# Patient Record
Sex: MALE | Race: WHITE | NOT HISPANIC OR LATINO | Employment: OTHER | URBAN - METROPOLITAN AREA
[De-identification: names, ages, dates, MRNs, and addresses within clinical notes are randomized per-mention and may not be internally consistent; named-entity substitution may affect disease eponyms.]

---

## 2017-07-19 ENCOUNTER — ALLSCRIPTS OFFICE VISIT (OUTPATIENT)
Dept: OTHER | Facility: OTHER | Age: 67
End: 2017-07-19

## 2017-07-19 LAB
BILIRUB UR QL STRIP: NORMAL
CLARITY UR: NORMAL
COLOR UR: YELLOW
GLUCOSE (HISTORICAL): 100
HGB UR QL STRIP.AUTO: NEGATIVE
KETONES UR STRIP-MCNC: NEGATIVE MG/DL
LEUKOCYTE ESTERASE UR QL STRIP: NORMAL
NITRITE UR QL STRIP: NEGATIVE
PH UR STRIP.AUTO: 6 [PH]
PROT UR STRIP-MCNC: 30 MG/DL
SP GR UR STRIP.AUTO: 1.02
UROBILINOGEN UR QL STRIP.AUTO: 0.2

## 2018-01-13 VITALS
DIASTOLIC BLOOD PRESSURE: 84 MMHG | BODY MASS INDEX: 24.91 KG/M2 | WEIGHT: 174 LBS | HEIGHT: 70 IN | SYSTOLIC BLOOD PRESSURE: 132 MMHG

## 2018-07-19 DIAGNOSIS — N40.1 ENLARGED PROSTATE WITH LOWER URINARY TRACT SYMPTOMS (LUTS): ICD-10-CM

## 2018-09-25 ENCOUNTER — OFFICE VISIT (OUTPATIENT)
Dept: FAMILY MEDICINE CLINIC | Facility: CLINIC | Age: 68
End: 2018-09-25
Payer: COMMERCIAL

## 2018-09-25 VITALS
DIASTOLIC BLOOD PRESSURE: 70 MMHG | TEMPERATURE: 97.6 F | OXYGEN SATURATION: 98 % | WEIGHT: 181 LBS | RESPIRATION RATE: 16 BRPM | SYSTOLIC BLOOD PRESSURE: 124 MMHG | HEIGHT: 70 IN | HEART RATE: 70 BPM | BODY MASS INDEX: 25.91 KG/M2

## 2018-09-25 DIAGNOSIS — Z00.00 WELL ADULT EXAM: Primary | ICD-10-CM

## 2018-09-25 DIAGNOSIS — R53.83 FATIGUE, UNSPECIFIED TYPE: ICD-10-CM

## 2018-09-25 DIAGNOSIS — R23.8 EASY BRUISING: ICD-10-CM

## 2018-09-25 LAB
ALBUMIN SERPL BCP-MCNC: 4.2 G/DL (ref 3.5–5)
ALP SERPL-CCNC: 56 U/L (ref 46–116)
ALT SERPL W P-5'-P-CCNC: 24 U/L (ref 12–78)
ANION GAP SERPL CALCULATED.3IONS-SCNC: 5 MMOL/L (ref 4–13)
AST SERPL W P-5'-P-CCNC: 18 U/L (ref 5–45)
BASOPHILS # BLD AUTO: 0.02 THOUSANDS/ΜL (ref 0–0.1)
BASOPHILS NFR BLD AUTO: 0 % (ref 0–1)
BILIRUB SERPL-MCNC: 0.69 MG/DL (ref 0.2–1)
BUN SERPL-MCNC: 15 MG/DL (ref 5–25)
CALCIUM SERPL-MCNC: 9.3 MG/DL (ref 8.3–10.1)
CHLORIDE SERPL-SCNC: 108 MMOL/L (ref 100–108)
CHOLEST SERPL-MCNC: 218 MG/DL (ref 50–200)
CO2 SERPL-SCNC: 29 MMOL/L (ref 21–32)
CREAT SERPL-MCNC: 0.81 MG/DL (ref 0.6–1.3)
EOSINOPHIL # BLD AUTO: 0.05 THOUSAND/ΜL (ref 0–0.61)
EOSINOPHIL NFR BLD AUTO: 1 % (ref 0–6)
ERYTHROCYTE [DISTWIDTH] IN BLOOD BY AUTOMATED COUNT: 13.4 % (ref 11.6–15.1)
GFR SERPL CREATININE-BSD FRML MDRD: 91 ML/MIN/1.73SQ M
GLUCOSE P FAST SERPL-MCNC: 87 MG/DL (ref 65–99)
HCT VFR BLD AUTO: 47.7 % (ref 36.5–49.3)
HDLC SERPL-MCNC: 50 MG/DL (ref 40–60)
HGB BLD-MCNC: 15.2 G/DL (ref 12–17)
IMM GRANULOCYTES # BLD AUTO: 0.01 THOUSAND/UL (ref 0–0.2)
IMM GRANULOCYTES NFR BLD AUTO: 0 % (ref 0–2)
LDLC SERPL CALC-MCNC: 150 MG/DL (ref 0–100)
LYMPHOCYTES # BLD AUTO: 1.85 THOUSANDS/ΜL (ref 0.6–4.47)
LYMPHOCYTES NFR BLD AUTO: 29 % (ref 14–44)
MCH RBC QN AUTO: 31.5 PG (ref 26.8–34.3)
MCHC RBC AUTO-ENTMCNC: 31.9 G/DL (ref 31.4–37.4)
MCV RBC AUTO: 99 FL (ref 82–98)
MONOCYTES # BLD AUTO: 0.79 THOUSAND/ΜL (ref 0.17–1.22)
MONOCYTES NFR BLD AUTO: 12 % (ref 4–12)
NEUTROPHILS # BLD AUTO: 3.67 THOUSANDS/ΜL (ref 1.85–7.62)
NEUTS SEG NFR BLD AUTO: 58 % (ref 43–75)
NONHDLC SERPL-MCNC: 168 MG/DL
NRBC BLD AUTO-RTO: 0 /100 WBCS
PLATELET # BLD AUTO: 218 THOUSANDS/UL (ref 149–390)
PMV BLD AUTO: 11 FL (ref 8.9–12.7)
POTASSIUM SERPL-SCNC: 5.4 MMOL/L (ref 3.5–5.3)
PROT SERPL-MCNC: 7.7 G/DL (ref 6.4–8.2)
RBC # BLD AUTO: 4.83 MILLION/UL (ref 3.88–5.62)
SODIUM SERPL-SCNC: 142 MMOL/L (ref 136–145)
T4 FREE SERPL-MCNC: 0.89 NG/DL (ref 0.76–1.46)
TRIGL SERPL-MCNC: 92 MG/DL
TSH SERPL DL<=0.05 MIU/L-ACNC: 0.35 UIU/ML (ref 0.36–3.74)
WBC # BLD AUTO: 6.39 THOUSAND/UL (ref 4.31–10.16)

## 2018-09-25 PROCEDURE — 80053 COMPREHEN METABOLIC PANEL: CPT | Performed by: FAMILY MEDICINE

## 2018-09-25 PROCEDURE — 80061 LIPID PANEL: CPT | Performed by: FAMILY MEDICINE

## 2018-09-25 PROCEDURE — 99397 PER PM REEVAL EST PAT 65+ YR: CPT | Performed by: FAMILY MEDICINE

## 2018-09-25 PROCEDURE — 85025 COMPLETE CBC W/AUTO DIFF WBC: CPT | Performed by: FAMILY MEDICINE

## 2018-09-25 PROCEDURE — 84439 ASSAY OF FREE THYROXINE: CPT | Performed by: FAMILY MEDICINE

## 2018-09-25 PROCEDURE — 84443 ASSAY THYROID STIM HORMONE: CPT | Performed by: FAMILY MEDICINE

## 2018-09-25 PROCEDURE — 36415 COLL VENOUS BLD VENIPUNCTURE: CPT | Performed by: FAMILY MEDICINE

## 2018-09-25 NOTE — PROGRESS NOTES
Assessment/Plan:    Discussed healthy diet, regular exercise, colonoscopy, recommended vaccines for age  Obtain blood work and we will schedule a follow-up appointment depending on results  Diagnoses and all orders for this visit:    Well adult exam  -     Cancel: Lipid panel; Future  -     Lipid panel; Future  -     Lipid panel    Easy bruising  -     Cancel: CBC and differential; Future  -     Cancel: TSH, 3rd generation with Free T4 reflex; Future  -     CBC and differential; Future  -     Comprehensive metabolic panel; Future  -     CBC and differential  -     Comprehensive metabolic panel    Fatigue, unspecified type  -     Cancel: Comprehensive metabolic panel; Future  -     Cancel: CBC and differential; Future  -     Cancel: TSH, 3rd generation with Free T4 reflex; Future  -     CBC and differential; Future  -     Comprehensive metabolic panel; Future  -     TSH, 3rd generation with Free T4 reflex; Future  -     CBC and differential  -     Comprehensive metabolic panel  -     TSH, 3rd generation with Free T4 reflex  -     T4, free; Future  -     T4, free          Subjective:      Patient ID: Silvestre Patiño is a 76 y o  male  This is a 77 yo male who presents for a wellness exam  The patient follows with Urology for BPH but has not otherwise seen a doctor in several years  He denies any recent illnesses  His only medications are Flomax 0 4 mg and Saw Palmetto  He does not take any OTC medications  No known drug allergies  Pt denies any H/A, dizziness, visual disturbances, chest pain, SOB, n/v, joint pain or muscle aches  The following portions of the patient's history were reviewed and updated as appropriate: allergies, current medications, past family history, past medical history, past social history, past surgical history and problem list     Review of Systems   Constitutional: Positive for fatigue  Negative for activity change and appetite change     Respiratory: Negative for shortness of breath  Cardiovascular: Negative for chest pain  Gastrointestinal: Negative for abdominal pain, nausea and vomiting  Musculoskeletal: Negative for arthralgias and myalgias  Neurological: Negative for dizziness and headaches  Hematological: Bruises/bleeds easily  Objective:      /70 (BP Location: Left arm, Patient Position: Sitting, Cuff Size: Standard)   Pulse 70   Temp 97 6 °F (36 4 °C) (Tympanic)   Resp 16   Ht 5' 10" (1 778 m)   Wt 82 1 kg (181 lb)   SpO2 98%   BMI 25 97 kg/m²          Physical Exam   Constitutional: He is oriented to person, place, and time  He appears well-developed and well-nourished  No distress  HENT:   Head: Normocephalic and atraumatic  Right Ear: External ear normal    Left Ear: External ear normal    Nose: Nose normal    Eyes: Pupils are equal, round, and reactive to light  Conjunctivae and EOM are normal  No scleral icterus  Neck: Normal range of motion  Neck supple  Carotid bruit is not present  No tracheal deviation and no edema present  No thyromegaly present  Cardiovascular: Normal rate, regular rhythm and normal heart sounds  No murmur heard  Pulmonary/Chest: Effort normal and breath sounds normal  No respiratory distress  He has no wheezes  He has no rales  Abdominal: Soft  There is no tenderness  Musculoskeletal: Normal range of motion  He exhibits no edema  Lymphadenopathy:     He has no cervical adenopathy  Neurological: He is alert and oriented to person, place, and time  He has normal reflexes  Skin: Skin is warm and dry  Psychiatric: He has a normal mood and affect   His behavior is normal  Judgment and thought content normal

## 2018-09-27 ENCOUNTER — TELEPHONE (OUTPATIENT)
Dept: FAMILY MEDICINE CLINIC | Facility: CLINIC | Age: 68
End: 2018-09-27

## 2018-09-28 NOTE — TELEPHONE ENCOUNTER
I called the patient and informed him of results and he is aware he will monitor diet and exercise and repeat labs in about 6 months    He has not decided if he is staying in our office or going to transfer he will let us know

## 2018-09-28 NOTE — TELEPHONE ENCOUNTER
Please call patient and inform that his blood work results showed:  1  Very borderline abnormality in his thyroid screening  His TSH was very slightly low, but his free T4 level was normal   Because his T4 level is normal, we do not need to treat or do anything specific regarding his slightly low TSH  My only suggestion would be to repeat these lab tests in 6 months  2  His cholesterol profile showed slightly high cholesterol of 218  This should be less than 200  His triglycerides and HDL are within normal limits  His LDL (bad cholesterol) was high at 150  For this I would recommend Mediterranean diet and regular a will back exercise like an exercise bike or elliptical 30 minutes 3 to 5 times a week  3  His blood sugar, kidney function, liver function and electrolytes were within satisfactory limits  Would recommend that he establish with a new primary care provider since I will be leaving at the end of October  He should schedule repeat thyroid and cholesterol blood work in 6 months and see his new PCP

## 2018-09-28 NOTE — PATIENT INSTRUCTIONS

## 2018-10-25 ENCOUNTER — OFFICE VISIT (OUTPATIENT)
Dept: FAMILY MEDICINE CLINIC | Facility: CLINIC | Age: 68
End: 2018-10-25
Payer: COMMERCIAL

## 2018-10-25 DIAGNOSIS — Z23 NEED FOR VACCINATION: Primary | ICD-10-CM

## 2018-10-25 PROCEDURE — 90670 PCV13 VACCINE IM: CPT

## 2018-10-25 PROCEDURE — 90471 IMMUNIZATION ADMIN: CPT

## 2019-09-03 ENCOUNTER — OFFICE VISIT (OUTPATIENT)
Dept: URGENT CARE | Facility: CLINIC | Age: 69
End: 2019-09-03
Payer: MEDICARE

## 2019-09-03 VITALS
TEMPERATURE: 98.1 F | OXYGEN SATURATION: 97 % | BODY MASS INDEX: 25.91 KG/M2 | HEIGHT: 70 IN | DIASTOLIC BLOOD PRESSURE: 78 MMHG | HEART RATE: 82 BPM | RESPIRATION RATE: 18 BRPM | WEIGHT: 181 LBS | SYSTOLIC BLOOD PRESSURE: 128 MMHG

## 2019-09-03 DIAGNOSIS — M79.661 PAIN IN RIGHT SHIN: Primary | ICD-10-CM

## 2019-09-03 PROCEDURE — G0463 HOSPITAL OUTPT CLINIC VISIT: HCPCS | Performed by: PHYSICIAN ASSISTANT

## 2019-09-03 PROCEDURE — 99213 OFFICE O/P EST LOW 20 MIN: CPT | Performed by: PHYSICIAN ASSISTANT

## 2019-09-03 RX ORDER — IBUPROFEN 800 MG/1
800 TABLET ORAL EVERY 6 HOURS PRN
Qty: 28 TABLET | Refills: 0 | Status: SHIPPED | OUTPATIENT
Start: 2019-09-03 | End: 2020-10-31 | Stop reason: ALTCHOICE

## 2019-09-03 RX ORDER — NITROFURANTOIN MACROCRYSTALS 100 MG/1
CAPSULE ORAL
COMMUNITY
End: 2020-10-31 | Stop reason: ALTCHOICE

## 2019-09-03 NOTE — PROGRESS NOTES
[facility]        NAME: Jacinda Martin is a 71 y o  male  : 1950    MRN: [id number]  DATE: September 3, 2019  TIME: 3:56 PM    Assessment and Plan   Pain in right shin [M79 661]  1  Pain in right shin  ibuprofen (MOTRIN) 800 mg tablet         Patient Instructions     Clinically, low suspicion for DVT, however I explained to pt that I cannot definitely rule this out and he should go to ED for imaging for this  Likely, pain is related to an ortho issue so recommend f/u with PCP and ortho in 3-5 days for further eval and management and he agrees  Chief Complaint     Chief Complaint   Patient presents with    Leg Pain     C/O persistent pain in right lower leg with bulging veins x 4 weeks  Pt states that the pain is worse in the morning upon rising  Pt denies any known injury  History of Present Illness       72 y/o M presents for eval of R anterior shin pain onset 4 weeks ago  Pt states the pain is worse after waking or when driving for long periods of time - anytime his leg is stiff for a period of time  He states the pain resolves once he walks on it  He denies any chest pain, shortness of breath, palpitations, calf pain or swelling  He takes aleve for the pain which does help  He has some varicose veins in the area so he tried using compression stockings for this, but it has not helped  He is a nonsmoker, no recent travel or surgeries  Review of Systems   Review of Systems   All other systems reviewed and are negative          Current Medications       Current Outpatient Medications:     ibuprofen (MOTRIN) 800 mg tablet, Take 1 tablet (800 mg total) by mouth every 6 (six) hours as needed for mild pain, Disp: 28 tablet, Rfl: 0    nitrofurantoin (MACRODANTIN) 100 mg capsule, nitrofurantoin monohydrate/macrocrystals 100 mg capsule, Disp: , Rfl:     Current Allergies     Allergies as of 2019    (No Known Allergies)            The following portions of the patient's history were reviewed and updated as appropriate: allergies, current medications, past family history, past medical history, past social history, past surgical history and problem list      Past Medical History:   Diagnosis Date    BPH with obstruction/lower urinary tract symptoms     Cystitis, chronic     Epididymo-orchitis     Feeling of incomplete bladder emptying     Frequency of micturition     Nocturia     Other microscopic hematuria     Poor urinary stream     Simple renal cyst        Past Surgical History:   Procedure Laterality Date    CYSTOSCOPY  2012       Family History   Problem Relation Age of Onset    Diabetes Mother     Hypertension Mother          Medications have been verified  Objective   /78   Pulse 82   Temp 98 1 °F (36 7 °C) (Tympanic)   Resp 18   Ht 5' 10" (1 778 m)   Wt 82 1 kg (181 lb)   SpO2 97%   BMI 25 97 kg/m²        Physical Exam     Physical Exam   Constitutional: He appears well-developed and well-nourished  No distress  HENT:   Head: Normocephalic and atraumatic  Cardiovascular: Normal rate, regular rhythm and intact distal pulses  Exam reveals no gallop and no friction rub  No murmur heard  Pulmonary/Chest: Effort normal and breath sounds normal  No respiratory distress  He has no wheezes  He has no rales  Musculoskeletal:        Right lower leg: Normal  He exhibits no tenderness, no bony tenderness, no swelling and no edema  R leg noris's negative  Mild varicosities   Psychiatric: He has a normal mood and affect

## 2019-09-05 ENCOUNTER — OFFICE VISIT (OUTPATIENT)
Dept: FAMILY MEDICINE CLINIC | Facility: CLINIC | Age: 69
End: 2019-09-05
Payer: MEDICARE

## 2019-09-05 ENCOUNTER — APPOINTMENT (OUTPATIENT)
Dept: RADIOLOGY | Facility: HOSPITAL | Age: 69
End: 2019-09-05
Payer: MEDICARE

## 2019-09-05 VITALS
BODY MASS INDEX: 24.2 KG/M2 | SYSTOLIC BLOOD PRESSURE: 120 MMHG | HEART RATE: 82 BPM | RESPIRATION RATE: 18 BRPM | TEMPERATURE: 98 F | DIASTOLIC BLOOD PRESSURE: 76 MMHG | HEIGHT: 70 IN | OXYGEN SATURATION: 98 % | WEIGHT: 169 LBS

## 2019-09-05 DIAGNOSIS — E78.00 HYPERCHOLESTEROLEMIA: Primary | ICD-10-CM

## 2019-09-05 DIAGNOSIS — Z13.31 NEGATIVE DEPRESSION SCREENING: ICD-10-CM

## 2019-09-05 DIAGNOSIS — M79.604 LEG PAIN, DIFFUSE, RIGHT: ICD-10-CM

## 2019-09-05 DIAGNOSIS — Z12.5 SCREENING FOR PROSTATE CANCER: ICD-10-CM

## 2019-09-05 DIAGNOSIS — Z13.6 SCREENING FOR CARDIOVASCULAR CONDITION: ICD-10-CM

## 2019-09-05 PROCEDURE — 73590 X-RAY EXAM OF LOWER LEG: CPT

## 2019-09-05 PROCEDURE — 99203 OFFICE O/P NEW LOW 30 MIN: CPT | Performed by: FAMILY MEDICINE

## 2019-09-05 NOTE — PROGRESS NOTES
Assessment/Plan:    No problem-specific Assessment & Plan notes found for this encounter  Diagnoses and all orders for this visit:    Hypercholesterolemia  -     Comprehensive metabolic panel; Future  -     Lipid panel; Future    Screening for cardiovascular condition  -     CBC and differential; Future  -     Comprehensive metabolic panel; Future  -     Lipid panel; Future  -     TSH, 3rd generation with Free T4 reflex; Future    Screening for prostate cancer  -     PSA, Total Screen; Future    Negative depression screening    Leg pain, diffuse, right          PHQ-9 Depression Screening    PHQ-9:    Frequency of the following problems over the past two weeks:       Little interest or pleasure in doing things:  0 - not at all  Feeling down, depressed, or hopeless:  0 - not at all  PHQ-2 Score:  0            Subjective:      Patient ID: Jimmie Rene is a 71 y o  male  New pt here to establish, hypercholesterolemia, pt complains of right leg anterior pain, it is black and blue in the morning, it started 1 month ago and it is getting worse, pt tried a compression sock which norton not help      The following portions of the patient's history were reviewed and updated as appropriate: allergies, current medications, past family history, past medical history, past social history, past surgical history and problem list     Review of Systems   Constitutional: Positive for fatigue  Negative for chills and fever  HENT: Negative  Eyes: Negative  Respiratory: Negative for shortness of breath and wheezing  Cardiovascular: Negative for chest pain and palpitations  Gastrointestinal: Negative for abdominal pain, blood in stool, constipation, diarrhea, nausea and vomiting  Endocrine: Negative  Genitourinary: Negative for difficulty urinating and dysuria  Musculoskeletal: Positive for myalgias  Negative for arthralgias  Skin: Negative  Allergic/Immunologic: Negative      Neurological: Negative for seizures and syncope  Hematological: Negative for adenopathy  Psychiatric/Behavioral: Negative  Objective:    /76   Pulse 82   Temp 98 °F (36 7 °C)   Resp 18   Ht 5' 10" (1 778 m)   Wt 76 7 kg (169 lb)   SpO2 98%   BMI 24 25 kg/m²      Physical Exam   Constitutional: He is oriented to person, place, and time  He appears well-developed and well-nourished  No distress  HENT:   Head: Normocephalic and atraumatic  Right Ear: External ear normal    Left Ear: External ear normal    Nose: Nose normal    Mouth/Throat: Oropharynx is clear and moist    Eyes: Pupils are equal, round, and reactive to light  Conjunctivae and EOM are normal  No scleral icterus  Neck: Normal range of motion  Neck supple  Cardiovascular: Normal rate, regular rhythm and normal heart sounds  Exam reveals no gallop and no friction rub  No murmur heard  Pulmonary/Chest: Effort normal and breath sounds normal  No respiratory distress  He has no wheezes  He has no rales  Abdominal: Soft  Bowel sounds are normal  He exhibits no distension and no mass  There is no tenderness  There is no rebound and no guarding  Musculoskeletal: Normal range of motion  He exhibits no edema  Lymphadenopathy:     He has no cervical adenopathy  Neurological: He is alert and oriented to person, place, and time  He has normal reflexes  Skin: Skin is warm and dry  He is not diaphoretic  Psychiatric: He has a normal mood and affect   His behavior is normal  Judgment and thought content normal

## 2019-09-09 ENCOUNTER — APPOINTMENT (OUTPATIENT)
Dept: LAB | Facility: HOSPITAL | Age: 69
End: 2019-09-09
Payer: MEDICARE

## 2019-09-09 DIAGNOSIS — E78.00 HYPERCHOLESTEROLEMIA: ICD-10-CM

## 2019-09-09 DIAGNOSIS — Z12.5 SCREENING FOR PROSTATE CANCER: ICD-10-CM

## 2019-09-09 DIAGNOSIS — Z13.6 SCREENING FOR CARDIOVASCULAR CONDITION: ICD-10-CM

## 2019-09-09 LAB
ALBUMIN SERPL BCP-MCNC: 3.6 G/DL (ref 3.5–5)
ALP SERPL-CCNC: 57 U/L (ref 46–116)
ALT SERPL W P-5'-P-CCNC: 28 U/L (ref 12–78)
ANION GAP SERPL CALCULATED.3IONS-SCNC: 2 MMOL/L (ref 4–13)
AST SERPL W P-5'-P-CCNC: 20 U/L (ref 5–45)
BASOPHILS # BLD AUTO: 0.03 THOUSANDS/ΜL (ref 0–0.1)
BASOPHILS NFR BLD AUTO: 0 % (ref 0–1)
BILIRUB SERPL-MCNC: 0.61 MG/DL (ref 0.2–1)
BUN SERPL-MCNC: 20 MG/DL (ref 5–25)
CALCIUM SERPL-MCNC: 8.9 MG/DL (ref 8.3–10.1)
CHLORIDE SERPL-SCNC: 109 MMOL/L (ref 100–108)
CHOLEST SERPL-MCNC: 159 MG/DL (ref 50–200)
CO2 SERPL-SCNC: 28 MMOL/L (ref 21–32)
CREAT SERPL-MCNC: 0.94 MG/DL (ref 0.6–1.3)
EOSINOPHIL # BLD AUTO: 0.05 THOUSAND/ΜL (ref 0–0.61)
EOSINOPHIL NFR BLD AUTO: 1 % (ref 0–6)
ERYTHROCYTE [DISTWIDTH] IN BLOOD BY AUTOMATED COUNT: 14 % (ref 11.6–15.1)
GFR SERPL CREATININE-BSD FRML MDRD: 82 ML/MIN/1.73SQ M
GLUCOSE P FAST SERPL-MCNC: 92 MG/DL (ref 65–99)
HCT VFR BLD AUTO: 43.9 % (ref 36.5–49.3)
HDLC SERPL-MCNC: 48 MG/DL (ref 40–60)
HGB BLD-MCNC: 13.9 G/DL (ref 12–17)
IMM GRANULOCYTES # BLD AUTO: 0.03 THOUSAND/UL (ref 0–0.2)
IMM GRANULOCYTES NFR BLD AUTO: 0 % (ref 0–2)
LDLC SERPL CALC-MCNC: 102 MG/DL (ref 0–100)
LYMPHOCYTES # BLD AUTO: 1.74 THOUSANDS/ΜL (ref 0.6–4.47)
LYMPHOCYTES NFR BLD AUTO: 18 % (ref 14–44)
MCH RBC QN AUTO: 31.6 PG (ref 26.8–34.3)
MCHC RBC AUTO-ENTMCNC: 31.7 G/DL (ref 31.4–37.4)
MCV RBC AUTO: 100 FL (ref 82–98)
MONOCYTES # BLD AUTO: 1.11 THOUSAND/ΜL (ref 0.17–1.22)
MONOCYTES NFR BLD AUTO: 11 % (ref 4–12)
NEUTROPHILS # BLD AUTO: 6.99 THOUSANDS/ΜL (ref 1.85–7.62)
NEUTS SEG NFR BLD AUTO: 70 % (ref 43–75)
NONHDLC SERPL-MCNC: 111 MG/DL
NRBC BLD AUTO-RTO: 0 /100 WBCS
PLATELET # BLD AUTO: 208 THOUSANDS/UL (ref 149–390)
PMV BLD AUTO: 10.5 FL (ref 8.9–12.7)
POTASSIUM SERPL-SCNC: 4.7 MMOL/L (ref 3.5–5.3)
PROT SERPL-MCNC: 7.4 G/DL (ref 6.4–8.2)
PSA SERPL-MCNC: 2.6 NG/ML (ref 0–4)
RBC # BLD AUTO: 4.4 MILLION/UL (ref 3.88–5.62)
SODIUM SERPL-SCNC: 139 MMOL/L (ref 136–145)
TRIGL SERPL-MCNC: 44 MG/DL
TSH SERPL DL<=0.05 MIU/L-ACNC: 0.41 UIU/ML (ref 0.36–3.74)
WBC # BLD AUTO: 9.95 THOUSAND/UL (ref 4.31–10.16)

## 2019-09-09 PROCEDURE — 84443 ASSAY THYROID STIM HORMONE: CPT

## 2019-09-09 PROCEDURE — 80053 COMPREHEN METABOLIC PANEL: CPT

## 2019-09-09 PROCEDURE — G0103 PSA SCREENING: HCPCS

## 2019-09-09 PROCEDURE — 85025 COMPLETE CBC W/AUTO DIFF WBC: CPT

## 2019-09-09 PROCEDURE — 36415 COLL VENOUS BLD VENIPUNCTURE: CPT

## 2019-09-09 PROCEDURE — 80061 LIPID PANEL: CPT

## 2019-10-03 ENCOUNTER — OFFICE VISIT (OUTPATIENT)
Dept: FAMILY MEDICINE CLINIC | Facility: CLINIC | Age: 69
End: 2019-10-03
Payer: MEDICARE

## 2019-10-03 VITALS
DIASTOLIC BLOOD PRESSURE: 72 MMHG | OXYGEN SATURATION: 99 % | BODY MASS INDEX: 23.91 KG/M2 | RESPIRATION RATE: 18 BRPM | HEART RATE: 74 BPM | WEIGHT: 167 LBS | HEIGHT: 70 IN | SYSTOLIC BLOOD PRESSURE: 124 MMHG | TEMPERATURE: 98.4 F

## 2019-10-03 DIAGNOSIS — Z11.59 NEED FOR HEPATITIS C SCREENING TEST: ICD-10-CM

## 2019-10-03 DIAGNOSIS — Z12.11 SCREENING FOR COLON CANCER: ICD-10-CM

## 2019-10-03 DIAGNOSIS — M25.561 CHRONIC PAIN OF RIGHT KNEE: ICD-10-CM

## 2019-10-03 DIAGNOSIS — Z13.31 NEGATIVE DEPRESSION SCREENING: ICD-10-CM

## 2019-10-03 DIAGNOSIS — E78.00 HYPERCHOLESTEROLEMIA: ICD-10-CM

## 2019-10-03 DIAGNOSIS — Z00.00 MEDICARE ANNUAL WELLNESS VISIT, SUBSEQUENT: Primary | ICD-10-CM

## 2019-10-03 DIAGNOSIS — G89.29 CHRONIC PAIN OF RIGHT KNEE: ICD-10-CM

## 2019-10-03 PROCEDURE — 99214 OFFICE O/P EST MOD 30 MIN: CPT | Performed by: FAMILY MEDICINE

## 2019-10-03 PROCEDURE — G0402 INITIAL PREVENTIVE EXAM: HCPCS | Performed by: FAMILY MEDICINE

## 2019-10-03 NOTE — PROGRESS NOTES
Assessment/Plan:    No problem-specific Assessment & Plan notes found for this encounter  Diagnoses and all orders for this visit:    Medicare annual wellness visit, subsequent    Need for hepatitis C screening test  -     Hepatitis C antibody; Future    Screening for colon cancer  -     Ambulatory referral to Gastroenterology; Future  -     Occult Blood, Fecal Immunochemical; Future    Negative depression screening    Hypercholesterolemia  Comments:  pt counseled on diet and exercise    Chronic pain of right knee  Comments:  await MRI          PHQ-9 Depression Screening    PHQ-9:    Frequency of the following problems over the past two weeks:       Little interest or pleasure in doing things:  0 - not at all  Feeling down, depressed, or hopeless:  0 - not at all  PHQ-2 Score:  0            Subjective:      Patient ID: Gagan Coulter is a 71 y o  male  HPI    The following portions of the patient's history were reviewed and updated as appropriate: allergies, current medications, past family history, past medical history, past social history, past surgical history and problem list     Review of Systems   Constitutional: Negative  Negative for chills, fatigue and fever  HENT: Negative  Eyes: Negative  Respiratory: Negative for shortness of breath and wheezing  Cardiovascular: Negative for chest pain and palpitations  Gastrointestinal: Negative for abdominal pain, blood in stool, constipation, diarrhea, nausea and vomiting  Endocrine: Negative  Genitourinary: Negative for difficulty urinating and dysuria  Musculoskeletal: Negative for arthralgias and myalgias  Skin: Negative  Allergic/Immunologic: Negative  Neurological: Negative for seizures and syncope  Hematological: Negative for adenopathy  Psychiatric/Behavioral: Negative            Objective:    /72   Pulse 74   Temp 98 4 °F (36 9 °C) (Tympanic)   Resp 18   Ht 5' 10" (1 778 m)   Wt 75 8 kg (167 lb)   SpO2 99% BMI 23 96 kg/m²      Physical Exam   Constitutional: He is oriented to person, place, and time  He appears well-developed and well-nourished  No distress  HENT:   Head: Normocephalic and atraumatic  Right Ear: External ear normal    Left Ear: External ear normal    Nose: Nose normal    Mouth/Throat: Oropharynx is clear and moist    Eyes: Pupils are equal, round, and reactive to light  Conjunctivae and EOM are normal  No scleral icterus  Neck: Normal range of motion  Neck supple  Cardiovascular: Normal rate, regular rhythm and normal heart sounds  Exam reveals no gallop and no friction rub  No murmur heard  Pulmonary/Chest: Effort normal and breath sounds normal  No respiratory distress  He has no wheezes  He has no rales  Abdominal: Soft  Bowel sounds are normal  He exhibits no distension and no mass  There is no tenderness  There is no rebound and no guarding  Musculoskeletal: Normal range of motion  He exhibits no edema  Lymphadenopathy:     He has no cervical adenopathy  Neurological: He is alert and oriented to person, place, and time  He has normal reflexes  Skin: Skin is warm and dry  He is not diaphoretic  Psychiatric: He has a normal mood and affect   His behavior is normal  Judgment and thought content normal

## 2019-10-03 NOTE — PATIENT INSTRUCTIONS
Medicare Preventive Visit Patient Instructions  Thank you for completing your Welcome to Medicare Visit or Medicare Annual Wellness Visit today  Your next wellness visit will be due in one year (10/3/2020)  The screening/preventive services that you may require over the next 5-10 years are detailed below  Some tests may not apply to you based off risk factors and/or age  Screening tests ordered at today's visit but not completed yet may show as past due  Also, please note that scanned in results may not display below  Preventive Screenings:  Service Recommendations Previous Testing/Comments   Colorectal Cancer Screening  · Colonoscopy    · Fecal Occult Blood Test (FOBT)/Fecal Immunochemical Test (FIT)  · Fecal DNA/Cologuard Test  · Flexible Sigmoidoscopy Age: 54-65 years old   Colonoscopy: every 10 years (May be performed more frequently if at higher risk)  OR  FOBT/FIT: every 1 year  OR  Cologuard: every 3 years  OR  Sigmoidoscopy: every 5 years  Screening may be recommended earlier than age 48 if at higher risk for colorectal cancer  Also, an individualized decision between you and your healthcare provider will decide whether screening between the ages of 74-80 would be appropriate  Colonoscopy: Not on file  FOBT/FIT: Not on file  Cologuard: Not on file  Sigmoidoscopy: Not on file         Prostate Cancer Screening Individualized decision between patient and health care provider in men between ages of 53-78   Medicare will cover every 12 months beginning on the day after your 50th birthday PSA: 2 6 ng/mL          Hepatitis C Screening Once for adults born between 1945 and 1965  More frequently in patients at high risk for Hepatitis C Hep C Antibody: Not on file       Diabetes Screening 1-2 times per year if you're at risk for diabetes or have pre-diabetes Fasting glucose: 92 mg/dL   A1C: No results in last 5 years       Cholesterol Screening Once every 5 years if you don't have a lipid disorder   May order more often based on risk factors  Lipid panel: 09/09/2019          Other Preventive Screenings Covered by Medicare:  1  Abdominal Aortic Aneurysm (AAA) Screening: covered once if your at risk  You're considered to be at risk if you have a family history of AAA or a male between the age of 73-68 who smoking at least 100 cigarettes in your lifetime  2  Lung Cancer Screening: covers low dose CT scan once per year if you meet all of the following conditions: (1) Age 50-69; (2) No signs or symptoms of lung cancer; (3) Current smoker or have quit smoking within the last 15 years; (4) You have a tobacco smoking history of at least 30 pack years (packs per day x number of years you smoked); (5) You get a written order from a healthcare provider  3  Glaucoma Screening: covered annually if you're considered high risk: (1) You have diabetes OR (2) Family history of glaucoma OR (3)  aged 48 and older OR (3)  American aged 72 and older  3  Osteoporosis Screening: covered every 2 years if you meet one of the following conditions: (1) Have a vertebral abnormality; (2) On glucocorticoid therapy for more than 3 months; (3) Have primary hyperparathyroidism; (4) On osteoporosis medications and need to assess response to drug therapy  5  HIV Screening: covered annually if you're between the age of 12-76  Also covered annually if you are younger than 13 and older than 72 with risk factors for HIV infection  For pregnant patients, it is covered up to 3 times per pregnancy      Immunizations:  Immunization Recommendations   Influenza Vaccine Annual influenza vaccination during flu season is recommended for all persons aged >= 6 months who do not have contraindications   Pneumococcal Vaccine (Prevnar and Pneumovax)  * Prevnar = PCV13  * Pneumovax = PPSV23 Adults 25-60 years old: 1-3 doses may be recommended based on certain risk factors  Adults 72 years old: Prevnar (PCV13) vaccine recommended followed by Pneumovax (PPSV23) vaccine  If already received PPSV23 since turning 65, then PCV13 recommended at least one year after PPSV23 dose  Hepatitis B Vaccine 3 dose series if at intermediate or high risk (ex: diabetes, end stage renal disease, liver disease)   Tetanus (Td) Vaccine - COST NOT COVERED BY MEDICARE PART B Following completion of primary series, a booster dose should be given every 10 years to maintain immunity against tetanus  Td may also be given as tetanus wound prophylaxis  Tdap Vaccine - COST NOT COVERED BY MEDICARE PART B Recommended at least once for all adults  For pregnant patients, recommended with each pregnancy  Shingles Vaccine (Shingrix) - COST NOT COVERED BY MEDICARE PART B  2 shot series recommended in those aged 48 and above     Health Maintenance Due:      Topic Date Due    Hepatitis C Screening  1950    CRC Screening: Colonoscopy  1950     Immunizations Due:      Topic Date Due    INFLUENZA VACCINE  07/01/2019     Advance Directives   What are advance directives? Advance directives are legal documents that state your wishes and plans for medical care  These plans are made ahead of time in case you lose your ability to make decisions for yourself  Advance directives can apply to any medical decision, such as the treatments you want, and if you want to donate organs  What are the types of advance directives? There are many types of advance directives, and each state has rules about how to use them  You may choose a combination of any of the following:  · Living will: This is a written record of the treatment you want  You can also choose which treatments you do not want, which to limit, and which to stop at a certain time  This includes surgery, medicine, IV fluid, and tube feedings  · Durable power of  for healthcare [facility]): This is a written record that states who you want to make healthcare choices for you when you are unable to make them for yourself   This person, called a proxy, is usually a family member or a friend  You may choose more than 1 proxy  · Do not resuscitate (DNR) order:  A DNR order is used in case your heart stops beating or you stop breathing  It is a request not to have certain forms of treatment, such as CPR  A DNR order may be included in other types of advance directives  · Medical directive: This covers the care that you want if you are in a coma, near death, or unable to make decisions for yourself  You can list the treatments you want for each condition  Treatment may include pain medicine, surgery, blood transfusions, dialysis, IV or tube feedings, and a ventilator (breathing machine)  · Values history: This document has questions about your views, beliefs, and how you feel and think about life  This information can help others choose the care that you would choose  Why are advance directives important? An advance directive helps you control your care  Although spoken wishes may be used, it is better to have your wishes written down  Spoken wishes can be misunderstood, or not followed  Treatments may be given even if you do not want them  An advance directive may make it easier for your family to make difficult choices about your care  Cigarette Smoking and Your Health   Risks to your health if you smoke:  Nicotine and other chemicals found in tobacco damage every cell in your body  Even if you are a light smoker, you have an increased risk for cancer, heart disease, and lung disease  If you are pregnant or have diabetes, smoking increases your risk for complications  Benefits to your health if you stop smoking:   · You decrease respiratory symptoms such as coughing, wheezing, and shortness of breath  · You reduce your risk for cancers of the lung, mouth, throat, kidney, bladder, pancreas, stomach, and cervix  If you already have cancer, you increase the benefits of chemotherapy   You also reduce your risk for cancer returning or a second cancer from developing  · You reduce your risk for heart disease, blood clots, heart attack, and stroke  · You reduce your risk for lung infections, and diseases such as pneumonia, asthma, chronic bronchitis, and emphysema  · Your circulation improves  More oxygen can be delivered to your body  If you have diabetes, you lower your risk for complications, such as kidney, artery, and eye diseases  You also lower your risk for nerve damage  Nerve damage can lead to amputations, poor vision, and blindness  · You improve your body's ability to heal and to fight infections  For more information and support to stop smoking:   · Smokefree  gov  Phone: 5- 882 - 110-0105  Web Address: www [employer]  [address] [place] [address] 2018 Information is for End User's use only and may not be sold, redistributed or otherwise used for commercial purposes   All illustrations and images included in CareNotes® are the copyrighted property of [employer]  or [address] [place] [address]

## 2019-10-03 NOTE — PROGRESS NOTES
Assessment and Plan:     Problem List Items Addressed This Visit     None      Visit Diagnoses     Medicare annual wellness visit, subsequent    -  Primary    Need for hepatitis C screening test        Screening for colon cancer               Preventive health issues were discussed with patient, and age appropriate screening tests were ordered as noted in patient's After Visit Summary  Personalized health advice and appropriate referrals for health education or preventive services given if needed, as noted in patient's After Visit Summary       History of Present Illness:     Patient presents for Medicare Annual Wellness visit    Patient Care Team:  Yayo Willard DO as PCP - General (Family Medicine)  MD Theodora Galloway Caro, CRNP     Problem List:     Patient Active Problem List   Diagnosis    Negative depression screening      Past Medical and Surgical History:     Past Medical History:   Diagnosis Date    BPH with obstruction/lower urinary tract symptoms     Cystitis, chronic     Epididymo-orchitis     Feeling of incomplete bladder emptying     Frequency of micturition     Nocturia     Other microscopic hematuria     Poor urinary stream     Simple renal cyst      Past Surgical History:   Procedure Laterality Date    CYSTOSCOPY  2012      Family History:     Family History   Problem Relation Age of Onset    Diabetes Mother     Hypertension Mother       Social History:     Social History     Socioeconomic History    Marital status: /Civil Union     Spouse name: None    Number of children: None    Years of education: None    Highest education level: None   Occupational History    None   Social Needs    Financial resource strain: None    Food insecurity:     Worry: None     Inability: None    Transportation needs:     Medical: None     Non-medical: None   Tobacco Use    Smoking status: Current Some Day Smoker     Packs/day: 0 50     Years: 1 25     Pack years: 0 62     Types: Cigarettes    Smokeless tobacco: Never Used   Substance and Sexual Activity    Alcohol use: No    Drug use: None    Sexual activity: None   Lifestyle    Physical activity:     Days per week: None     Minutes per session: None    Stress: None   Relationships    Social connections:     Talks on phone: None     Gets together: None     Attends Hindu service: None     Active member of club or organization: None     Attends meetings of clubs or organizations: None     Relationship status: None    Intimate partner violence:     Fear of current or ex partner: None     Emotionally abused: None     Physically abused: None     Forced sexual activity: None   Other Topics Concern    None   Social History Narrative    None       Medications and Allergies:     Current Outpatient Medications   Medication Sig Dispense Refill    ibuprofen (MOTRIN) 800 mg tablet Take 1 tablet (800 mg total) by mouth every 6 (six) hours as needed for mild pain 28 tablet 0    nitrofurantoin (MACRODANTIN) 100 mg capsule nitrofurantoin monohydrate/macrocrystals 100 mg capsule       No current facility-administered medications for this visit  No Known Allergies   Immunizations:     Immunization History   Administered Date(s) Administered    Influenza TIV (IM) 10/10/2014    Pneumococcal Conjugate 13-Valent 10/25/2018    Tdap 06/18/2014      Health Maintenance:         Topic Date Due    Hepatitis C Screening  1950    CRC Screening: Colonoscopy  1950         Topic Date Due    INFLUENZA VACCINE  07/01/2019      Medicare Health Risk Assessment:     /72   Pulse 74   Temp 98 4 °F (36 9 °C) (Tympanic)   Resp 18   Ht 5' 10" (1 778 m)   Wt 75 8 kg (167 lb)   SpO2 99%   BMI 23 96 kg/m²      Syed Cowan is here for his Subsequent Wellness visit  Health Risk Assessment:   Patient rates overall health as very good  Patient feels that their physical health rating is same  Eyesight was rated as same   Hearing was rated as slightly worse  Patient feels that their emotional and mental health rating is same  Pain experienced in the last 7 days has been a lot  Patient's pain rating has been 10/10  Patient states that he has experienced no weight loss or gain in last 6 months  Depression Screening:   PHQ-2 Score: 0      Fall Risk Screening: In the past year, patient has experienced: no history of falling in past year      Home Safety:  Patient has trouble with stairs inside or outside of their home  Patient has working smoke alarms and has no working carbon monoxide detector  Home safety hazards include: none  Nutrition:   Current diet is Regular  Medications:   Patient is currently taking over-the-counter supplements  OTC medications include: see medication list  Patient is able to manage medications  Activities of Daily Living (ADLs)/Instrumental Activities of Daily Living (IADLs):   Walk and transfer into and out of bed and chair?: Yes  Dress and groom yourself?: Yes    Bathe or shower yourself?: Yes    Feed yourself?  Yes  Do your laundry/housekeeping?: Yes  Manage your money, pay your bills and track your expenses?: Yes  Make your own meals?: Yes    Do your own shopping?: Yes    Advance Care Planning:   Living will: No    Durable POA for healthcare: No    Advanced directive: No    Advanced directive counseling given: Yes    Five wishes given: Yes    End of Life Decisions reviewed with patient: No    Provider agrees with end of life decisions: No      Cognitive Screening:   Provider or family/friend/caregiver concerned regarding cognition?: No    PREVENTIVE SCREENINGS      Cardiovascular Screening:    General: Screening Not Indicated and History Lipid Disorder      Diabetes Screening:     General: Screening Current      Colorectal Cancer Screening:       Due for: Cologuard      Prostate Cancer Screening:    General: Screening Current      Osteoporosis Screening:    General: Screening Not Indicated      Abdominal Aortic Aneurysm (AAA) Screening:    Risk factors include: age between 73-67 yo and tobacco use        Lung Cancer Screening:     General: Screening Not Indicated      Hepatitis C Screening:    General: Screening Not Indicated      Joshua Ewing DO

## 2020-04-02 ENCOUNTER — TELEMEDICINE (OUTPATIENT)
Dept: FAMILY MEDICINE CLINIC | Facility: CLINIC | Age: 70
End: 2020-04-02
Payer: MEDICARE

## 2020-04-02 DIAGNOSIS — E78.00 HYPERCHOLESTEROLEMIA: Primary | ICD-10-CM

## 2020-04-02 DIAGNOSIS — Z12.5 SCREENING FOR PROSTATE CANCER: ICD-10-CM

## 2020-04-02 DIAGNOSIS — Z13.6 SCREENING FOR CARDIOVASCULAR CONDITION: ICD-10-CM

## 2020-04-02 PROCEDURE — 99213 OFFICE O/P EST LOW 20 MIN: CPT | Performed by: FAMILY MEDICINE

## 2020-10-10 ENCOUNTER — APPOINTMENT (OUTPATIENT)
Dept: LAB | Facility: CLINIC | Age: 70
End: 2020-10-10
Payer: MEDICARE

## 2020-10-10 DIAGNOSIS — Z12.5 SCREENING FOR PROSTATE CANCER: ICD-10-CM

## 2020-10-10 DIAGNOSIS — E78.00 HYPERCHOLESTEROLEMIA: ICD-10-CM

## 2020-10-10 DIAGNOSIS — Z13.6 SCREENING FOR CARDIOVASCULAR CONDITION: ICD-10-CM

## 2020-10-10 LAB
ALBUMIN SERPL BCP-MCNC: 3.8 G/DL (ref 3.5–5)
ALP SERPL-CCNC: 61 U/L (ref 46–116)
ALT SERPL W P-5'-P-CCNC: 28 U/L (ref 12–78)
ANION GAP SERPL CALCULATED.3IONS-SCNC: 3 MMOL/L (ref 4–13)
AST SERPL W P-5'-P-CCNC: 20 U/L (ref 5–45)
BASOPHILS # BLD AUTO: 0.02 THOUSANDS/ΜL (ref 0–0.1)
BASOPHILS NFR BLD AUTO: 0 % (ref 0–1)
BILIRUB SERPL-MCNC: 0.45 MG/DL (ref 0.2–1)
BUN SERPL-MCNC: 21 MG/DL (ref 5–25)
CALCIUM SERPL-MCNC: 9.3 MG/DL (ref 8.3–10.1)
CHLORIDE SERPL-SCNC: 112 MMOL/L (ref 100–108)
CHOLEST SERPL-MCNC: 172 MG/DL (ref 50–200)
CO2 SERPL-SCNC: 29 MMOL/L (ref 21–32)
CREAT SERPL-MCNC: 0.84 MG/DL (ref 0.6–1.3)
EOSINOPHIL # BLD AUTO: 0.12 THOUSAND/ΜL (ref 0–0.61)
EOSINOPHIL NFR BLD AUTO: 2 % (ref 0–6)
ERYTHROCYTE [DISTWIDTH] IN BLOOD BY AUTOMATED COUNT: 13.2 % (ref 11.6–15.1)
GFR SERPL CREATININE-BSD FRML MDRD: 89 ML/MIN/1.73SQ M
GLUCOSE P FAST SERPL-MCNC: 99 MG/DL (ref 65–99)
HCT VFR BLD AUTO: 43.4 % (ref 36.5–49.3)
HDLC SERPL-MCNC: 49 MG/DL
HGB BLD-MCNC: 13.8 G/DL (ref 12–17)
IMM GRANULOCYTES # BLD AUTO: 0.03 THOUSAND/UL (ref 0–0.2)
IMM GRANULOCYTES NFR BLD AUTO: 1 % (ref 0–2)
LDLC SERPL CALC-MCNC: 113 MG/DL (ref 0–100)
LYMPHOCYTES # BLD AUTO: 1.73 THOUSANDS/ΜL (ref 0.6–4.47)
LYMPHOCYTES NFR BLD AUTO: 29 % (ref 14–44)
MCH RBC QN AUTO: 31.3 PG (ref 26.8–34.3)
MCHC RBC AUTO-ENTMCNC: 31.8 G/DL (ref 31.4–37.4)
MCV RBC AUTO: 98 FL (ref 82–98)
MONOCYTES # BLD AUTO: 0.66 THOUSAND/ΜL (ref 0.17–1.22)
MONOCYTES NFR BLD AUTO: 11 % (ref 4–12)
NEUTROPHILS # BLD AUTO: 3.34 THOUSANDS/ΜL (ref 1.85–7.62)
NEUTS SEG NFR BLD AUTO: 57 % (ref 43–75)
NONHDLC SERPL-MCNC: 123 MG/DL
NRBC BLD AUTO-RTO: 0 /100 WBCS
PLATELET # BLD AUTO: 217 THOUSANDS/UL (ref 149–390)
PMV BLD AUTO: 10.4 FL (ref 8.9–12.7)
POTASSIUM SERPL-SCNC: 4.5 MMOL/L (ref 3.5–5.3)
PROT SERPL-MCNC: 7.3 G/DL (ref 6.4–8.2)
PSA SERPL-MCNC: 3.3 NG/ML (ref 0–4)
RBC # BLD AUTO: 4.41 MILLION/UL (ref 3.88–5.62)
SODIUM SERPL-SCNC: 144 MMOL/L (ref 136–145)
TRIGL SERPL-MCNC: 50 MG/DL
TSH SERPL DL<=0.05 MIU/L-ACNC: 0.59 UIU/ML (ref 0.36–3.74)
WBC # BLD AUTO: 5.9 THOUSAND/UL (ref 4.31–10.16)

## 2020-10-10 PROCEDURE — 85025 COMPLETE CBC W/AUTO DIFF WBC: CPT

## 2020-10-10 PROCEDURE — G0103 PSA SCREENING: HCPCS

## 2020-10-10 PROCEDURE — 36415 COLL VENOUS BLD VENIPUNCTURE: CPT

## 2020-10-10 PROCEDURE — 80061 LIPID PANEL: CPT

## 2020-10-10 PROCEDURE — 80053 COMPREHEN METABOLIC PANEL: CPT

## 2020-10-10 PROCEDURE — 84443 ASSAY THYROID STIM HORMONE: CPT

## 2020-10-31 ENCOUNTER — OFFICE VISIT (OUTPATIENT)
Dept: FAMILY MEDICINE CLINIC | Facility: CLINIC | Age: 70
End: 2020-10-31
Payer: MEDICARE

## 2020-10-31 VITALS
HEART RATE: 61 BPM | HEIGHT: 69 IN | TEMPERATURE: 96.3 F | SYSTOLIC BLOOD PRESSURE: 108 MMHG | DIASTOLIC BLOOD PRESSURE: 68 MMHG | WEIGHT: 165 LBS | OXYGEN SATURATION: 99 % | BODY MASS INDEX: 24.44 KG/M2

## 2020-10-31 DIAGNOSIS — Z12.11 SCREENING FOR COLON CANCER: ICD-10-CM

## 2020-10-31 DIAGNOSIS — E78.00 HYPERCHOLESTEROLEMIA: ICD-10-CM

## 2020-10-31 DIAGNOSIS — L84 FOOT CALLUS: ICD-10-CM

## 2020-10-31 DIAGNOSIS — Z00.00 MEDICARE ANNUAL WELLNESS VISIT, SUBSEQUENT: Primary | ICD-10-CM

## 2020-10-31 DIAGNOSIS — Z12.5 SCREENING FOR PROSTATE CANCER: ICD-10-CM

## 2020-10-31 DIAGNOSIS — Z13.31 NEGATIVE DEPRESSION SCREENING: ICD-10-CM

## 2020-10-31 PROCEDURE — 99213 OFFICE O/P EST LOW 20 MIN: CPT | Performed by: FAMILY MEDICINE

## 2020-10-31 PROCEDURE — 1123F ACP DISCUSS/DSCN MKR DOCD: CPT | Performed by: FAMILY MEDICINE

## 2020-10-31 PROCEDURE — G0438 PPPS, INITIAL VISIT: HCPCS | Performed by: FAMILY MEDICINE

## 2021-12-10 ENCOUNTER — OFFICE VISIT (OUTPATIENT)
Dept: FAMILY MEDICINE CLINIC | Facility: CLINIC | Age: 71
End: 2021-12-10
Payer: MEDICARE

## 2021-12-10 VITALS
WEIGHT: 162 LBS | HEIGHT: 69 IN | HEART RATE: 72 BPM | TEMPERATURE: 98 F | DIASTOLIC BLOOD PRESSURE: 62 MMHG | BODY MASS INDEX: 23.99 KG/M2 | OXYGEN SATURATION: 99 % | SYSTOLIC BLOOD PRESSURE: 128 MMHG

## 2021-12-10 DIAGNOSIS — E78.00 HYPERCHOLESTEROLEMIA: ICD-10-CM

## 2021-12-10 DIAGNOSIS — Z00.00 MEDICARE ANNUAL WELLNESS VISIT, SUBSEQUENT: Primary | ICD-10-CM

## 2021-12-10 PROCEDURE — G0439 PPPS, SUBSEQ VISIT: HCPCS | Performed by: FAMILY MEDICINE

## 2021-12-10 PROCEDURE — 99213 OFFICE O/P EST LOW 20 MIN: CPT | Performed by: FAMILY MEDICINE

## 2023-01-25 ENCOUNTER — OFFICE VISIT (OUTPATIENT)
Dept: FAMILY MEDICINE CLINIC | Facility: CLINIC | Age: 73
End: 2023-01-25

## 2023-01-25 VITALS
HEART RATE: 51 BPM | WEIGHT: 171.8 LBS | TEMPERATURE: 96 F | OXYGEN SATURATION: 94 % | BODY MASS INDEX: 25.45 KG/M2 | SYSTOLIC BLOOD PRESSURE: 122 MMHG | HEIGHT: 69 IN | DIASTOLIC BLOOD PRESSURE: 68 MMHG

## 2023-01-25 DIAGNOSIS — E78.00 HYPERCHOLESTEROLEMIA: ICD-10-CM

## 2023-01-25 DIAGNOSIS — Z13.6 SCREENING FOR AAA (ABDOMINAL AORTIC ANEURYSM): ICD-10-CM

## 2023-01-25 DIAGNOSIS — Z12.2 ENCOUNTER FOR SCREENING FOR LUNG CANCER: ICD-10-CM

## 2023-01-25 DIAGNOSIS — Z00.00 MEDICARE ANNUAL WELLNESS VISIT, SUBSEQUENT: Primary | ICD-10-CM

## 2023-01-25 DIAGNOSIS — Z13.1 DIABETES MELLITUS SCREENING: ICD-10-CM

## 2023-01-25 DIAGNOSIS — Z13.9 SCREENING DUE: ICD-10-CM

## 2023-01-25 DIAGNOSIS — Z11.59 NEED FOR HEPATITIS C SCREENING TEST: ICD-10-CM

## 2023-01-25 DIAGNOSIS — F17.219 CIGARETTE NICOTINE DEPENDENCE WITH NICOTINE-INDUCED DISORDER: ICD-10-CM

## 2023-01-25 DIAGNOSIS — Z23 ENCOUNTER FOR IMMUNIZATION: ICD-10-CM

## 2023-01-25 DIAGNOSIS — Z86.39 PERSONAL HISTORY OF OTHER ENDOCRINE, NUTRITIONAL AND METABOLIC DISEASE: ICD-10-CM

## 2023-01-25 NOTE — PROGRESS NOTES
Assessment and Plan:   AWV, patient is mostly doing well but requires screening for multiple conditions such as lung cancer and AAA, as well as hyperlipidemia and diabetes mellitus screening  Patient will receive flu vaccine today  Will follow up in 1 month to discuss results of screening  Problem List Items Addressed This Visit        Other    Hypercholesterolemia    Relevant Orders    Lipid panel   Other Visit Diagnoses     Medicare annual wellness visit, subsequent    -  Primary    Encounter for screening for lung cancer        Need for hepatitis C screening test        Encounter for immunization        Relevant Orders    influenza vaccine, high-dose, PF 0 7 mL (FLUZONE HIGH-DOSE) (Completed)    Screening due        Relevant Orders    CBC and differential    Comprehensive metabolic panel    Cigarette nicotine dependence with nicotine-induced disorder        Relevant Orders    CT lung screening program    Diabetes mellitus screening        Relevant Orders    Hemoglobin A1C    Screening for AAA (abdominal aortic aneurysm)        Relevant Orders    US abdominal aorta screening aaa    Personal history of other endocrine, nutritional and metabolic disease        Relevant Orders    Hemoglobin A1C        BMI Counseling: Body mass index is 25 37 kg/m²  The BMI is above normal  Nutrition recommendations include encouraging healthy choices of fruits and vegetables  Exercise recommendations include moderate physical activity 150 minutes/week and exercising 3-5 times per week  Rationale for BMI follow-up plan is due to patient being overweight or obese  Depression Screening and Follow-up Plan: Patient was screened for depression during today's encounter  They screened negative with a PHQ-2 score of 0  Tobacco Cessation Counseling:  The patient is sincerely urged to quit consumption of tobacco  He is not ready to quit tobacco        Preventive health issues were discussed with patient, and age appropriate screening tests were ordered as noted in patient's After Visit Summary  Personalized health advice and appropriate referrals for health education or preventive services given if needed, as noted in patient's After Visit Summary  History of Present Illness:     Patient presents for a Medicare Wellness Visit    CC: AWV, 2 months cough  No systemic symptoms  Cough x2 months with rare clear phlegm production  No worsening of chronic years long night sweats, no significant weight loss  Does not some papules on the skin consistent with seborrheic keratoses that occasionally itch, but have not grown in size over time  Denies chest pain, leg swelling, SOB  Smoked 60-80 pack years  Not on any medications  Declines hepatitis screening  Declines pneumococcal, accepts flu shot today  Sees dentist every 3 months  Works at [employer] part time but is otherwise retired     Patient Care Team:  Charles Tobar DO as PCP - General (Family Medicine)  Janeice Dakins, MD Scotty Berkeley, CRNP     Review of Systems:     Review of Systems   Constitutional: Negative for chills and fever  HENT: Negative for ear pain and sore throat  Eyes: Negative for pain and visual disturbance  Respiratory: Positive for cough  Negative for choking, chest tightness, shortness of breath and wheezing  Cardiovascular: Negative for chest pain and palpitations  Gastrointestinal: Negative for abdominal pain, constipation, diarrhea, nausea and vomiting  Genitourinary: Negative for dysuria and hematuria  Musculoskeletal: Positive for arthralgias (right knee mild chronic)  Negative for back pain  Skin: Negative for color change and rash  Neurological: Negative for seizures and syncope  Psychiatric/Behavioral: Negative for confusion  All other systems reviewed and are negative         Problem List:     Patient Active Problem List   Diagnosis   • Negative depression screening   • Hypercholesterolemia      Past Medical and Surgical History:     Past Medical History:   Diagnosis Date   • BPH with obstruction/lower urinary tract symptoms    • Cystitis, chronic    • Epididymo-orchitis    • Feeling of incomplete bladder emptying    • Frequency of micturition    • Nocturia    • Other microscopic hematuria    • Poor urinary stream    • Simple renal cyst      Past Surgical History:   Procedure Laterality Date   • CYSTOSCOPY  2012      Family History:     Family History   Problem Relation Age of Onset   • Diabetes Mother    • Hypertension Mother    • No Known Problems Father       Social History:     Social History     Socioeconomic History   • Marital status: /Civil Union     Spouse name: None   • Number of children: None   • Years of education: None   • Highest education level: None   Occupational History   • None   Tobacco Use   • Smoking status: Some Days     Packs/day: 0 50     Years: 1 25     Pack years: 0 63     Types: Cigarettes   • Smokeless tobacco: Never   Substance and Sexual Activity   • Alcohol use: Never   • Drug use: Never   • Sexual activity: None   Other Topics Concern   • None   Social History Narrative   • None     Social Determinants of Health     Financial Resource Strain: Not on file   Food Insecurity: Not on file   Transportation Needs: Not on file   Physical Activity: Not on file   Stress: Not on file   Social Connections: Not on file   Intimate Partner Violence: Not on file   Housing Stability: Not on file      Medications and Allergies:     No current outpatient medications on file  No current facility-administered medications for this visit       No Known Allergies   Immunizations:     Immunization History   Administered Date(s) Administered   • COVID-19 MODERNA VACC 0 5 ML IM 03/31/2021, 04/28/2021   • Influenza, high dose seasonal 0 7 mL 01/25/2023   • Influenza, seasonal, injectable 10/10/2014   • Pneumococcal Conjugate 13-Valent 10/25/2018   • Tdap 06/18/2014      Health Maintenance:         Topic Date Due   • Hepatitis C Screening  Never done   • Colorectal Cancer Screening  11/21/2023         Topic Date Due   • Hepatitis A Vaccine (1 of 2 - Risk 2-dose series) Never done   • Hepatitis B Vaccine (1 of 3 - Risk 3-dose series) Never done   • Pneumococcal Vaccine: 65+ Years (2 - PPSV23 if available, else PCV20) 10/25/2019   • COVID-19 Vaccine (3 - Booster for Reuel Artemio series) 06/23/2021      Medicare Screening Tests and Risk Assessments:     Ana Lilia Rooney is here for his Subsequent Wellness visit  Last Medicare Wellness visit information reviewed, patient interviewed and updates made to the record today  Health Risk Assessment:   Patient rates overall health as good  Patient feels that their physical health rating is same  Patient is satisfied with their life  Eyesight was rated as slightly worse  Hearing was rated as same  Patient feels that their emotional and mental health rating is same  Patients states they are never, rarely angry  Patient states they are sometimes unusually tired/fatigued  Pain experienced in the last 7 days has been none  Patient states that he has experienced no weight loss or gain in last 6 months  Depression Screening:   PHQ-2 Score: 0      Fall Risk Screening: In the past year, patient has experienced: no history of falling in past year      Home Safety:  Patient does not have trouble with stairs inside or outside of their home  Patient has working smoke alarms and has working carbon monoxide detector  Home safety hazards include: none  Nutrition:   Current diet is Regular  Medications:   Patient is not currently taking any over-the-counter supplements  Patient is able to manage medications  Activities of Daily Living (ADLs)/Instrumental Activities of Daily Living (IADLs):   Walk and transfer into and out of bed and chair?: Yes  Dress and groom yourself?: Yes    Bathe or shower yourself?: Yes    Feed yourself?  Yes  Do your laundry/housekeeping?: Yes  Manage your money, pay your bills and track your expenses?: Yes  Make your own meals?: Yes    Do your own shopping?: Yes    Previous Hospitalizations:   Any hospitalizations or ED visits within the last 12 months?: No      Advance Care Planning:   Living will: Yes    Durable POA for healthcare: Yes    Advanced directive: Yes    Advanced directive counseling given: No    Five wishes given: No    Patient declined ACP directive: No    End of Life Decisions reviewed with patient: Yes    Provider agrees with end of life decisions: Yes      Cognitive Screening:   Provider or family/friend/caregiver concerned regarding cognition?: No    PREVENTIVE SCREENINGS      Cardiovascular Screening:    General: History Lipid Disorder and Risks and Benefits Discussed      Diabetes Screening:       Due for: Blood Glucose      Colorectal Cancer Screening:     General: Screening Current      Prostate Cancer Screening:      Due for: PSA      Osteoporosis Screening:    General: Screening Not Indicated      Abdominal Aortic Aneurysm (AAA) Screening:    Risk factors include: age between 73-69 yo and tobacco use        General: Risks and Benefits Discussed    Due for: Screening AAA Ultrasound      Lung Cancer Screening:     General: Risks and Benefits Discussed    Due for: Low Dose CT (LDCT)      Hepatitis C Screening:    General: Patient Declines    Screening, Brief Intervention, and Referral to Treatment (SBIRT)    Screening  Typical number of drinks in a day: 0  Typical number of drinks in a week: 0  Interpretation: Low risk drinking behavior  Single Item Drug Screening:  How often have you used an illegal drug (including marijuana) or a prescription medication for non-medical reasons in the past year? never    Single Item Drug Screen Score: 0  Interpretation: Negative screen for possible drug use disorder    No results found       Physical Exam:     /68 (BP Location: Left arm, Patient Position: Sitting)   Pulse (!) 51   Temp (!) 96 °F (35 6 °C) (Tympanic)   Ht 5' 9" (1 753 m)   Wt 77 9 kg (171 lb 12 8 oz)   SpO2 94%   BMI 25 37 kg/m²     Physical Exam  Vitals and nursing note reviewed  Constitutional:       General: He is not in acute distress  Appearance: Normal appearance  He is well-developed  He is not ill-appearing  HENT:      Head: Normocephalic and atraumatic  Nose: No rhinorrhea  Mouth/Throat:      Mouth: Mucous membranes are moist       Pharynx: No oropharyngeal exudate or posterior oropharyngeal erythema  Comments: Teeth yellow with significant dental work, no masses or swelling noted  Eyes:      General: No scleral icterus  Right eye: No discharge  Left eye: No discharge  Conjunctiva/sclera: Conjunctivae normal    Cardiovascular:      Rate and Rhythm: Normal rate and regular rhythm  Pulses: Normal pulses  Heart sounds: Normal heart sounds  No murmur heard  No friction rub  No gallop  Pulmonary:      Effort: Pulmonary effort is normal  No respiratory distress  Breath sounds: Normal breath sounds  No wheezing, rhonchi or rales  Abdominal:      General: Bowel sounds are normal  There is no distension  Palpations: Abdomen is soft  There is no mass  Tenderness: There is no abdominal tenderness  There is no guarding or rebound  Musculoskeletal:         General: No swelling  Cervical back: Neck supple  Skin:     General: Skin is warm and dry  Capillary Refill: Capillary refill takes less than 2 seconds  Coloration: Skin is not jaundiced or pale  Findings: No erythema  Comments: Scattered few seborrheic keratoses of back and one on the face   Neurological:      Mental Status: He is alert     Psychiatric:         Mood and Affect: Mood normal          Behavior: Behavior normal           Young Galvin MD

## 2023-01-30 ENCOUNTER — HOSPITAL ENCOUNTER (OUTPATIENT)
Dept: ULTRASOUND IMAGING | Facility: HOSPITAL | Age: 73
Discharge: HOME | End: 2023-01-30

## 2023-01-30 ENCOUNTER — APPOINTMENT (OUTPATIENT)
Dept: LAB | Facility: CLINIC | Age: 73
End: 2023-01-30

## 2023-01-30 ENCOUNTER — HOSPITAL ENCOUNTER (OUTPATIENT)
Dept: CT IMAGING | Facility: HOSPITAL | Age: 73
Discharge: HOME | End: 2023-01-30

## 2023-01-30 DIAGNOSIS — E78.00 HYPERCHOLESTEROLEMIA: ICD-10-CM

## 2023-01-30 DIAGNOSIS — Z13.1 DIABETES MELLITUS SCREENING: ICD-10-CM

## 2023-01-30 DIAGNOSIS — Z13.9 SCREENING DUE: ICD-10-CM

## 2023-01-30 DIAGNOSIS — F17.219 CIGARETTE NICOTINE DEPENDENCE WITH NICOTINE-INDUCED DISORDER: ICD-10-CM

## 2023-01-30 DIAGNOSIS — Z13.6 SCREENING FOR AAA (ABDOMINAL AORTIC ANEURYSM): ICD-10-CM

## 2023-01-30 DIAGNOSIS — Z86.39 PERSONAL HISTORY OF OTHER ENDOCRINE, NUTRITIONAL AND METABOLIC DISEASE: ICD-10-CM

## 2023-01-30 LAB
ALBUMIN SERPL BCP-MCNC: 3.7 G/DL (ref 3.5–5)
ALP SERPL-CCNC: 48 U/L (ref 46–116)
ALT SERPL W P-5'-P-CCNC: 22 U/L (ref 12–78)
ANION GAP SERPL CALCULATED.3IONS-SCNC: 1 MMOL/L (ref 4–13)
AST SERPL W P-5'-P-CCNC: 18 U/L (ref 5–45)
BASOPHILS # BLD AUTO: 0.03 THOUSANDS/ÂΜL (ref 0–0.1)
BASOPHILS NFR BLD AUTO: 1 % (ref 0–1)
BILIRUB SERPL-MCNC: 0.61 MG/DL (ref 0.2–1)
BUN SERPL-MCNC: 21 MG/DL (ref 5–25)
CALCIUM SERPL-MCNC: 9.1 MG/DL (ref 8.3–10.1)
CHLORIDE SERPL-SCNC: 112 MMOL/L (ref 96–108)
CHOLEST SERPL-MCNC: 162 MG/DL
CO2 SERPL-SCNC: 30 MMOL/L (ref 21–32)
CREAT SERPL-MCNC: 0.85 MG/DL (ref 0.6–1.3)
EOSINOPHIL # BLD AUTO: 0.09 THOUSAND/ÂΜL (ref 0–0.61)
EOSINOPHIL NFR BLD AUTO: 2 % (ref 0–6)
ERYTHROCYTE [DISTWIDTH] IN BLOOD BY AUTOMATED COUNT: 13.2 % (ref 11.6–15.1)
EST. AVERAGE GLUCOSE BLD GHB EST-MCNC: 114 MG/DL
GFR SERPL CREATININE-BSD FRML MDRD: 87 ML/MIN/1.73SQ M
GLUCOSE P FAST SERPL-MCNC: 98 MG/DL (ref 65–99)
HBA1C MFR BLD: 5.6 %
HCT VFR BLD AUTO: 42.5 % (ref 36.5–49.3)
HDLC SERPL-MCNC: 44 MG/DL
HGB BLD-MCNC: 13.9 G/DL (ref 12–17)
IMM GRANULOCYTES # BLD AUTO: 0.02 THOUSAND/UL (ref 0–0.2)
IMM GRANULOCYTES NFR BLD AUTO: 0 % (ref 0–2)
LDLC SERPL CALC-MCNC: 107 MG/DL (ref 0–100)
LYMPHOCYTES # BLD AUTO: 1.57 THOUSANDS/ÂΜL (ref 0.6–4.47)
LYMPHOCYTES NFR BLD AUTO: 29 % (ref 14–44)
MCH RBC QN AUTO: 31.4 PG (ref 26.8–34.3)
MCHC RBC AUTO-ENTMCNC: 32.7 G/DL (ref 31.4–37.4)
MCV RBC AUTO: 96 FL (ref 82–98)
MONOCYTES # BLD AUTO: 0.63 THOUSAND/ÂΜL (ref 0.17–1.22)
MONOCYTES NFR BLD AUTO: 11 % (ref 4–12)
NEUTROPHILS # BLD AUTO: 3.17 THOUSANDS/ÂΜL (ref 1.85–7.62)
NEUTS SEG NFR BLD AUTO: 57 % (ref 43–75)
NONHDLC SERPL-MCNC: 118 MG/DL
NRBC BLD AUTO-RTO: 0 /100 WBCS
PLATELET # BLD AUTO: 200 THOUSANDS/UL (ref 149–390)
PMV BLD AUTO: 10 FL (ref 8.9–12.7)
POTASSIUM SERPL-SCNC: 4.6 MMOL/L (ref 3.5–5.3)
PROT SERPL-MCNC: 7.1 G/DL (ref 6.4–8.4)
RBC # BLD AUTO: 4.43 MILLION/UL (ref 3.88–5.62)
SODIUM SERPL-SCNC: 143 MMOL/L (ref 135–147)
TRIGL SERPL-MCNC: 55 MG/DL
WBC # BLD AUTO: 5.51 THOUSAND/UL (ref 4.31–10.16)

## 2023-02-06 ENCOUNTER — PREP FOR PROCEDURE (OUTPATIENT)
Dept: INTERVENTIONAL RADIOLOGY/VASCULAR | Facility: CLINIC | Age: 73
End: 2023-02-06

## 2023-02-06 DIAGNOSIS — R91.8 MASS OF LOWER LOBE OF LEFT LUNG: Primary | ICD-10-CM

## 2023-02-06 DIAGNOSIS — R91.8 MASS OF LEFT LUNG: Primary | ICD-10-CM

## 2023-02-23 ENCOUNTER — HOSPITAL ENCOUNTER (OUTPATIENT)
Dept: CT IMAGING | Facility: HOSPITAL | Age: 73
Discharge: HOME | End: 2023-02-23
Attending: RADIOLOGY

## 2023-02-23 ENCOUNTER — HOSPITAL ENCOUNTER (OUTPATIENT)
Dept: RADIOLOGY | Facility: HOSPITAL | Age: 73
End: 2023-02-23
Attending: RADIOLOGY

## 2023-02-23 ENCOUNTER — HOSPITAL ENCOUNTER (OUTPATIENT)
Dept: RADIOLOGY | Facility: HOSPITAL | Age: 73
End: 2023-02-23

## 2023-02-23 VITALS
RESPIRATION RATE: 16 BRPM | OXYGEN SATURATION: 97 % | HEART RATE: 56 BPM | SYSTOLIC BLOOD PRESSURE: 113 MMHG | DIASTOLIC BLOOD PRESSURE: 73 MMHG | TEMPERATURE: 97.7 F

## 2023-02-23 DIAGNOSIS — R91.8 MASS OF LEFT LUNG: ICD-10-CM

## 2023-02-23 DIAGNOSIS — Z98.890 HISTORY OF LUNG BIOPSY: ICD-10-CM

## 2023-02-23 LAB
INR PPP: 0.92 (ref 0.84–1.19)
PROTHROMBIN TIME: 13.1 SECONDS (ref 11.6–14.5)

## 2023-02-23 RX ORDER — MIDAZOLAM HYDROCHLORIDE 2 MG/2ML
INJECTION, SOLUTION INTRAMUSCULAR; INTRAVENOUS AS NEEDED
Status: COMPLETED | OUTPATIENT
Start: 2023-02-23 | End: 2023-02-23

## 2023-02-23 RX ORDER — FENTANYL CITRATE 50 UG/ML
INJECTION, SOLUTION INTRAMUSCULAR; INTRAVENOUS AS NEEDED
Status: COMPLETED | OUTPATIENT
Start: 2023-02-23 | End: 2023-02-23

## 2023-02-23 RX ADMIN — MIDAZOLAM 2 MG: 1 INJECTION INTRAMUSCULAR; INTRAVENOUS at 08:55

## 2023-02-23 RX ADMIN — FENTANYL CITRATE 100 MCG: 50 INJECTION, SOLUTION INTRAMUSCULAR; INTRAVENOUS at 08:55

## 2023-02-23 NOTE — DISCHARGE INSTRUCTIONS
Needle Biopsy of the Lung    WHAT YOU NEED TO KNOW:  A needle biopsy of the lung is a procedure to remove cells or tissue from your lung  You may have a fine needle aspiration biopsy (FNAB), or a core needle biopsy (CNB)  A FNAB is used to remove cells through a thin needle  CNB uses a thicker needle to remove lung tissue  The samples are collected and tested for inflammation, infection, or cancer  DISCHARGE INSTRUCTIONS:   Resume your normal diet  Small sips of flat soda will help with nausea  Limit your activity for 24 hours  Wound Care:      - Remove band aid in 24 hours      Contact Interventional Radiology at [phone] [place] PATIENTS: Contact Interventional Radiology at [phone]) David Duong PATIENTS: Contact Interventional Radiology at [phone]) if any of the following occur:    - You have a fever greater than 101*    - You cough up large amounts of bright red blood     - You have chest pain with breathing    - You have shortness of breath    -You have persistent nausea and vomiting    - You have pus, redness or swelling around your biopsy site    - You have questions or concerns about your condition or care

## 2023-02-23 NOTE — H&P
Interventional Radiology  History and Physical 2/23/2023     Gigi Montoya   1950   [id number]    Assessment/Plan:  68 yo male with 61 PY smoking history with LLL masslike opacity on screening lung CT 1/30/2023 for CT-guided bx  Problem List Items Addressed This Visit    None  Visit Diagnoses     Mass of left lung        Relevant Orders    IR biopsy lung             Subjective:     Patient ID: Gigi Montoya is a 67 y o  male  History of Present Illness  68 yo male with 61 PY smoking history with LLL masslike opacity on screening lung CT 1/30/2023 for CT-guided bx  Review of Systems      Past Medical History:   Diagnosis Date   • BPH with obstruction/lower urinary tract symptoms    • Cystitis, chronic    • Epididymo-orchitis    • Feeling of incomplete bladder emptying    • Frequency of micturition    • Nocturia    • Other microscopic hematuria    • Poor urinary stream    • Simple renal cyst         Past Surgical History:   Procedure Laterality Date   • CYSTOSCOPY  2012        Social History     Tobacco Use   Smoking Status Some Days   • Packs/day: 1 00   • Years: 60 00   • Pack years: 60 00   • Types: Cigarettes   Smokeless Tobacco Never   Tobacco Comments    1/25/23 Patient noted smoking that equates to 60-80 pack years, exact pack per day amount varies over time        Social History     Substance and Sexual Activity   Alcohol Use Never        Social History     Substance and Sexual Activity   Drug Use Never        No Known Allergies    No current outpatient medications on file       Current Facility-Administered Medications   Medication Dose Route Frequency Provider Last Rate Last Admin   • fentanyl citrate (PF) 100 MCG/2ML   Intravenous PRN Markos Orta MD   100 mcg at 02/23/23 4041   • midazolam (VERSED) injection    PRN Markos Orta MD   2 mg at 02/23/23 0855          Objective:    Vitals:    02/23/23 0900 02/23/23 0905 02/23/23 0910 02/23/23 0915   BP: 107/66 105/65 118/65 106/62   BP Location:       Pulse: 65 61 58 58   Resp: 17 16 12 18   Temp:       TempSrc:       SpO2: 100% 100% 100% 100%        Physical Exam  Vitals reviewed  Constitutional:       Appearance: Normal appearance  He is normal weight  HENT:      Head: Normocephalic and atraumatic  Nose: Nose normal       Mouth/Throat:      Mouth: Mucous membranes are dry  Pharynx: Oropharynx is clear  Eyes:      Extraocular Movements: Extraocular movements intact  Conjunctiva/sclera: Conjunctivae normal       Pupils: Pupils are equal, round, and reactive to light  Cardiovascular:      Rate and Rhythm: Normal rate and regular rhythm  Pulses: Normal pulses  Pulmonary:      Effort: Pulmonary effort is normal    Abdominal:      General: Abdomen is flat  Musculoskeletal:         General: Normal range of motion  Cervical back: Normal range of motion and neck supple  Skin:     General: Skin is warm and dry  Neurological:      General: No focal deficit present  Mental Status: He is alert  Psychiatric:         Mood and Affect: Mood normal          Behavior: Behavior normal            No results found for: BNP   Lab Results   Component Value Date    WBC 5 51 01/30/2023    HGB 13 9 01/30/2023    HCT 42 5 01/30/2023    MCV 96 01/30/2023     01/30/2023     Lab Results   Component Value Date    INR 0 92 02/23/2023    PROTIME 13 1 02/23/2023     No results found for: PTT      I have personally reviewed pertinent imaging and laboratory results  Code Status: No Order  Advance Directive and Living Will:      Power of :    POLST:      This text is generated with voice recognition software  There may be translation, syntax,  or grammatical errors  If you have any questions, please contact the dictating provider

## 2023-02-23 NOTE — BRIEF OP NOTE (RAD/CATH)
INTERVENTIONAL RADIOLOGY PROCEDURE NOTE    Date: 2/23/2023    Procedure:   Procedure Summary     Date: 02/23/23 Room / Location: [address] CAT Scan    Anesthesia Start:  Anesthesia Stop:     Procedure: IR BIOPSY LUNG Diagnosis:       Mass of left lung      (Left lung mass)    Scheduled Providers:  Responsible Provider:     Anesthesia Type: Not recorded ASA Status: Not recorded          Preoperative diagnosis:   1  Mass of left lung         Postoperative diagnosis: Same  Surgeon: Emily Dugan MD     Assistant: None  No qualified resident was available  Blood loss: None    Specimens: Yes     Findings: Successful 18G core bx x 4 LLL masslike opacity     Complications: None immediate      Anesthesia: conscious sedation and local

## 2023-02-23 NOTE — SEDATION DOCUMENTATION
Left lung bx completed  Band aid to site  No blood in oral cavity noted  Awake and hemodynamically stable for transfer to PACU  Report and care given to primary RN

## 2023-02-27 ENCOUNTER — TELEPHONE (OUTPATIENT)
Dept: FAMILY MEDICINE CLINIC | Facility: CLINIC | Age: 73
End: 2023-02-27

## 2023-02-27 DIAGNOSIS — C34.92 ADENOCARCINOMA, LUNG, LEFT (HCC): Primary | ICD-10-CM

## 2023-02-27 NOTE — TELEPHONE ENCOUNTER
Please refer to biopsy results  Patient wants to know if there is another scan needed to be done, also would like a referral to hematology/oncology

## 2023-03-06 ENCOUNTER — CONSULT (OUTPATIENT)
Dept: HEMATOLOGY ONCOLOGY | Facility: MEDICAL CENTER | Age: 73
End: 2023-03-06

## 2023-03-06 ENCOUNTER — TELEPHONE (OUTPATIENT)
Dept: HEMATOLOGY ONCOLOGY | Facility: MEDICAL CENTER | Age: 73
End: 2023-03-06

## 2023-03-06 VITALS
HEIGHT: 69 IN | SYSTOLIC BLOOD PRESSURE: 138 MMHG | TEMPERATURE: 97.4 F | DIASTOLIC BLOOD PRESSURE: 70 MMHG | BODY MASS INDEX: 25.24 KG/M2 | HEART RATE: 56 BPM | RESPIRATION RATE: 18 BRPM | WEIGHT: 170.4 LBS | OXYGEN SATURATION: 95 %

## 2023-03-06 DIAGNOSIS — C34.92 ADENOCARCINOMA, LUNG, LEFT (HCC): Primary | ICD-10-CM

## 2023-03-06 DIAGNOSIS — C34.02 MALIGNANT NEOPLASM OF LEFT MAIN BRONCHUS (HCC): ICD-10-CM

## 2023-03-06 NOTE — TELEPHONE ENCOUNTER
Received Paperwork   What type of form FMLA/Short Term Disability   Scanned blank form into patient's Epic chart Yes   Method received form Patient Dropped off   Provider responsible for form Dr Francheska Peraza   Informed patient our office turn around time for completing patient forms is 5-7 business days  Yes     Comments Patient will  completed form   Will need forms completed by 3/15/23
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
## 2023-03-08 ENCOUNTER — TELEPHONE (OUTPATIENT)
Dept: HEMATOLOGY ONCOLOGY | Facility: MEDICAL CENTER | Age: 73
End: 2023-03-08

## 2023-03-08 ENCOUNTER — DOCUMENTATION (OUTPATIENT)
Dept: CARDIAC SURGERY | Facility: CLINIC | Age: 73
End: 2023-03-08

## 2023-03-08 ENCOUNTER — OFFICE VISIT (OUTPATIENT)
Dept: CARDIAC SURGERY | Facility: CLINIC | Age: 73
End: 2023-03-08

## 2023-03-08 VITALS
DIASTOLIC BLOOD PRESSURE: 74 MMHG | RESPIRATION RATE: 20 BRPM | BODY MASS INDEX: 25.01 KG/M2 | HEART RATE: 56 BPM | WEIGHT: 168.87 LBS | SYSTOLIC BLOOD PRESSURE: 136 MMHG | OXYGEN SATURATION: 98 % | HEIGHT: 69 IN | TEMPERATURE: 97.2 F

## 2023-03-08 DIAGNOSIS — C34.92 ADENOCARCINOMA, LUNG, LEFT (HCC): Primary | ICD-10-CM

## 2023-03-08 NOTE — PROGRESS NOTES
I met with Tarshane Julian and his wife at his visit with Dr Sena Campo today  I introduced myself and explained my role to them  Questions answered, support provided  They were given my business card for any future questions or concerns  Messaged dr Zainab Choudhury team to see if possible for sooner appointment than 4/6/23

## 2023-03-08 NOTE — TELEPHONE ENCOUNTER
----- Message from Obdulio Aguilera RN sent at 3/8/2023  1:42 PM EST -----  Dr Cary Maxwell,  Tamica Pérez has a follow up appointment with you on 4/6/23  PET/CT is scheduled for 3/20/23  Dr Sudhir Monte is requesting if possible can you see him sooner? He would like him to begin treatment by the end of the month if feasible    Jimmy Camejo RN,BSN    Will move up appt once PET scan results are known

## 2023-03-08 NOTE — PROGRESS NOTES
Thoracic Consult  Assessment/Plan:    Adenocarcinoma, lung, left ([address] )  We had a discussion with Mr Laurel Carballo after personally reviewing his medical history and imaging  He has a left lower lobe biopsy proven adenocarcinoma of the lung, which is clinical stage II secondary to size of the tumor (4 7 cm)  He is clinically stage N0  He will be undergoing a PET scan and a brain MRI within the next 2 weeks  We will have him return to the office after the above testing and PFT's to discuss next steps  If he is a surgical candidate, Dr Jonathan Cano would recommend neoadjuvant chemotherapy/immunotherapy  He is in agreement with the plan  Diagnoses and all orders for this visit:    Adenocarcinoma, lung, left ([facility] [address] )  -     Complete PFT with post bronchodilator; Future             Thoracic History   Diagnosis: Lung cancer    Procedures/Surgeries:    Pathology:    Adjuvant Therapy:          Patient ID: Billy Salinas is a 67 y o  male  ECOG 0     HPI    Mr Laurel Carballo is a 66 yo gentleman with a history of tobacco abuse who presents today with a lung cancer  A lung cancer screening CT performed on 1/30/23 which revealed a left lower lobe lobulated density measuring 2 6 x 4 7 x 3 8 cm, with additional 4 mm nodules in the lung apices  IR biopsy from 2/23/23 revealed adenocarcinoma of the lung  MRI of the brain is scheduled for 3/10/23 and PET scan is scheduled for 3/20/23  He does not have any PFT's to date  On discussion, he quit smoking 3 weeks ago  He was a previous 20 ppy smoker  He has no prior chest surgery, he does not take any blood thinners and no previous medical history       The following portions of the patient's history were reviewed and updated as appropriate: allergies, current medications, past family history, past medical history, past social history, past surgical history and problem list     Past Medical History:   Diagnosis Date   • BPH with obstruction/lower urinary tract symptoms    • Cystitis, chronic • Epididymo-orchitis    • Feeling of incomplete bladder emptying    • Frequency of micturition    • Nocturia    • Other microscopic hematuria    • Poor urinary stream    • Simple renal cyst       Past Surgical History:   Procedure Laterality Date   • CYSTOSCOPY  2012   • IR BIOPSY LUNG  2/23/2023      Family History   Problem Relation Age of Onset   • Diabetes Mother    • Hypertension Mother    • No Known Problems Father       Social History     Socioeconomic History   • Marital status: /Civil Union     Spouse name: Not on file   • Number of children: Not on file   • Years of education: Not on file   • Highest education level: Not on file   Occupational History   • Not on file   Tobacco Use   • Smoking status: Some Days     Packs/day: 1 00     Years: 60 00     Pack years: 60 00     Types: Cigarettes   • Smokeless tobacco: Never   • Tobacco comments:     1/25/23 Patient noted smoking that equates to 60-80 pack years, exact pack per day amount varies over time   Substance and Sexual Activity   • Alcohol use: Never   • Drug use: Never   • Sexual activity: Not on file   Other Topics Concern   • Not on file   Social History Narrative   • Not on file     Social Determinants of Health     Financial Resource Strain: Not on file   Food Insecurity: Not on file   Transportation Needs: Not on file   Physical Activity: Not on file   Stress: Not on file   Social Connections: Not on file   Intimate Partner Violence: Not on file   Housing Stability: Not on file        No Known Allergies  No current outpatient medications on file prior to visit  No current facility-administered medications on file prior to visit  Review of Systems   Constitutional: Negative  HENT: Positive for congestion and postnasal drip  Negative for trouble swallowing and voice change  Eyes: Positive for visual disturbance (glasses)  Respiratory: Negative for cough, choking and shortness of breath      Cardiovascular: Negative for chest pain and leg swelling  Gastrointestinal: Negative for abdominal pain, nausea and vomiting  Musculoskeletal: Negative for back pain and neck pain  Skin: Negative for pallor  Neurological: Negative for dizziness, light-headedness and headaches  Psychiatric/Behavioral: Negative for agitation, behavioral problems and confusion  All other systems reviewed and are negative  Objective:   Physical Exam  Vitals reviewed  Constitutional:       General: He is not in acute distress  Appearance: Normal appearance  He is well-developed  He is not diaphoretic  HENT:      Head: Normocephalic and atraumatic  Eyes:      General: No scleral icterus  Extraocular Movements: Extraocular movements intact  Comments: glasses   Neck:      Trachea: No tracheal deviation  Cardiovascular:      Rate and Rhythm: Normal rate and regular rhythm  Pulses: Normal pulses  Heart sounds: Normal heart sounds  No murmur heard  Pulmonary:      Effort: Pulmonary effort is normal  No respiratory distress  Breath sounds: Normal breath sounds  No wheezing  Abdominal:      General: Bowel sounds are normal  There is no distension  Palpations: Abdomen is soft  Musculoskeletal:         General: Normal range of motion  Cervical back: Normal range of motion and neck supple  Right lower leg: No edema  Left lower leg: No edema  Lymphadenopathy:      Cervical: No cervical adenopathy  Skin:     General: Skin is warm and dry  Findings: No erythema  Neurological:      Mental Status: He is alert and oriented to person, place, and time  Cranial Nerves: No cranial nerve deficit  Psychiatric:         Mood and Affect: Mood normal          Behavior: Behavior normal          Thought Content:  Thought content normal      /74   Pulse 56   Temp (!) 97 2 °F (36 2 °C)   Resp 20   Ht 5' 9" (1 753 m)   Wt 76 6 kg (168 lb 14 oz)   SpO2 98%   BMI 24 94 kg/m²

## 2023-03-08 NOTE — ASSESSMENT & PLAN NOTE
We had a discussion with Mr Falguni Alexandre after personally reviewing his medical history and imaging  He has a left lower lobe biopsy proven adenocarcinoma of the lung, which is clinical stage II secondary to size of the tumor (4 7 cm)  He is clinically stage N0  He will be undergoing a PET scan and a brain MRI within the next 2 weeks  We will have him return to the office after the above testing and PFT's to discuss next steps  If he is a surgical candidate, Dr Colletta Pitt would recommend neoadjuvant chemotherapy/immunotherapy  He is in agreement with the plan

## 2023-03-10 ENCOUNTER — PATIENT OUTREACH (OUTPATIENT)
Dept: HEMATOLOGY ONCOLOGY | Facility: CLINIC | Age: 73
End: 2023-03-10

## 2023-03-10 ENCOUNTER — DOCUMENTATION (OUTPATIENT)
Dept: HEMATOLOGY ONCOLOGY | Facility: CLINIC | Age: 73
End: 2023-03-10

## 2023-03-10 ENCOUNTER — HOSPITAL ENCOUNTER (OUTPATIENT)
Dept: MRI IMAGING | Facility: HOSPITAL | Age: 73
End: 2023-03-10
Attending: INTERNAL MEDICINE

## 2023-03-10 DIAGNOSIS — C34.92 ADENOCARCINOMA, LUNG, LEFT (HCC): Primary | ICD-10-CM

## 2023-03-10 DIAGNOSIS — C34.02 MALIGNANT NEOPLASM OF LEFT MAIN BRONCHUS (HCC): ICD-10-CM

## 2023-03-10 DIAGNOSIS — C34.92 ADENOCARCINOMA, LUNG, LEFT (HCC): ICD-10-CM

## 2023-03-10 RX ADMIN — GADOBUTROL 7 ML: 604.72 INJECTION INTRAVENOUS at 18:52

## 2023-03-10 NOTE — PROGRESS NOTES
Patient called and stated he saw his dentist today and was told he needs three fillings and one extraction  He was referred to another dentist for the extraction

## 2023-03-10 NOTE — PROGRESS NOTES
Patient aware that Med Onc RN is in the process of working on the paperwork and will reach out to patient once completed

## 2023-03-10 NOTE — PROGRESS NOTES
Intake received/ Chart reviewed: 3/10/23  Received Intake after consults  Pathology completed:  - 2/23/23 Lung Bx    Imaging completed:  - 2/23/23 CXR  - 1/30/23 CT Lung SN  - PET/CT Scheduled on 3/20/23  - MRI Brain scheduled  3/10/23  - PFT's Scheduled 3/17/23    All records needed are in patients chart  No records retrieval needed at this time

## 2023-03-10 NOTE — PROGRESS NOTES
Initial outreach  Called and spoke to patient  Introduced myself and explained the role of a Nurse Navigator  Reviewed upcoming appointments including date, time and location  Assessed for barriers of care  Informed patient his follow-up with Dr Nuris Gama will be moved up sooner after his PET/CT on 3/20/23  He is having his brain MRI today  He denies pain  His weight has been stable and reports a good appetite  He stated he quit smoking completely  He voiced concern about his short term disability paper work being completed by 3/15/23  Informed him I would reach out to Dr Rosina Baxter team to see where they are in the process  I provided my direct contact information for questions or concerns  Informed patient either myself or Oncology Care Coordinator, Amna Chavez would be reaching out periodically  He was appreciative

## 2023-03-13 ENCOUNTER — PATIENT OUTREACH (OUTPATIENT)
Dept: HEMATOLOGY ONCOLOGY | Facility: CLINIC | Age: 73
End: 2023-03-13

## 2023-03-13 ENCOUNTER — TELEPHONE (OUTPATIENT)
Dept: HEMATOLOGY ONCOLOGY | Facility: CLINIC | Age: 73
End: 2023-03-13

## 2023-03-13 NOTE — TELEPHONE ENCOUNTER
Called patient to discuss that FMLA paperwork cannot be fully completed until treatment plan is discussed and planned with Dr Teressa Harris  Patient has follow-up with office 4/6  Per Genie Arellano, will complete paperwork at this time as diagnosis, imaging, and treatment will be discussed in detail at this appointment  Called and discussed above with patient  He will update the company regarding dates for paperwork filled out  Checked R[facility]con's schedule at this time no sooner appointment available  Pt thankful for callback

## 2023-03-13 NOTE — PROGRESS NOTES
MSW received a referral for this pt from Navigation  Pt will be seen by Oncology on [phone] and MSW will plan to make outreach after that date to introduce self, the role of the OSW and complete assessments

## 2023-03-16 ENCOUNTER — HOSPITAL ENCOUNTER (OUTPATIENT)
Dept: RADIOLOGY | Age: 73
Discharge: HOME | End: 2023-03-16
Attending: INTERNAL MEDICINE

## 2023-03-16 DIAGNOSIS — C34.92 ADENOCARCINOMA, LUNG, LEFT (HCC): ICD-10-CM

## 2023-03-16 DIAGNOSIS — C34.02 MALIGNANT NEOPLASM OF LEFT MAIN BRONCHUS (HCC): ICD-10-CM

## 2023-03-16 LAB — GLUCOSE SERPL-MCNC: 84 MG/DL (ref 65–140)

## 2023-03-17 ENCOUNTER — PATIENT OUTREACH (OUTPATIENT)
Dept: HEMATOLOGY ONCOLOGY | Facility: CLINIC | Age: 73
End: 2023-03-17

## 2023-03-17 ENCOUNTER — HOSPITAL ENCOUNTER (OUTPATIENT)
Dept: PULMONOLOGY | Facility: HOSPITAL | Age: 73
End: 2023-03-17

## 2023-03-17 DIAGNOSIS — C34.92 ADENOCARCINOMA, LUNG, LEFT (HCC): ICD-10-CM

## 2023-03-17 RX ORDER — ALBUTEROL SULFATE 2.5 MG/3ML
2.5 SOLUTION RESPIRATORY (INHALATION) ONCE AS NEEDED
Status: COMPLETED | OUTPATIENT
Start: 2023-03-17 | End: 2023-03-17

## 2023-03-17 RX ADMIN — ALBUTEROL SULFATE 2.5 MG: 2.5 SOLUTION RESPIRATORY (INHALATION) at 09:16

## 2023-03-17 NOTE — PROGRESS NOTES
Received call from patient stating he saw the results of his PET/CT and inquired if his follow-up with Dr Teressa Harris could be moved up so he can start his neoadjuvant systemic therapy as soon as possible  He is currently schedules for 4/6/23

## 2023-03-17 NOTE — PROGRESS NOTES
Spoke with patient  PET results will be reviewed by Dr Kimberley Tanner and I will contact patient on 3/20/2023 after Dr Kimberley Tanner has established a plan

## 2023-03-20 ENCOUNTER — TELEPHONE (OUTPATIENT)
Dept: HEMATOLOGY ONCOLOGY | Facility: MEDICAL CENTER | Age: 73
End: 2023-03-20

## 2023-03-20 ENCOUNTER — OFFICE VISIT (OUTPATIENT)
Dept: HEMATOLOGY ONCOLOGY | Facility: MEDICAL CENTER | Age: 73
End: 2023-03-20

## 2023-03-20 VITALS
DIASTOLIC BLOOD PRESSURE: 88 MMHG | HEIGHT: 69 IN | SYSTOLIC BLOOD PRESSURE: 120 MMHG | BODY MASS INDEX: 25.06 KG/M2 | WEIGHT: 169.2 LBS | HEART RATE: 66 BPM | OXYGEN SATURATION: 98 % | RESPIRATION RATE: 20 BRPM | TEMPERATURE: 97.4 F

## 2023-03-20 DIAGNOSIS — C34.92 ADENOCARCINOMA, LUNG, LEFT (HCC): Primary | ICD-10-CM

## 2023-03-20 RX ORDER — ONDANSETRON HYDROCHLORIDE 8 MG/1
TABLET, FILM COATED ORAL EVERY 8 HOURS PRN
Qty: 20 TABLET | Refills: 5 | Status: CANCELLED | OUTPATIENT
Start: 2023-03-20

## 2023-03-20 NOTE — TELEPHONE ENCOUNTER
Spoke with patient  Appt made for 1300 today with Dr Dylan Lo[place] in [id number] [address]
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
## 2023-03-20 NOTE — TELEPHONE ENCOUNTER
Patient seen by Dr Aylin Dodd order entered  Consent signed  Will send to  to set up appt  Patient first choice jeremy Geiger or [facility]  Patient does not have a PAC and does not need ST[facility]

## 2023-03-21 ENCOUNTER — PATIENT OUTREACH (OUTPATIENT)
Dept: HEMATOLOGY ONCOLOGY | Facility: CLINIC | Age: 73
End: 2023-03-21

## 2023-03-21 DIAGNOSIS — C34.92 ADENOCARCINOMA, LUNG, LEFT (HCC): Primary | ICD-10-CM

## 2023-03-21 RX ORDER — ONDANSETRON HYDROCHLORIDE 8 MG/1
8 TABLET, FILM COATED ORAL EVERY 8 HOURS PRN
Qty: 20 TABLET | Refills: 5 | Status: SHIPPED | OUTPATIENT
Start: 2023-03-21

## 2023-03-21 NOTE — PROGRESS NOTES
Initial Outreach: Called and spoke to patient  Introduced myself and explained the role of a Care Coordinator  Reviewed upcoming appointments including date, time and location  Assessed for barriers of care  He resides with his wife  Has support from family/ kids/ wife  He is a former smoker, recently quit (1/2023) once diagnosed with lung cancer  He is currently asymptomatic  Denies symptoms of pain, chest pain, shortness of breath, or cough  Denies sputum or hemoptysis  Appetite is good, weight is stable  Denies nausea or vomiting  Denies constipation or diarrhea  Patient was recently seen/evaluated by thoracic surgery and the plan is for neoadjuvant chemotherapy  Recently f/u with med/onc  Patient scheduled to start treatment on 3/28, every 21 days for 3 cycles  We mutually agreed for me to f/u with him in about two weeks  Provided my direct phone number for questions or concerns moving forward  Patient was appreciative

## 2023-03-25 ENCOUNTER — APPOINTMENT (OUTPATIENT)
Dept: LAB | Facility: CLINIC | Age: 73
End: 2023-03-25

## 2023-03-25 DIAGNOSIS — C34.92 ADENOCARCINOMA, LUNG, LEFT (HCC): ICD-10-CM

## 2023-03-25 LAB
ALBUMIN SERPL BCP-MCNC: 3.5 G/DL (ref 3.5–5)
ALP SERPL-CCNC: 47 U/L (ref 46–116)
ALT SERPL W P-5'-P-CCNC: 34 U/L (ref 12–78)
ANION GAP SERPL CALCULATED.3IONS-SCNC: 1 MMOL/L (ref 4–13)
AST SERPL W P-5'-P-CCNC: 22 U/L (ref 5–45)
BASOPHILS # BLD AUTO: 0.04 THOUSANDS/ÂΜL (ref 0–0.1)
BASOPHILS NFR BLD AUTO: 1 % (ref 0–1)
BILIRUB SERPL-MCNC: 0.46 MG/DL (ref 0.2–1)
BUN SERPL-MCNC: 17 MG/DL (ref 5–25)
CALCIUM SERPL-MCNC: 9 MG/DL (ref 8.3–10.1)
CHLORIDE SERPL-SCNC: 110 MMOL/L (ref 96–108)
CO2 SERPL-SCNC: 30 MMOL/L (ref 21–32)
CREAT SERPL-MCNC: 0.83 MG/DL (ref 0.6–1.3)
EOSINOPHIL # BLD AUTO: 0.07 THOUSAND/ÂΜL (ref 0–0.61)
EOSINOPHIL NFR BLD AUTO: 1 % (ref 0–6)
ERYTHROCYTE [DISTWIDTH] IN BLOOD BY AUTOMATED COUNT: 13.1 % (ref 11.6–15.1)
GFR SERPL CREATININE-BSD FRML MDRD: 87 ML/MIN/1.73SQ M
GLUCOSE P FAST SERPL-MCNC: 95 MG/DL (ref 65–99)
HCT VFR BLD AUTO: 42.7 % (ref 36.5–49.3)
HGB BLD-MCNC: 14 G/DL (ref 12–17)
IMM GRANULOCYTES # BLD AUTO: 0.05 THOUSAND/UL (ref 0–0.2)
IMM GRANULOCYTES NFR BLD AUTO: 1 % (ref 0–2)
LYMPHOCYTES # BLD AUTO: 1.79 THOUSANDS/ÂΜL (ref 0.6–4.47)
LYMPHOCYTES NFR BLD AUTO: 30 % (ref 14–44)
MCH RBC QN AUTO: 31.5 PG (ref 26.8–34.3)
MCHC RBC AUTO-ENTMCNC: 32.8 G/DL (ref 31.4–37.4)
MCV RBC AUTO: 96 FL (ref 82–98)
MONOCYTES # BLD AUTO: 0.64 THOUSAND/ÂΜL (ref 0.17–1.22)
MONOCYTES NFR BLD AUTO: 11 % (ref 4–12)
NEUTROPHILS # BLD AUTO: 3.33 THOUSANDS/ÂΜL (ref 1.85–7.62)
NEUTS SEG NFR BLD AUTO: 56 % (ref 43–75)
NRBC BLD AUTO-RTO: 0 /100 WBCS
PLATELET # BLD AUTO: 236 THOUSANDS/UL (ref 149–390)
PMV BLD AUTO: 10.1 FL (ref 8.9–12.7)
POTASSIUM SERPL-SCNC: 4.2 MMOL/L (ref 3.5–5.3)
PROT SERPL-MCNC: 6.8 G/DL (ref 6.4–8.4)
RBC # BLD AUTO: 4.45 MILLION/UL (ref 3.88–5.62)
SODIUM SERPL-SCNC: 141 MMOL/L (ref 135–147)
T3FREE SERPL-MCNC: 2.18 PG/ML (ref 2.3–4.2)
TSH SERPL DL<=0.05 MIU/L-ACNC: 0.95 UIU/ML (ref 0.45–4.5)
WBC # BLD AUTO: 5.92 THOUSAND/UL (ref 4.31–10.16)

## 2023-03-26 RX ORDER — SODIUM CHLORIDE 9 MG/ML
20 INJECTION, SOLUTION INTRAVENOUS ONCE
Status: CANCELLED | OUTPATIENT
Start: 2023-03-28

## 2023-03-26 RX ORDER — PALONOSETRON 0.05 MG/ML
0.25 INJECTION, SOLUTION INTRAVENOUS ONCE
Status: CANCELLED | OUTPATIENT
Start: 2023-03-28

## 2023-03-28 ENCOUNTER — HOSPITAL ENCOUNTER (OUTPATIENT)
Dept: INFUSION CENTER | Facility: HOSPITAL | Age: 73
Discharge: HOME | End: 2023-03-28
Attending: INTERNAL MEDICINE

## 2023-03-28 VITALS
HEIGHT: 70 IN | OXYGEN SATURATION: 97 % | RESPIRATION RATE: 16 BRPM | HEART RATE: 57 BPM | TEMPERATURE: 96.9 F | WEIGHT: 171.08 LBS | DIASTOLIC BLOOD PRESSURE: 77 MMHG | SYSTOLIC BLOOD PRESSURE: 125 MMHG | BODY MASS INDEX: 24.49 KG/M2

## 2023-03-28 DIAGNOSIS — C34.92 ADENOCARCINOMA, LUNG, LEFT (HCC): Primary | ICD-10-CM

## 2023-03-28 RX ORDER — SODIUM FLUORIDE 6.1 MG/ML
PASTE, DENTIFRICE DENTAL
COMMUNITY
Start: 2023-03-27

## 2023-03-28 RX ORDER — PALONOSETRON 0.05 MG/ML
0.25 INJECTION, SOLUTION INTRAVENOUS ONCE
Status: COMPLETED | OUTPATIENT
Start: 2023-03-28 | End: 2023-03-28

## 2023-03-28 RX ORDER — SODIUM CHLORIDE 9 MG/ML
20 INJECTION, SOLUTION INTRAVENOUS ONCE
Status: COMPLETED | OUTPATIENT
Start: 2023-03-28 | End: 2023-03-28

## 2023-03-28 RX ADMIN — SODIUM CHLORIDE 20 ML/HR: 0.9 INJECTION, SOLUTION INTRAVENOUS at 08:44

## 2023-03-28 RX ADMIN — DIPHENHYDRAMINE HYDROCHLORIDE 25 MG: 50 INJECTION, SOLUTION INTRAMUSCULAR; INTRAVENOUS at 09:10

## 2023-03-28 RX ADMIN — CARBOPLATIN 561.5 MG: 10 INJECTION, SOLUTION INTRAVENOUS at 14:41

## 2023-03-28 RX ADMIN — FOSAPREPITANT 150 MG: 150 INJECTION, POWDER, LYOPHILIZED, FOR SOLUTION INTRAVENOUS at 09:59

## 2023-03-28 RX ADMIN — DEXAMETHASONE SODIUM PHOSPHATE 20 MG: 10 INJECTION, SOLUTION INTRAMUSCULAR; INTRAVENOUS at 08:45

## 2023-03-28 RX ADMIN — PACLITAXEL 336 MG: 6 INJECTION, SOLUTION, CONCENTRATE INTRAVENOUS at 11:17

## 2023-03-28 RX ADMIN — FAMOTIDINE 20 MG: 10 INJECTION INTRAVENOUS at 09:33

## 2023-03-28 RX ADMIN — PALONOSETRON HYDROCHLORIDE 0.25 MG: 0.05 INJECTION, SOLUTION INTRAVENOUS at 08:45

## 2023-03-28 RX ADMIN — SODIUM CHLORIDE 360 MG: 9 INJECTION, SOLUTION INTRAVENOUS at 10:34

## 2023-03-29 ENCOUNTER — OFFICE VISIT (OUTPATIENT)
Dept: CARDIAC SURGERY | Facility: CLINIC | Age: 73
End: 2023-03-29

## 2023-03-29 ENCOUNTER — DOCUMENTATION (OUTPATIENT)
Dept: CARDIAC SURGERY | Facility: CLINIC | Age: 73
End: 2023-03-29

## 2023-03-29 VITALS
HEART RATE: 83 BPM | HEIGHT: 70 IN | BODY MASS INDEX: 24.55 KG/M2 | DIASTOLIC BLOOD PRESSURE: 78 MMHG | RESPIRATION RATE: 17 BRPM | OXYGEN SATURATION: 96 % | SYSTOLIC BLOOD PRESSURE: 126 MMHG | WEIGHT: 171.52 LBS | TEMPERATURE: 98.7 F

## 2023-03-29 DIAGNOSIS — C34.32 PRIMARY ADENOCARCINOMA OF LOWER LOBE OF LEFT LUNG (HCC): ICD-10-CM

## 2023-03-29 DIAGNOSIS — C34.92 ADENOCARCINOMA, LUNG, LEFT (HCC): ICD-10-CM

## 2023-03-29 DIAGNOSIS — C34.92 ADENOCARCINOMA OF LEFT LUNG (HCC): Primary | ICD-10-CM

## 2023-03-29 NOTE — ASSESSMENT & PLAN NOTE
Mr Hamilton Moreno presents for follow-up for a biopsy proven left lower lobe adenocarcinoma clinical Stage IIA (T2b N0 M0)  His imaging and studies were reviewed personally by myself, with Dr Ingrid Carlton and with the patient  The area of FDG activity in his left lower molar is related to dental work he recently had done  He is being seen by Dr Julian Dudley and started neoadjuvant chemotherapy yesterday  He will complete a total of 3 cycles  We will see him on 5/24/2023 after a PET-CT on 5/23/2023 to assess tumor response and possible surgical resection  All questions were answered and he is in agreement with this plan

## 2023-03-29 NOTE — PROGRESS NOTES
I met with Tru Orr at his visit today  He's familiar with me from prior visits  General questions about chemotherapy side effects answered  Support provided  He was asked to call the office if his chemotherapy schedule changes

## 2023-03-29 NOTE — PROGRESS NOTES
Thoracic Follow-Up  Assessment/Plan:    Adenocarcinoma, lung, left [facility])  Mr Yahaira Okaes presents for follow-up for a biopsy proven left lower lobe adenocarcinoma clinical Stage IIA (T2b N0 M0)  His imaging and studies were reviewed personally by myself, with Dr Ariel López and with the patient  The area of FDG activity in his left lower molar is related to dental work he recently had done  He is being seen by Dr Lindsay Salas and started neoadjuvant chemotherapy yesterday  He will complete a total of 3 cycles  We will see him on 5/24/2023 after a PET-CT on 5/23/2023 to assess tumor response and possible surgical resection  All questions were answered and he is in agreement with this plan  Diagnoses and all orders for this visit:    Adenocarcinoma of left lung ([address] )  -     NM PET CT skull base to mid thigh; Future    Primary adenocarcinoma of lower lobe of left lung ([facility] [address] )  -     NM PET CT skull base to mid thigh; Future    Adenocarcinoma, lung, left [facility])          Thoracic History          Subjective:    Patient ID: Gene Orellana is a 67 y o  male  HPI   Diagnosis: Lung cancer    Procedures/Surgeries:     Pathology:     Adjuvant Therapy:           Mr Yahaira Oakes is a 67year old male with a left lower lobe biopsy proved adenocarcinoma of the lung measuring 4 7cm  He was last seen in our office on 3/8/2023 at which point he was recommended for PET-CT and a brain MRI for staging information, as well as PFTs and referral to medical oncology for evaluation for neoadjuvant therapy  PET-CT 3/16/23 revealed focal radiotracer uptake at the left mandible adjavent to a mandibular molar with corresponding lucent lesion on CT suggestivg inflammatory periodontal disease  There was mild radiotracer uptake related to the left lower lobe mass measuring 4 6cm with SUV 2 2  No suspicious FDG uptake in the mediastinal nor perhilar regions  MRI brain 3/10/2023 revealed no evidence of metastatic disease       3/13/23 PFTs revealed FEV1/FVC 81%, FVC 4 28L 106% predicted, FEV1 3 4L 114% predicted, TLC 96% predicted, DLCO 101%     He was seen by Dr Brad Wells on 3/20/23 and is planned to receive carboplatic + pacxlitaxel + nivolumab for 3 cycles  On discussion he received his first chemotherapy treatment yesterday which he tolerated well  He is feeling more fatigued then prior  No SOB, cough, nausea/vomiting  He did recently have dental work on his left lower molar  The following portions of the patient's history were reviewed and updated as appropriate: allergies, current medications, past family history, past medical history, past social history, past surgical history and problem list     Review of Systems   Constitutional: Negative for chills, diaphoresis and unexpected weight change  HENT: Negative  Eyes: Negative  Respiratory: Negative for cough, shortness of breath and wheezing  Cardiovascular: Negative for chest pain and leg swelling  Gastrointestinal: Negative for abdominal pain, diarrhea and nausea  Endocrine: Negative  Genitourinary: Negative  Musculoskeletal: Negative  Skin: Negative  Allergic/Immunologic: Negative  Neurological: Negative  Hematological: Negative for adenopathy  Does not bruise/bleed easily  Psychiatric/Behavioral: Negative  All other systems reviewed and are negative  Objective:   Physical Exam  Vitals reviewed  Constitutional:       General: He is not in acute distress  Appearance: Normal appearance  He is not ill-appearing  HENT:      Head: Normocephalic  Nose: Nose normal       Mouth/Throat:      Mouth: Mucous membranes are moist    Eyes:      Pupils: Pupils are equal, round, and reactive to light  Cardiovascular:      Rate and Rhythm: Normal rate and regular rhythm  Heart sounds: Normal heart sounds  Pulmonary:      Effort: Pulmonary effort is normal       Breath sounds: Normal breath sounds  No wheezing, rhonchi or rales     Abdominal: "Palpations: Abdomen is soft  Musculoskeletal:         General: No swelling  Normal range of motion  Lymphadenopathy:      Cervical: No cervical adenopathy  Skin:     General: Skin is warm  Neurological:      General: No focal deficit present  Mental Status: He is alert and oriented to person, place, and time  Psychiatric:         Mood and Affect: Mood normal      /78 (BP Location: Left arm, Patient Position: Sitting, Cuff Size: Standard)   Pulse 83   Temp 98 7 °F (37 1 °C) (Temporal)   Resp 17   Ht 5' 10\" (1 778 m)   Wt 77 8 kg (171 lb 8 3 oz)   SpO2 96%   BMI 24 61 kg/m²     No Chest XR results available for this patient  No CT Chest results available for this patient  No CT Chest,Abdomen,Pelvis results available for this patient  NM PET CT skull base to mid thigh    Result Date: 3/16/2023  Narrative PET/CT SCAN INDICATION: New diagnosis of lung cancer  Initial staging  C34 92: Malignant neoplasm of unspecified part of left bronchus or lung C34 02: Malignant neoplasm of left main bronchus MODIFIER: PI COMPARISON: CT chest 1/30/2023 CELL TYPE:  Adenocarcinoma of the lung TECHNIQUE:   8 5 mCi F-18-FDG administered IV  Multiplanar attenuation corrected and non attenuation corrected PET images are available for interpretation, and contiguous, low dose, axial CT sections were obtained from the skull vertex through the femurs  Intravenous contrast material was not utilized  This examination, like all CT scans performed in the [facility], was performed utilizing techniques to minimize radiation dose exposure, including the use of iterative reconstruction and automated exposure control  Fasting serum glucose: 84 mg/dl FINDINGS: VISUALIZED BRAIN:   No acute abnormalities are seen  HEAD/NECK:   Focal radiotracer uptake at the left mandible, SUV max of 5 9  There is a focal lucency adjacent to a more on CT measuring up to 9 mm in size image 67 series 3   No FDG avid lymph " nodes  CT images: No additional findings  CHEST:   Mild radiotracer uptake related to the left lower lobe mass, SUV max of 2 2  This is a semi solid density on CT measures up to 4 6 cm in size, stable  No suspicious focal FDG uptake in the mediastinal or perihilar regions  CT images: Mild biapical nodularity better seen on prior chest CT  No suspicious FDG uptake here  ABDOMEN:   No FDG avid soft tissue lesions are seen  CT images: A few renal hypodensities likely cysts  Moderate fecal retention in the colon  Small fat-containing focal hernia  Colonic diverticulosis  PELVIS: No FDG avid soft tissue lesions are seen  CT images: 5 2 cm lipoma of the left gluteal musculature anteriorly  OSSEOUS STRUCTURES: No FDG avid lesions are seen  Focal uptake inferior to the right glenoid likely inflammatory such as related to arthritis given the location  No obvious findings on CT  CT images: No significant findings  Impression 1  The left lower lobe mass demonstrates mild FDG uptake compatible with the known malignancy  The low level of uptake would favor a low-grade malignancy  2  No findings for hypermetabolic metastasis  3   Focal radiotracer uptake at the left mandible adjacent to a mandibular molar with corresponding lucent lesion on CT  Findings suggest inflammatory periodontal disease  Correlate with dental exam  Workstation performed: [id number]      No Barium Swallow results available for this patient

## 2023-03-31 ENCOUNTER — TELEPHONE (OUTPATIENT)
Dept: HEMATOLOGY ONCOLOGY | Facility: MEDICAL CENTER | Age: 73
End: 2023-03-31

## 2023-03-31 ENCOUNTER — PATIENT OUTREACH (OUTPATIENT)
Dept: HEMATOLOGY ONCOLOGY | Facility: CLINIC | Age: 73
End: 2023-03-31

## 2023-03-31 NOTE — PROGRESS NOTES
Biopsychosocial and Barriers Assessment    Cancer Diagnosis: Lung Cancer  Home/Cell Phone: [phone]  Emergency Contact: WifeGen Arkdale  Marital Status:   Interpretation concerns, speaks another language, preferred language: English  Cultural concerns: None reported  Ability to read or write:  Fully Literate    Caregiver/Support: Wife, children, grandchildren, in laws  Children: son and daughter, both live on the same street  Child/Elder care: No    Housing: Own home  Home Setup:   Lives With: wife  Daily Living Activities: Pt is able to care for himself  Durable Medical Equipment: None   Ambulation: Fully Ambulatory    Preferred Pharmacy: [facility]  High co-pays with insurance: No  High co-pays with medication coverage: No  No medication coverage: Pt has a prescription plan through his [address] [place] [address] Provider:  Idris Miller DO  Hx of [address]: No  Hx of Short term rehab: No  Mental Health Hx: No  Substance Abuse Hx: No  Employment: retired   Status/Location: No  Ability to pay bills: Yes  POA/LW/AD: Yes  Transportation Plan/Concerns: Pt drives       What do you know about your Cancer Diagnosis    What has your doctor told you about your cancer diagnosis: Stage 2 Lung cancer    What has your doctor told you about your cancer treatment: I will have 3 chemo treatments and then surgery  Cancer is in my left lung  What specific concerns do you have about your diagnosis and treatment: No concerns    Have you been made aware of any hair loss associated with treatment:  N/A    Additional Comments:    MSW made outreach to the pt to introduce self and the role of the OSW  Contact information was provided  The Distress Thermometer was completed and the pt self scored 0  Pt reports no financial concerns or having any issues with his insurance  The pt lives at home with his wife and his son lives next door    Pt's daughter lives down the street and his wife's family is also local   Pt described his family as involved and supportive  Pt has no OSW needs at this time  He is looking forward to going on a cruise when his treatment is completed  Support was offered and pt was encouraged to call as needed  Pt does not require any ongoing follow up

## 2023-03-31 NOTE — TELEPHONE ENCOUNTER
"Received call from patient c/o \"hardended' stools  Patient will introduce colace and call if ineffective  "

## 2023-04-03 ENCOUNTER — PATIENT OUTREACH (OUTPATIENT)
Dept: HEMATOLOGY ONCOLOGY | Facility: CLINIC | Age: 73
End: 2023-04-03

## 2023-04-03 NOTE — PROGRESS NOTES
Subsequent Outreach: Called and spoke to patient  Reviewed upcoming appointments including date, time and location  Assessed for barriers of care  Denies symptoms of chest pain, shortness of breath, or cough  Appetite is good  Denies nausea or vomiting  Denies constipation or diarrhea  He does c/o low leg pain for a couple days after treatment and has subsided  He started his first cycle of three on 3/28 and stated, he tolerated fairly well  He recently had a f/u with Thoracic surgery Dr Tiki Sage on 3/29  Plans are to repeat a PET/ CT two weeks after completion of chemotherapy/immunotherapy, Scheduled on 5/23  As long as he has an adequate response, they would offer him a robotic left lower lobectomy at that time  He has a follow up scheduled with thoracic on 5/24 after PET/ CT  We mutually agreed for me to f/u with him in about two weeks or so after his second cycle  Otherwise, he has my direct phone number for questions or concerns  Patient was appreciative

## 2023-04-06 ENCOUNTER — OFFICE VISIT (OUTPATIENT)
Dept: HEMATOLOGY ONCOLOGY | Facility: CLINIC | Age: 73
End: 2023-04-06

## 2023-04-06 VITALS
HEIGHT: 70 IN | RESPIRATION RATE: 18 BRPM | DIASTOLIC BLOOD PRESSURE: 68 MMHG | HEART RATE: 61 BPM | BODY MASS INDEX: 24.26 KG/M2 | OXYGEN SATURATION: 98 % | TEMPERATURE: 98.1 F | SYSTOLIC BLOOD PRESSURE: 116 MMHG | WEIGHT: 169.5 LBS

## 2023-04-06 DIAGNOSIS — C34.92 ADENOCARCINOMA, LUNG, LEFT (HCC): Primary | ICD-10-CM
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
## 2023-04-18 ENCOUNTER — HOSPITAL ENCOUNTER (OUTPATIENT)
Dept: INFUSION CENTER | Facility: HOSPITAL | Age: 73
Discharge: HOME | End: 2023-04-18
Attending: INTERNAL MEDICINE

## 2023-04-18 VITALS
HEIGHT: 70 IN | DIASTOLIC BLOOD PRESSURE: 71 MMHG | TEMPERATURE: 96.9 F | RESPIRATION RATE: 16 BRPM | WEIGHT: 174.16 LBS | HEART RATE: 58 BPM | SYSTOLIC BLOOD PRESSURE: 127 MMHG | BODY MASS INDEX: 24.93 KG/M2 | OXYGEN SATURATION: 97 %

## 2023-04-18 DIAGNOSIS — C34.92 ADENOCARCINOMA, LUNG, LEFT (HCC): Primary | ICD-10-CM

## 2023-04-18 RX ORDER — PALONOSETRON 0.05 MG/ML
0.25 INJECTION, SOLUTION INTRAVENOUS ONCE
Status: COMPLETED | OUTPATIENT
Start: 2023-04-18 | End: 2023-04-18

## 2023-04-18 RX ORDER — SODIUM CHLORIDE 9 MG/ML
20 INJECTION, SOLUTION INTRAVENOUS ONCE
Status: COMPLETED | OUTPATIENT
Start: 2023-04-18 | End: 2023-04-18

## 2023-04-18 RX ADMIN — PALONOSETRON HYDROCHLORIDE 0.25 MG: 0.05 INJECTION, SOLUTION INTRAVENOUS at 10:02

## 2023-04-18 RX ADMIN — SODIUM CHLORIDE 20 ML/HR: 0.9 INJECTION, SOLUTION INTRAVENOUS at 08:40

## 2023-04-18 RX ADMIN — DIPHENHYDRAMINE HYDROCHLORIDE 25 MG: 50 INJECTION INTRAMUSCULAR; INTRAVENOUS at 09:04

## 2023-04-18 RX ADMIN — CARBOPLATIN 532 MG: 10 INJECTION, SOLUTION INTRAVENOUS at 14:55

## 2023-04-18 RX ADMIN — SODIUM CHLORIDE 360 MG: 9 INJECTION, SOLUTION INTRAVENOUS at 11:07

## 2023-04-18 RX ADMIN — PACLITAXEL 336 MG: 6 INJECTION, SOLUTION, CONCENTRATE INTRAVENOUS at 11:44

## 2023-04-18 RX ADMIN — DEXAMETHASONE SODIUM PHOSPHATE 20 MG: 10 INJECTION, SOLUTION INTRAMUSCULAR; INTRAVENOUS at 08:43

## 2023-04-18 RX ADMIN — FAMOTIDINE 20 MG: 10 INJECTION INTRAVENOUS at 09:40

## 2023-04-18 RX ADMIN — FOSAPREPITANT 150 MG: 150 INJECTION, POWDER, LYOPHILIZED, FOR SOLUTION INTRAVENOUS at 10:08

## 2023-04-18 NOTE — PROGRESS NOTES
Chemo given without incident, No S/S of adverse reaction  Patient offers no complaints on D/C  AVS given

## 2023-04-21 ENCOUNTER — APPOINTMENT (EMERGENCY)
Dept: CT IMAGING | Facility: HOSPITAL | Age: 73
End: 2023-04-21

## 2023-04-21 ENCOUNTER — HOSPITAL ENCOUNTER (EMERGENCY)
Facility: HOSPITAL | Age: 73
Discharge: HOME | End: 2023-04-21
Attending: EMERGENCY MEDICINE

## 2023-04-21 VITALS
RESPIRATION RATE: 18 BRPM | DIASTOLIC BLOOD PRESSURE: 68 MMHG | HEART RATE: 76 BPM | OXYGEN SATURATION: 96 % | TEMPERATURE: 98.2 F | SYSTOLIC BLOOD PRESSURE: 121 MMHG

## 2023-04-21 DIAGNOSIS — K63.89 PROCTOSIGMOIDITIS: ICD-10-CM

## 2023-04-21 DIAGNOSIS — K62.5 RECTAL BLEEDING: Primary | ICD-10-CM

## 2023-04-21 DIAGNOSIS — R10.9 ABDOMINAL PAIN: ICD-10-CM

## 2023-04-21 LAB
ALBUMIN SERPL BCP-MCNC: 4.1 G/DL (ref 3.5–5)
ALP SERPL-CCNC: 47 U/L (ref 34–104)
ALT SERPL W P-5'-P-CCNC: 22 U/L (ref 7–52)
ANION GAP SERPL CALCULATED.3IONS-SCNC: 7 MMOL/L (ref 4–13)
AST SERPL W P-5'-P-CCNC: 20 U/L (ref 13–39)
BASOPHILS # BLD AUTO: 0 THOUSANDS/ΜL (ref 0–0.1)
BASOPHILS NFR BLD AUTO: 0 % (ref 0–1)
BILIRUB SERPL-MCNC: 0.69 MG/DL (ref 0.2–1)
BILIRUB UR QL STRIP: NEGATIVE
BUN SERPL-MCNC: 24 MG/DL (ref 5–25)
CALCIUM SERPL-MCNC: 9.1 MG/DL (ref 8.4–10.2)
CHLORIDE SERPL-SCNC: 105 MMOL/L (ref 96–108)
CLARITY UR: CLEAR
CO2 SERPL-SCNC: 26 MMOL/L (ref 21–32)
COLOR UR: YELLOW
CREAT SERPL-MCNC: 0.84 MG/DL (ref 0.6–1.3)
EOSINOPHIL # BLD AUTO: 0.02 THOUSAND/ΜL (ref 0–0.61)
EOSINOPHIL NFR BLD AUTO: 0 % (ref 0–6)
ERYTHROCYTE [DISTWIDTH] IN BLOOD BY AUTOMATED COUNT: 12.7 % (ref 11.6–15.1)
GFR SERPL CREATININE-BSD FRML MDRD: 87 ML/MIN/1.73SQ M
GLUCOSE SERPL-MCNC: 103 MG/DL (ref 65–140)
GLUCOSE UR STRIP-MCNC: NEGATIVE MG/DL
HCT VFR BLD AUTO: 43.3 % (ref 36.5–49.3)
HGB BLD-MCNC: 14.2 G/DL (ref 12–17)
HGB UR QL STRIP.AUTO: NEGATIVE
IMM GRANULOCYTES # BLD AUTO: 0.02 THOUSAND/UL (ref 0–0.2)
IMM GRANULOCYTES NFR BLD AUTO: 0 % (ref 0–2)
KETONES UR STRIP-MCNC: NEGATIVE MG/DL
LEUKOCYTE ESTERASE UR QL STRIP: NEGATIVE
LIPASE SERPL-CCNC: 28 U/L (ref 11–82)
LYMPHOCYTES # BLD AUTO: 1.75 THOUSANDS/ΜL (ref 0.6–4.47)
LYMPHOCYTES NFR BLD AUTO: 23 % (ref 14–44)
MCH RBC QN AUTO: 31.4 PG (ref 26.8–34.3)
MCHC RBC AUTO-ENTMCNC: 32.8 G/DL (ref 31.4–37.4)
MCV RBC AUTO: 96 FL (ref 82–98)
MONOCYTES # BLD AUTO: 0.22 THOUSAND/ΜL (ref 0.17–1.22)
MONOCYTES NFR BLD AUTO: 3 % (ref 4–12)
NEUTROPHILS # BLD AUTO: 5.62 THOUSANDS/ΜL (ref 1.85–7.62)
NEUTS SEG NFR BLD AUTO: 74 % (ref 43–75)
NITRITE UR QL STRIP: NEGATIVE
NRBC BLD AUTO-RTO: 0 /100 WBCS
PH UR STRIP.AUTO: 6 [PH]
PLATELET # BLD AUTO: 143 THOUSANDS/UL (ref 149–390)
PMV BLD AUTO: 9.7 FL (ref 8.9–12.7)
POTASSIUM SERPL-SCNC: 4.3 MMOL/L (ref 3.5–5.3)
PROT SERPL-MCNC: 6.9 G/DL (ref 6.4–8.4)
PROT UR STRIP-MCNC: NEGATIVE MG/DL
RBC # BLD AUTO: 4.52 MILLION/UL (ref 3.88–5.62)
SODIUM SERPL-SCNC: 138 MMOL/L (ref 135–147)
SP GR UR STRIP.AUTO: 1.02
UROBILINOGEN UR QL STRIP.AUTO: 0.2 E.U./DL
WBC # BLD AUTO: 7.63 THOUSAND/UL (ref 4.31–10.16)

## 2023-04-21 RX ADMIN — IOHEXOL 100 ML: 350 INJECTION, SOLUTION INTRAVENOUS at 06:21

## 2023-04-21 NOTE — ED NOTES
Patient ambulating to bathroom at this time, steady gait noted     Aislinn Feliciano RN  04/21/23 0882

## 2023-04-21 NOTE — ED PROVIDER NOTES
History  Chief Complaint   Patient presents with   • Rectal Bleeding     Pt reports waking up with lower abd pain and went to use restroom, noticed soft stools with a small amount of red blood in the bowl  Pt states he is actively getting chemo for his lung cancer     75-year-old male with non-small cell lung cancer currently receiving neoadjuvant chemotherapy every 21 days who presents to the emergency department for evaluation of lower abdominal pain and bright red blood per rectum  Patient states he had his second dose of chemotherapy on Tuesday and was doing well  Earlier today he developed some lower abdominal pain and distention associated with a few bloody bowel movements  He denies any associated nausea or vomiting  No constipation  No fevers or chills  No chest pain, shortness of breath, or cough  Patient is not on any blood thinners or antiplatelet medications  He states he has had colonoscopies in the past which had been normal           Prior to Admission Medications   Prescriptions Last Dose Informant Patient Reported? Taking?    Sodium Fluoride 5000 PPM 1 1 % GEL   Yes No   Sig: Use as directed   ondansetron (ZOFRAN) 8 mg tablet   No No   Sig: Take 1 tablet (8 mg total) by mouth every 8 (eight) hours as needed for nausea or vomiting      Facility-Administered Medications: None       Past Medical History:   Diagnosis Date   • BPH with obstruction/lower urinary tract symptoms    • Cystitis, chronic    • Epididymo-orchitis    • Feeling of incomplete bladder emptying    • Frequency of micturition    • Nocturia    • Other microscopic hematuria    • Poor urinary stream    • Simple renal cyst        Past Surgical History:   Procedure Laterality Date   • CYSTOSCOPY  2012   • IR BIOPSY LUNG  2/23/2023       Family History   Problem Relation Age of Onset   • Diabetes Mother    • Hypertension Mother    • No Known Problems Father      I have reviewed and agree with the history as documented  E-Cigarette/Vaping     E-Cigarette/Vaping Substances     Social History     Tobacco Use   • Smoking status: Some Days     Packs/day: 1 00     Years: 60 00     Pack years: 60 00     Types: Cigarettes   • Smokeless tobacco: Never   • Tobacco comments:     1/25/23 Patient noted smoking that equates to 60-80 pack years, exact pack per day amount varies over time   Substance Use Topics   • Alcohol use: Never   • Drug use: Never       Review of Systems   Constitutional: Negative for chills and fever  HENT: Negative for ear pain and sore throat  Eyes: Negative for pain and visual disturbance  Respiratory: Negative for cough and shortness of breath  Cardiovascular: Negative for chest pain and palpitations  Gastrointestinal: Positive for abdominal pain and anal bleeding  Negative for nausea and vomiting  Genitourinary: Negative for dysuria and hematuria  Musculoskeletal: Negative for arthralgias and back pain  Skin: Negative for color change and rash  Neurological: Negative for seizures and syncope  All other systems reviewed and are negative  Physical Exam  Physical Exam  Vitals and nursing note reviewed  Exam conducted with a chaperone present (Sheila Fuentes RN)  Constitutional:       General: He is not in acute distress  Appearance: He is well-developed  HENT:      Head: Normocephalic and atraumatic  Right Ear: External ear normal       Left Ear: External ear normal       Nose: Nose normal       Mouth/Throat:      Mouth: Mucous membranes are moist    Eyes:      General: No scleral icterus  Extraocular Movements: Extraocular movements intact  Conjunctiva/sclera: Conjunctivae normal       Pupils: Pupils are equal, round, and reactive to light  Cardiovascular:      Rate and Rhythm: Normal rate and regular rhythm  Pulses: Normal pulses  Heart sounds: No murmur heard  Pulmonary:      Effort: Pulmonary effort is normal  No respiratory distress  Breath sounds: Normal breath sounds  Abdominal:      General: Abdomen is flat  Bowel sounds are normal       Palpations: Abdomen is soft  Tenderness: There is abdominal tenderness  There is no guarding or rebound  Genitourinary:     Comments: No anal fissures or hemorrhoids noted, no brisk bleeding  Musculoskeletal:         General: No deformity  Normal range of motion  Cervical back: Normal range of motion and neck supple  Skin:     General: Skin is warm and dry  Capillary Refill: Capillary refill takes less than 2 seconds  Neurological:      General: No focal deficit present  Mental Status: He is alert and oriented to person, place, and time  Psychiatric:         Mood and Affect: Mood normal          Behavior: Behavior normal          Vital Signs  ED Triage Vitals [04/21/23 0531]   Temperature Pulse Respirations Blood Pressure SpO2   98 2 °F (36 8 °C) 72 18 124/77 93 %      Temp Source Heart Rate Source Patient Position - Orthostatic VS BP Location FiO2 (%)   Temporal Monitor Lying Left arm --      Pain Score       5           Vitals:    04/21/23 0531   BP: 124/77   Pulse: 72   Patient Position - Orthostatic VS: Lying         Visual Acuity      ED Medications  Medications   iohexol (OMNIPAQUE) 350 MG/ML injection (SINGLE-DOSE) 100 mL (100 mL Intravenous Given 4/21/23 6978)       Diagnostic Studies  Results Reviewed     Procedure Component Value Units Date/Time    UA (URINE) with reflex to Scope [[id number]] Collected: 04/21/23 0643    Lab Status:  In process Specimen: Urine, Clean Catch Updated: 04/21/23 0647    Comprehensive metabolic panel [[id number]] Collected: 04/21/23 0540    Lab Status: Final result Specimen: Blood from Arm, Right Updated: 04/21/23 0601     Sodium 138 mmol/L      Potassium 4 3 mmol/L      Chloride 105 mmol/L      CO2 26 mmol/L      ANION GAP 7 mmol/L      BUN 24 mg/dL      Creatinine 0 84 mg/dL      Glucose 103 mg/dL      Calcium 9 1 mg/dL      AST 20 U/L      ALT 22 U/L      Alkaline Phosphatase 47 U/L      Total Protein 6 9 g/dL      Albumin 4 1 g/dL      Total Bilirubin 0 69 mg/dL      eGFR 87 ml/min/1 73sq m     Narrative:      National Kidney Disease Foundation guidelines for Chronic Kidney Disease (CKD):   •  Stage 1 with normal or high GFR (GFR > 90 mL/min/1 73 square meters)  •  Stage 2 Mild CKD (GFR = 60-89 mL/min/1 73 square meters)  •  Stage 3A Moderate CKD (GFR = 45-59 mL/min/1 73 square meters)  •  Stage 3B Moderate CKD (GFR = 30-44 mL/min/1 73 square meters)  •  Stage 4 Severe CKD (GFR = 15-29 mL/min/1 73 square meters)  •  Stage 5 End Stage CKD (GFR <15 mL/min/1 73 square meters)  Note: GFR calculation is accurate only with a steady state creatinine    Lipase [[id number]]  (Normal) Collected: 04/21/23 0540    Lab Status: Final result Specimen: Blood from Arm, Right Updated: 04/21/23 0601     Lipase 28 u/L     CBC and differential [[id number]]  (Abnormal) Collected: 04/21/23 0540    Lab Status: Final result Specimen: Blood from Arm, Right Updated: 04/21/23 0545     WBC 7 63 Thousand/uL      RBC 4 52 Million/uL      Hemoglobin 14 2 g/dL      Hematocrit 43 3 %      MCV 96 fL      MCH 31 4 pg      MCHC 32 8 g/dL      RDW 12 7 %      MPV 9 7 fL      Platelets 015 Thousands/uL      nRBC 0 /100 WBCs      Neutrophils Relative 74 %      Immat GRANS % 0 %      Lymphocytes Relative 23 %      Monocytes Relative 3 %      Eosinophils Relative 0 %      Basophils Relative 0 %      Neutrophils Absolute 5 62 Thousands/µL      Immature Grans Absolute 0 02 Thousand/uL      Lymphocytes Absolute 1 75 Thousands/µL      Monocytes Absolute 0 22 Thousand/µL      Eosinophils Absolute 0 02 Thousand/µL      Basophils Absolute 0 00 Thousands/µL                  CT abdomen pelvis with contrast    (Results Pending)              Procedures  Procedures         ED Course  ED Course as of 04/21/23 0649   Fri Apr 21, 2023   0554 Hemoglobin: 14 2         CRAFFT    Flowsheet Row Most Recent Value "  CRAFFT Initial Screen: During the past 12 months, did you:    1  Drink any alcohol (more than a few sips)? No Filed at: 04/21/2023 0644   2  Smoke any marijuana or hashish No Filed at: 04/21/2023 0644   3  Use anything else to get high? (\"anything else\" includes illegal drugs, over the counter and prescription drugs, and things that you sniff or 'hartmann')? No Filed at: 04/21/2023 9995                            SBIRT 22yo+    Flowsheet Row Most Recent Value   Initial Alcohol Screen: US AUDIT-C     1  How often do you have a drink containing alcohol? 0 Filed at: 04/21/2023 0644   2  How many drinks containing alcohol do you have on a typical day you are drinking? 0 Filed at: 04/21/2023 0644   3a  Male UNDER 65: How often do you have five or more drinks on one occasion? 0 Filed at: 04/21/2023 0644   3b  FEMALE Any Age, or MALE 65+: How often do you have 4 or more drinks on one occassion? 0 Filed at: 04/21/2023 0644   Audit-C Score 0 Filed at: 04/21/2023 0499   HILARIA: How many times in the past year have you    Used an illegal drug or used a prescription medication for non-medical reasons? Never Filed at: 04/21/2023 3073                    Medical Decision Making  63-year-old male with lung cancer currently receiving chemotherapy presents to the emergency department for evaluation of abdominal pain and rectal bleeding  On exam, he is resting comfortably in bed in no acute distress  Does have some mild lower abdominal tenderness without rebound or guarding  Differential diagnosis includes but is not limited to colitis, diverticulitis, diverticulosis, viral illness, urinary tract infection, hemorrhoids, anal fissure, polyps, analrectal cancer  We will check abdominal labs, urinalysis, and CT scan of the abdomen and pelvis  Patient's labs are grossly unremarkable  Patient signed out pending CT results       Abdominal pain: acute illness or injury  Rectal bleeding: acute illness or injury  Amount and/or " Complexity of Data Reviewed  Labs: ordered  Decision-making details documented in ED Course  Radiology: ordered  Risk  Prescription drug management  Disposition  Final diagnoses:   Rectal bleeding   Abdominal pain     Time reflects when diagnosis was documented in both MDM as applicable and the Disposition within this note     Time User Action Codes Description Comment    4/21/2023  6:49 AM Check, Luis Trammell [K62 5] Rectal bleeding     4/21/2023  6:49 AM Luis Anderson [R10 9] Abdominal pain       ED Disposition     None      Follow-up Information    None         Patient's Medications   Discharge Prescriptions    No medications on file       No discharge procedures on file      PDMP Review     None          ED Provider  Electronically Signed by           Melissa Alfonso MD  04/21/23 4767

## 2023-04-21 NOTE — ED CARE HANDOFF
Emergency Department Sign Out Note        Sign out and transfer of care from Dr Jalen Robles  See Separate Emergency Department note  The patient, Chastity Hoff, was evaluated by the previous provider for rectal bleeding and pain  Workup Completed:  Blood work    ED Course / Workup Pending (followup):  CT scan and re-evaluation                                  ED Course as of 04/21/23 0932   Fri Apr 21, 2023   0659 Lung cancer on chemo  Rectal pain and bleeding  No BRBPR on exam by overnight physician  CT scan  No thinners  Hemodynamically stable  If CT neg go home  8194 Discussed case with GI and would not plan for scoping however if patient symptomatic and continued to have large-volume bright red blood per rectum state is not unreasonable for admission  Discussed admission with patient and patient stating that his most recent bowel movement although still having some blood was improved and that he is continuing to have no symptoms and no episodes of dizziness or lightheadedness or weakness  Discussed and reviewed all blood work including stable hemoglobin along with stable vitals  Patient and patient's wife state they would prefer to go home at this time and continue to monitor at home  Strict return precautions discussed with both patient and wife and recommend immediate follow-up with oncologist and GI  Patient states his understanding of admission versus discharge and again states he would prefer to go home at this time as he feels okay       Procedures  MDM        Disposition  Final diagnoses:   Rectal bleeding   Abdominal pain   Proctosigmoiditis     Time reflects when diagnosis was documented in both MDM as applicable and the Disposition within this note     Time User Action Codes Description Comment    4/21/2023  6:49 AM CheckSolomon Add [K62 5] Rectal bleeding     4/21/2023  6:49 AM Solomon Anderson Add [R10 9] Abdominal pain     4/21/2023  9:30 AM Yanick Perry Add [K63 89] Proctosigmoiditis       ED Disposition     ED Disposition   Discharge    Condition   Stable    Date/Time   Fri Apr 21, 2023  9:30 AM    Comment   Dontae Meka discharge to home/self care                 Follow-up Information     Follow up With Specialties Details Why Contact Info Additional [address] Emergency Department Emergency Medicine  If symptoms worsen [address]  [place] [address]  [phone] Critical access hospital Emergency Department, [address] [facility], [address] [place] [address] [facility] [place] [facility] Schedule an appointment as soon as possible for a visit   [address] [address]  [address] [place] [address] [facility] [place], [address] [facility], [facility] [place], [place], [address], Desean Whalen, PAAzaelC Physician Assistant, Gastroenterology   [address] [place] [address]  [phone]           Patient's Medications   Discharge Prescriptions    No medications on file            ED Provider  Electronically Signed by     Raji Cohn DO  04/21/23 6966

## 2023-04-24 ENCOUNTER — TELEPHONE (OUTPATIENT)
Dept: GASTROENTEROLOGY | Facility: AMBULARY SURGERY CENTER | Age: 73
End: 2023-04-24

## 2023-04-25 ENCOUNTER — OFFICE VISIT (OUTPATIENT)
Dept: HEMATOLOGY ONCOLOGY | Facility: CLINIC | Age: 73
End: 2023-04-25

## 2023-04-25 VITALS
BODY MASS INDEX: 24.62 KG/M2 | HEIGHT: 70 IN | RESPIRATION RATE: 18 BRPM | DIASTOLIC BLOOD PRESSURE: 82 MMHG | OXYGEN SATURATION: 95 % | TEMPERATURE: 97.7 F | SYSTOLIC BLOOD PRESSURE: 118 MMHG | WEIGHT: 172 LBS | HEART RATE: 64 BPM

## 2023-04-25 DIAGNOSIS — K62.5 RECTAL BLEEDING: ICD-10-CM

## 2023-04-25 DIAGNOSIS — D69.6 PLATELETS DECREASED (HCC): ICD-10-CM

## 2023-04-25 DIAGNOSIS — C34.92 ADENOCARCINOMA, LUNG, LEFT (HCC): Primary | ICD-10-CM
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
## 2023-04-26 ENCOUNTER — OFFICE VISIT (OUTPATIENT)
Dept: GASTROENTEROLOGY | Facility: CLINIC | Age: 73
End: 2023-04-26

## 2023-04-26 VITALS
HEART RATE: 62 BPM | RESPIRATION RATE: 18 BRPM | OXYGEN SATURATION: 99 % | SYSTOLIC BLOOD PRESSURE: 112 MMHG | WEIGHT: 173 LBS | BODY MASS INDEX: 24.77 KG/M2 | DIASTOLIC BLOOD PRESSURE: 78 MMHG | HEIGHT: 70 IN

## 2023-04-26 DIAGNOSIS — C34.92 ADENOCARCINOMA, LUNG, LEFT (HCC): ICD-10-CM

## 2023-04-26 DIAGNOSIS — F17.201 TOBACCO USE DISORDER, MODERATE, IN EARLY REMISSION: ICD-10-CM

## 2023-04-26 DIAGNOSIS — D69.6 PLATELETS DECREASED (HCC): ICD-10-CM

## 2023-04-26 DIAGNOSIS — K59.03 DRUG-INDUCED CONSTIPATION: ICD-10-CM

## 2023-04-26 DIAGNOSIS — K62.5 RECTAL BLEEDING: Primary | ICD-10-CM

## 2023-04-26 RX ORDER — POLYETHYLENE GLYCOL 3350, SODIUM SULFATE ANHYDROUS, SODIUM BICARBONATE, SODIUM CHLORIDE, POTASSIUM CHLORIDE 236; 22.74; 6.74; 5.86; 2.97 G/4L; G/4L; G/4L; G/4L; G/4L
4000 POWDER, FOR SOLUTION ORAL ONCE
Qty: 4000 ML | Refills: 0 | Status: SHIPPED | OUTPATIENT
Start: 2023-04-26 | End: 2023-05-05 | Stop reason: ALTCHOICE

## 2023-04-26 NOTE — PATIENT INSTRUCTIONS
Scheduled date of colonoscopy (as of today):05/05/2023  Physician performing colonoscopy:Norma  Location of colonoscopy:[place]  Bowel prep reviewed with patient:Lior  Instructions reviewed with patient by:Mica  Clearances: none

## 2023-04-26 NOTE — PROGRESS NOTES
[facility] - Outpatient Consultation  Shaniqua Euceda 67 y o  male MRN: [id number]  Encounter: [id number]          ASSESSMENT AND PLAN:    66-year-old male with non-small cell lung cancer on neoadjuvant paclitaxel, carboplatin, nivolumab with plan for eventual resection here for several days of painless rectal bleeding  Strong suspicion for anorectal etiology such as hemorrhoids or fissure  CT did show possible left sided colitis and nivolumab can be associated with inflammatory colitis however he has no GI symptoms to suggest this  Will pursue expedited colonoscopy to ensure no colitis or distal colon lesion given his expected on going chemotherapy and eventual lung resection  1  Rectal bleeding  - Colonoscopy; Future  - polyethylene glycol (Golytely) 4000 mL solution; Take 4,000 mL by mouth once for 1 dose Take 4000 mL by mouth once for 1 dose  Use as directed  Dispense: 4000 mL; Refill: 0    2  Adenocarcinoma, lung, left ([facility] [address] )  Expedited colonoscopy as above to ensure no CPI-induced colitis     3  Platelets decreased ([facility] [address] )  Typically in the 140s  No evidence of portal hypertension on CT    4  Drug-induced constipation  Ok to continue colace  Encouraged patient to use miralax if constipation worsens    5  Tobacco use disorder, moderate, in early remission  Applauded that he quit following his lung cancer diagnosis    ______________________________________________________________________    HPI:    Has never had GI issues before starting chemo  Getting very constipated  Last Friday, saw a lot of blood on the toilet paper  Had some drippage of blood with any increased abdominal pressure  Lasted a few days and then resolved  No blood mixed in with stools  No longer seeing any bleeding  No abdominal pain  Has 2 BM per day  No diarrhea  Taking colace which helps with constipation tremendously      Last colonoscopy maybe 5-6 years ago, has never had any abnormalities on colonoscopy  Reviwed 4/21/23 ED documentation  HgB normal  CT with IV contrast demonstrated proctosigmoiditis  Rectal exam in ED with no external hemorrhoids or fissures  REVIEW OF SYSTEMS:    CONSTITUTIONAL: Denies any fever, chills, rigors, and weight loss  HEENT: No earache or tinnitus  Denies hearing loss or visual disturbances  CARDIOVASCULAR: No chest pain or palpitations  RESPIRATORY: Denies any cough, hemoptysis, shortness of breath or dyspnea on exertion  GASTROINTESTINAL: As noted in the History of Present Illness  GENITOURINARY: No problems with urination  Denies any hematuria or dysuria  NEUROLOGIC: No dizziness or vertigo, denies headaches  MUSCULOSKELETAL: Denies any muscle or joint pain  SKIN: Denies skin rashes or itching  ENDOCRINE: Denies excessive thirst  Denies intolerance to heat or cold  PSYCHOSOCIAL: Denies depression or anxiety  Denies any recent memory loss         Historical Information   Past Medical History:   Diagnosis Date   • BPH with obstruction/lower urinary tract symptoms    • Cystitis, chronic    • Epididymo-orchitis    • Feeling of incomplete bladder emptying    • Frequency of micturition    • Nocturia    • Other microscopic hematuria    • Poor urinary stream    • Simple renal cyst      Past Surgical History:   Procedure Laterality Date   • CYSTOSCOPY  2012   • IR BIOPSY LUNG  2/23/2023     Social History   Social History     Substance and Sexual Activity   Alcohol Use Never     Social History     Substance and Sexual Activity   Drug Use Never     Social History     Tobacco Use   Smoking Status Some Days   • Packs/day: 1 00   • Years: 60 00   • Pack years: 60 00   • Types: Cigarettes   Smokeless Tobacco Never   Tobacco Comments    1/25/23 Patient noted smoking that equates to 60-80 pack years, exact pack per day amount varies over time     Family History   Problem Relation Age of Onset   • Diabetes Mother    • Hypertension Mother    • No Known Problems "Father        Meds/Allergies       Current Outpatient Medications:   •  ondansetron (ZOFRAN) 8 mg tablet  •  Sodium Fluoride 5000 PPM 1 1 % GEL    No Known Allergies        Objective     Blood pressure 112/78, pulse 62, resp  rate 18, height 5' 10\" (1 778 m), weight 78 5 kg (173 lb), SpO2 99 %  Body mass index is 24 82 kg/m²  PHYSICAL EXAM:      General Appearance:   Alert, cooperative, no distress   HEENT:   Normocephalic, atraumatic, anicteric  Neck:  Supple, symmetrical, trachea midline   Lungs:   Clear to auscultation bilaterally; no rales, rhonchi or wheezing; respirations unlabored    Heart[de-identified]   Regular rate and rhythm; no murmur, rub, or gallop  Abdomen:   Soft, non-tender, non-distended; normal bowel sounds; no masses, no organomegaly    Genitalia:   Deferred    Rectal:   Deferred    Extremities:  No cyanosis, clubbing or edema    Pulses:  2+ and symmetric    Skin:  No jaundice, rashes, or lesions    Lymph nodes:  No palpable cervical lymphadenopathy        Lab Results:   No visits with results within 1 Day(s) from this visit     Latest known visit with results is:   Admission on 04/21/2023, Discharged on 04/21/2023   Component Date Value   • WBC 04/21/2023 7 63    • RBC 04/21/2023 4 52    • Hemoglobin 04/21/2023 14 2    • Hematocrit 04/21/2023 43 3    • MCV 04/21/2023 96    • MCH 04/21/2023 31 4    • MCHC 04/21/2023 32 8    • RDW 04/21/2023 12 7    • MPV 04/21/2023 9 7    • Platelets 10/75/2650 143 (L)    • nRBC 04/21/2023 0    • Neutrophils Relative 04/21/2023 74    • Immat GRANS % 04/21/2023 0    • Lymphocytes Relative 04/21/2023 23    • Monocytes Relative 04/21/2023 3 (L)    • Eosinophils Relative 04/21/2023 0    • Basophils Relative 04/21/2023 0    • Neutrophils Absolute 04/21/2023 5 62    • Immature Grans Absolute 04/21/2023 0 02    • Lymphocytes Absolute 04/21/2023 1 75    • Monocytes Absolute 04/21/2023 0 22    • Eosinophils Absolute 04/21/2023 0 02    • Basophils Absolute 04/21/2023 0 00  " • Sodium 04/21/2023 138    • Potassium 04/21/2023 4 3    • Chloride 04/21/2023 105    • CO2 04/21/2023 26    • ANION GAP 04/21/2023 7    • BUN 04/21/2023 24    • Creatinine 04/21/2023 0 84    • Glucose 04/21/2023 103    • Calcium 04/21/2023 9 1    • AST 04/21/2023 20    • ALT 04/21/2023 22    • Alkaline Phosphatase 04/21/2023 47    • Total Protein 04/21/2023 6 9    • Albumin 04/21/2023 4 1    • Total Bilirubin 04/21/2023 0 69    • eGFR 04/21/2023 87    • Lipase 04/21/2023 28    • Color, UA 04/21/2023 Yellow    • Clarity, UA 04/21/2023 Clear    • Specific Gravity, UA 04/21/2023 1 025    • pH, UA 04/21/2023 6 0    • Leukocytes, UA 04/21/2023 Negative    • Nitrite, UA 04/21/2023 Negative    • Protein, UA 04/21/2023 Negative    • Glucose, UA 04/21/2023 Negative    • Ketones, UA 04/21/2023 Negative    • Urobilinogen, UA 04/21/2023 0 2    • Bilirubin, UA 04/21/2023 Negative    • Occult Blood, UA 04/21/2023 Negative          Radiology Results:   CT abdomen pelvis with contrast    Result Date: 4/21/2023  Narrative: CT ABDOMEN AND PELVIS WITH IV CONTRAST INDICATION:   LLQ abdominal pain Abdominal pain, acute, nonlocalized Lower abdominal pain  COMPARISON:  CT PET 3/16/2023 TECHNIQUE:  CT examination of the abdomen and pelvis was performed  In addition to portal venous phase postcontrast scanning through the abdomen and pelvis, delayed phase postcontrast scanning was performed through the upper abdominal viscera  Multiplanar 2D reformatted images were created from the source data  Radiation dose length product (DLP) for this visit:  921 mGy-cm   This examination, like all CT scans performed in the [facility], was performed utilizing techniques to minimize radiation dose exposure, including the use of iterative reconstruction and automated exposure control  IV Contrast:  100 mL of iohexol (OMNIPAQUE)  350 Enteric Contrast:  Enteric contrast was not administered   FINDINGS: ABDOMEN LOWER CHEST:  Partially visualized known left lower lobe mass  LIVER/BILIARY TREE:  Unremarkable  GALLBLADDER:  No calcified gallstones  No pericholecystic inflammatory change  SPLEEN:  Unremarkable  PANCREAS:  Unremarkable  ADRENAL GLANDS:  Unremarkable  KIDNEYS/URETERS: One or more sharply circumscribed subcentimeter renal hypodensities are noted  These lesions are too small to accurately characterize, but are statistically most likely to represent benign cortical renal cyst(s)  According to the guidelines published in the [facility] Paper of the ACR Incidental Findings Committee (Radiology 2010), no further workup of these lesions is recommended  Bilateral renal simple cysts, the larger of which measures 3 9 cm on the right  No perinephric collection  STOMACH AND BOWEL:  Moderate diffuse thickening of the sigmoid colon and rectum with mild prominence of the adjacent vasa recta  Mild diffuse thickening of the descending colon  Colonic diverticulosis without diverticulitis  No bowel obstruction  APPENDIX:  A normal appendix was visualized  ABDOMINOPELVIC CAVITY:  No ascites  No pneumoperitoneum  No lymphadenopathy  VESSELS:  Aortoiliac calcification  No aneurysm  PELVIS REPRODUCTIVE ORGANS:  Unremarkable for patient's age  URINARY BLADDER:  Unremarkable  ABDOMINAL WALL/INGUINAL REGIONS:  Left gluteus minimus, gluteus medius and quadratus femoris intramuscular lipoma measuring approximately 3 cm, 10 cm and 4 cm respectively  Small fat-containing umbilical hernia  OSSEOUS STRUCTURES:  No acute fracture or osseous destructive lesion identified  Degenerative changes of the spine, pubic symphysis, and multiple joints  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 for liaison and referring practitioner notification  Workstation performed: [id number]     Answers for HPI/ROS submitted by the patient on 4/25/2023  Chronicity: new  Onset: in the past 7 days  Onset quality: sudden  Frequency: intermittently  Progression since onset: rapidly improving  Pain location: LLQ  Pain - numeric: 1/10  Pain quality: cramping  Radiates to: does not radiate  anorexia: No  arthralgias: No  belching: Yes  diarrhea: No  dysuria: No  fever: No  flatus: Yes  frequency: Yes  headaches: No  hematochezia: Yes  hematuria: No  melena: No  myalgias: No  nausea:  No  weight loss: No  vomiting: No  Aggravated by: nothing  Relieved by: bowel movements  Diagnostic workup: CT scan

## 2023-04-27 DIAGNOSIS — C34.92 ADENOCARCINOMA, LUNG, LEFT (HCC): Primary | ICD-10-CM

## 2023-04-27 RX ORDER — PREDNISONE 20 MG/1
20 TABLET ORAL DAILY
Qty: 30 TABLET | Refills: 0 | Status: SHIPPED | OUTPATIENT
Start: 2023-04-27

## 2023-04-27 NOTE — TELEPHONE ENCOUNTER
Patient c/o one day duration of liquid diarrhea stools, denies fever  Last dose of opdivo 10 days ago    D/W Dr Lily Venegas  Prednisone 20 mg daily ordered and sent to pharmacy  Patient verbalizes understanding  Patient will call office tomorrow to update status

## 2023-04-28 NOTE — TELEPHONE ENCOUNTER
Spoke with patient  Diarrhea is improved after one dose of prednisone  Will touch base with patient on 5/1/2023  Patient will call with worsening symptoms until then

## 2023-05-01 DIAGNOSIS — C34.92 ADENOCARCINOMA, LUNG, LEFT (HCC): Primary | ICD-10-CM

## 2023-05-01 RX ORDER — PREDNISONE 5 MG/1
TABLET ORAL
Qty: 20 TABLET | Refills: 0 | Status: SHIPPED | OUTPATIENT
Start: 2023-05-01

## 2023-05-01 NOTE — TELEPHONE ENCOUNTER
Spoke with patient  Diarrhea has now stopped  D/W Dr Karis Vasquez   Taper prednisone over the next 9 days and hold opdivo from next cycle of chemo  Patient verbalizes understanding  Will update [facility] staff via EPIC

## 2023-05-05 ENCOUNTER — ANESTHESIA EVENT (OUTPATIENT)
Dept: GASTROENTEROLOGY | Facility: HOSPITAL | Age: 73
End: 2023-05-05

## 2023-05-05 ENCOUNTER — HOSPITAL ENCOUNTER (OUTPATIENT)
Dept: GASTROENTEROLOGY | Facility: HOSPITAL | Age: 73
Setting detail: OUTPATIENT SURGERY
End: 2023-05-05
Attending: STUDENT IN AN ORGANIZED HEALTH CARE EDUCATION/TRAINING PROGRAM

## 2023-05-05 ENCOUNTER — ANESTHESIA (OUTPATIENT)
Dept: GASTROENTEROLOGY | Facility: HOSPITAL | Age: 73
End: 2023-05-05

## 2023-05-05 VITALS
RESPIRATION RATE: 16 BRPM | TEMPERATURE: 97.3 F | SYSTOLIC BLOOD PRESSURE: 136 MMHG | DIASTOLIC BLOOD PRESSURE: 72 MMHG | HEART RATE: 72 BPM | OXYGEN SATURATION: 98 % | WEIGHT: 170 LBS | HEIGHT: 70 IN | BODY MASS INDEX: 24.34 KG/M2

## 2023-05-05 DIAGNOSIS — K62.5 RECTAL BLEEDING: ICD-10-CM

## 2023-05-05 RX ORDER — PROPOFOL 10 MG/ML
INJECTION, EMULSION INTRAVENOUS CONTINUOUS PRN
Status: DISCONTINUED | OUTPATIENT
Start: 2023-05-05 | End: 2023-05-05

## 2023-05-05 RX ORDER — PROPOFOL 10 MG/ML
INJECTION, EMULSION INTRAVENOUS AS NEEDED
Status: DISCONTINUED | OUTPATIENT
Start: 2023-05-05 | End: 2023-05-05

## 2023-05-05 RX ORDER — SODIUM CHLORIDE, SODIUM LACTATE, POTASSIUM CHLORIDE, CALCIUM CHLORIDE 600; 310; 30; 20 MG/100ML; MG/100ML; MG/100ML; MG/100ML
125 INJECTION, SOLUTION INTRAVENOUS CONTINUOUS
Status: DISCONTINUED | OUTPATIENT
Start: 2023-05-05 | End: 2023-05-09 | Stop reason: HOSPADM

## 2023-05-05 RX ADMIN — PROPOFOL 150 MG: 10 INJECTION, EMULSION INTRAVENOUS at 13:07

## 2023-05-05 RX ADMIN — PROPOFOL 130 MCG/KG/MIN: 10 INJECTION, EMULSION INTRAVENOUS at 13:09

## 2023-05-05 RX ADMIN — SODIUM CHLORIDE, SODIUM LACTATE, POTASSIUM CHLORIDE, AND CALCIUM CHLORIDE 125 ML/HR: .6; .31; .03; .02 INJECTION, SOLUTION INTRAVENOUS at 12:34

## 2023-05-05 NOTE — ANESTHESIA POSTPROCEDURE EVALUATION
Post-Op Assessment Note    CV Status:  Stable    Pain management: adequate     Mental Status:  Alert and awake   Hydration Status:  Euvolemic   PONV Controlled:  Controlled   Airway Patency:  Patent      Post Op Vitals Reviewed: Yes      Staff: Anesthesiologist, CRNA         There were no known notable events for this encounter      BP   146/75   Temp  98   Pulse  67   Resp   18   02 sat   98

## 2023-05-05 NOTE — ANESTHESIA PREPROCEDURE EVALUATION
Procedure:  COLONOSCOPY    Relevant Problems   CARDIO   (+) Hypercholesterolemia      Respiratory   (+) Adenocarcinoma, lung, left (HCC)      Lung cancer, undergoing chemotherapy  Lab Results   Component Value Date    WBC 7 63 04/21/2023    HGB 14 2 04/21/2023    HCT 43 3 04/21/2023    MCV 96 04/21/2023     (L) 04/21/2023     Lab Results   Component Value Date    SODIUM 138 04/21/2023    K 4 3 04/21/2023     04/21/2023    CO2 26 04/21/2023    BUN 24 04/21/2023    CREATININE 0 84 04/21/2023    GLUC 103 04/21/2023    CALCIUM 9 1 04/21/2023     Lab Results   Component Value Date    INR 0 92 02/23/2023    PROTIME 13 1 02/23/2023     Lab Results   Component Value Date    HGBA1C 5 6 01/30/2023          Physical Exam    Airway    Mallampati score: I  TM Distance: >3 FB  Neck ROM: full     Dental   No notable dental hx     Cardiovascular      Pulmonary      Other Findings        Anesthesia Plan  ASA Score- 3     Anesthesia Type- IV sedation with anesthesia with ASA Monitors  Additional Monitors:   Airway Plan:           Plan Factors-Exercise tolerance (METS): >4 METS  Chart reviewed  Existing labs reviewed  Patient summary reviewed  Patient is not a current smoker  Obstructive sleep apnea risk education given perioperatively  Induction- intravenous  Postoperative Plan-     Informed Consent- Anesthetic plan and risks discussed with patient  I personally reviewed this patient with the CRNA  Discussed and agreed on the Anesthesia Plan with the CRNA  Dianna Gomez

## 2023-05-05 NOTE — H&P
"History and Physical -  [facility]  Do Carson 67 y o  male MRN: [id number]                  HPI: Do Carson is a 67y o  year old male who presents for rectal bleeding      REVIEW OF SYSTEMS: Per the HPI, and otherwise unremarkable      Historical Information   Past Medical History:   Diagnosis Date    BPH with obstruction/lower urinary tract symptoms     Cystitis, chronic     Epididymo-orchitis     Feeling of incomplete bladder emptying     Frequency of micturition     Nocturia     Other microscopic hematuria     Poor urinary stream     Simple renal cyst      Past Surgical History:   Procedure Laterality Date    CYSTOSCOPY      FRACTURE SURGERY      legs, arms, shoulder, back after accident    IR BIOPSY LUNG  2023     Social History   Social History     Substance and Sexual Activity   Alcohol Use Never     Social History     Substance and Sexual Activity   Drug Use Never     Social History     Tobacco Use   Smoking Status Former    Packs/day: 1 00    Years: 60 00    Pack years: 60 00    Types: Cigarettes    Quit date: 3/3/2023    Years since quittin 1   Smokeless Tobacco Never   Tobacco Comments    23 Patient noted smoking that equates to 60-80 pack years, exact pack per day amount varies over time     Family History   Problem Relation Age of Onset    Diabetes Mother     Hypertension Mother     No Known Problems Father        Meds/Allergies       Current Outpatient Medications:     predniSONE 5 mg tablet    Sodium Fluoride 5000 PPM 1 1 % GEL    ondansetron (ZOFRAN) 8 mg tablet    predniSONE 20 mg tablet    Current Facility-Administered Medications:     lactated ringers infusion, 125 mL/hr, Intravenous, Continuous, 125 mL/hr at 23 1234    No Known Allergies    Objective     /83   Pulse 81   Temp 97 6 °F (36 4 °C) (Temporal)   Resp 16   Ht 5' 10\" (1 778 m)   Wt 77 1 kg (170 lb)   SpO2 97%   BMI 24 39 kg/m²       PHYSICAL " EXAM    Gen: NAD  Head: NCAT  CV: RRR  CHEST: Clear  ABD: soft, NT/ND  EXT: no edema      ASSESSMENT/PLAN:  This is a 67y o  year old male here for colonoscopy, and he is stable and optimized for his procedure

## 2023-05-06 ENCOUNTER — APPOINTMENT (OUTPATIENT)
Dept: LAB | Facility: CLINIC | Age: 73
End: 2023-05-06

## 2023-05-06 DIAGNOSIS — C34.92 ADENOCARCINOMA, LUNG, LEFT (HCC): ICD-10-CM

## 2023-05-06 LAB
ALBUMIN SERPL BCP-MCNC: 3.5 G/DL (ref 3.5–5)
ALP SERPL-CCNC: 57 U/L (ref 46–116)
ALT SERPL W P-5'-P-CCNC: 27 U/L (ref 12–78)
ANION GAP SERPL CALCULATED.3IONS-SCNC: 0 MMOL/L (ref 4–13)
AST SERPL W P-5'-P-CCNC: 13 U/L (ref 5–45)
BASOPHILS # BLD AUTO: 0.04 THOUSANDS/ÂΜL (ref 0–0.1)
BASOPHILS NFR BLD AUTO: 1 % (ref 0–1)
BILIRUB SERPL-MCNC: 0.38 MG/DL (ref 0.2–1)
BUN SERPL-MCNC: 15 MG/DL (ref 5–25)
CALCIUM SERPL-MCNC: 9.2 MG/DL (ref 8.3–10.1)
CHLORIDE SERPL-SCNC: 107 MMOL/L (ref 96–108)
CO2 SERPL-SCNC: 30 MMOL/L (ref 21–32)
CREAT SERPL-MCNC: 0.85 MG/DL (ref 0.6–1.3)
EOSINOPHIL # BLD AUTO: 0.1 THOUSAND/ÂΜL (ref 0–0.61)
EOSINOPHIL NFR BLD AUTO: 1 % (ref 0–6)
ERYTHROCYTE [DISTWIDTH] IN BLOOD BY AUTOMATED COUNT: 13.5 % (ref 11.6–15.1)
GFR SERPL CREATININE-BSD FRML MDRD: 87 ML/MIN/1.73SQ M
GLUCOSE P FAST SERPL-MCNC: 101 MG/DL (ref 65–99)
HCT VFR BLD AUTO: 42.9 % (ref 36.5–49.3)
HGB BLD-MCNC: 14 G/DL (ref 12–17)
IMM GRANULOCYTES # BLD AUTO: 0.14 THOUSAND/UL (ref 0–0.2)
IMM GRANULOCYTES NFR BLD AUTO: 2 % (ref 0–2)
LYMPHOCYTES # BLD AUTO: 1.77 THOUSANDS/ÂΜL (ref 0.6–4.47)
LYMPHOCYTES NFR BLD AUTO: 20 % (ref 14–44)
MCH RBC QN AUTO: 31.5 PG (ref 26.8–34.3)
MCHC RBC AUTO-ENTMCNC: 32.6 G/DL (ref 31.4–37.4)
MCV RBC AUTO: 96 FL (ref 82–98)
MONOCYTES # BLD AUTO: 1.12 THOUSAND/ÂΜL (ref 0.17–1.22)
MONOCYTES NFR BLD AUTO: 13 % (ref 4–12)
NEUTROPHILS # BLD AUTO: 5.55 THOUSANDS/ÂΜL (ref 1.85–7.62)
NEUTS SEG NFR BLD AUTO: 63 % (ref 43–75)
NRBC BLD AUTO-RTO: 0 /100 WBCS
PLATELET # BLD AUTO: 237 THOUSANDS/UL (ref 149–390)
PMV BLD AUTO: 9.7 FL (ref 8.9–12.7)
POTASSIUM SERPL-SCNC: 4.1 MMOL/L (ref 3.5–5.3)
PROT SERPL-MCNC: 7.2 G/DL (ref 6.4–8.4)
RBC # BLD AUTO: 4.45 MILLION/UL (ref 3.88–5.62)
SODIUM SERPL-SCNC: 137 MMOL/L (ref 135–147)
T3FREE SERPL-MCNC: 3.05 PG/ML (ref 2.3–4.2)
TSH SERPL DL<=0.05 MIU/L-ACNC: 0.36 UIU/ML (ref 0.45–4.5)
WBC # BLD AUTO: 8.72 THOUSAND/UL (ref 4.31–10.16)

## 2023-05-09 ENCOUNTER — TELEPHONE (OUTPATIENT)
Dept: HEMATOLOGY ONCOLOGY | Facility: CLINIC | Age: 73
End: 2023-05-09

## 2023-05-09 ENCOUNTER — HOSPITAL ENCOUNTER (OUTPATIENT)
Dept: INFUSION CENTER | Facility: HOSPITAL | Age: 73
Discharge: HOME | End: 2023-05-09
Attending: INTERNAL MEDICINE

## 2023-05-09 VITALS
RESPIRATION RATE: 16 BRPM | HEIGHT: 70 IN | SYSTOLIC BLOOD PRESSURE: 132 MMHG | DIASTOLIC BLOOD PRESSURE: 78 MMHG | OXYGEN SATURATION: 100 % | HEART RATE: 66 BPM | BODY MASS INDEX: 24.46 KG/M2 | TEMPERATURE: 97.1 F | WEIGHT: 170.86 LBS

## 2023-05-09 DIAGNOSIS — C34.92 ADENOCARCINOMA, LUNG, LEFT (HCC): Primary | ICD-10-CM

## 2023-05-09 RX ORDER — SODIUM CHLORIDE 9 MG/ML
20 INJECTION, SOLUTION INTRAVENOUS ONCE
Status: COMPLETED | OUTPATIENT
Start: 2023-05-09 | End: 2023-05-09

## 2023-05-09 RX ORDER — PALONOSETRON 0.05 MG/ML
0.25 INJECTION, SOLUTION INTRAVENOUS ONCE
Status: COMPLETED | OUTPATIENT
Start: 2023-05-09 | End: 2023-05-09

## 2023-05-09 RX ADMIN — DEXAMETHASONE SODIUM PHOSPHATE 20 MG: 10 INJECTION, SOLUTION INTRAMUSCULAR; INTRAVENOUS at 09:54

## 2023-05-09 RX ADMIN — SODIUM CHLORIDE 20 ML/HR: 0.9 INJECTION, SOLUTION INTRAVENOUS at 08:50

## 2023-05-09 RX ADMIN — DIPHENHYDRAMINE HYDROCHLORIDE 25 MG: 50 INJECTION INTRAMUSCULAR; INTRAVENOUS at 09:10

## 2023-05-09 RX ADMIN — FAMOTIDINE 20 MG: 10 INJECTION INTRAVENOUS at 09:32

## 2023-05-09 RX ADMIN — CARBOPLATIN 551 MG: 10 INJECTION, SOLUTION INTRAVENOUS at 14:12

## 2023-05-09 RX ADMIN — FOSAPREPITANT 150 MG: 150 INJECTION, POWDER, LYOPHILIZED, FOR SOLUTION INTRAVENOUS at 10:18

## 2023-05-09 RX ADMIN — PALONOSETRON 0.25 MG: 0.05 INJECTION, SOLUTION INTRAVENOUS at 09:05

## 2023-05-09 RX ADMIN — PACLITAXEL 336 MG: 6 INJECTION, SOLUTION, CONCENTRATE INTRAVENOUS at 11:03

## 2023-05-09 NOTE — TELEPHONE ENCOUNTER
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

## 2023-05-09 NOTE — PROGRESS NOTES
Pt tolerated Taxol/Carbo without difficulty  No s/s reaction noted  No further chemo tx plan at this time  Aware of office follow up  AVS provided  Left ambulatory in stable condition

## 2023-05-12 ENCOUNTER — TELEPHONE (OUTPATIENT)
Dept: HEMATOLOGY ONCOLOGY | Facility: MEDICAL CENTER | Age: 73
End: 2023-05-12

## 2023-05-12 ENCOUNTER — APPOINTMENT (OUTPATIENT)
Dept: LAB | Facility: HOSPITAL | Age: 73
End: 2023-05-12

## 2023-05-12 DIAGNOSIS — C34.92 ADENOCARCINOMA, LUNG, LEFT (HCC): Primary | ICD-10-CM

## 2023-05-12 DIAGNOSIS — C34.92 ADENOCARCINOMA, LUNG, LEFT (HCC): ICD-10-CM

## 2023-05-12 LAB
BASOPHILS # BLD AUTO: 0 THOUSANDS/ÂΜL (ref 0–0.1)
BASOPHILS NFR BLD AUTO: 0 % (ref 0–1)
EOSINOPHIL # BLD AUTO: 0.01 THOUSAND/ÂΜL (ref 0–0.61)
EOSINOPHIL NFR BLD AUTO: 0 % (ref 0–6)
ERYTHROCYTE [DISTWIDTH] IN BLOOD BY AUTOMATED COUNT: 13.2 % (ref 11.6–15.1)
HCT VFR BLD AUTO: 41.1 % (ref 36.5–49.3)
HGB BLD-MCNC: 13.2 G/DL (ref 12–17)
IMM GRANULOCYTES # BLD AUTO: 0.05 THOUSAND/UL (ref 0–0.2)
IMM GRANULOCYTES NFR BLD AUTO: 0 % (ref 0–2)
LYMPHOCYTES # BLD AUTO: 1.06 THOUSANDS/ÂΜL (ref 0.6–4.47)
LYMPHOCYTES NFR BLD AUTO: 10 % (ref 14–44)
MCH RBC QN AUTO: 31.7 PG (ref 26.8–34.3)
MCHC RBC AUTO-ENTMCNC: 32.1 G/DL (ref 31.4–37.4)
MCV RBC AUTO: 99 FL (ref 82–98)
MONOCYTES # BLD AUTO: 0.19 THOUSAND/ÂΜL (ref 0.17–1.22)
MONOCYTES NFR BLD AUTO: 2 % (ref 4–12)
NEUTROPHILS # BLD AUTO: 9.84 THOUSANDS/ÂΜL (ref 1.85–7.62)
NEUTS SEG NFR BLD AUTO: 88 % (ref 43–75)
NRBC BLD AUTO-RTO: 0 /100 WBCS
PLATELET # BLD AUTO: 141 THOUSANDS/UL (ref 149–390)
PMV BLD AUTO: 9.6 FL (ref 8.9–12.7)
RBC # BLD AUTO: 4.16 MILLION/UL (ref 3.88–5.62)
WBC # BLD AUTO: 11.15 THOUSAND/UL (ref 4.31–10.16)

## 2023-05-12 NOTE — TELEPHONE ENCOUNTER
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

## 2023-05-15 ENCOUNTER — TELEPHONE (OUTPATIENT)
Dept: HEMATOLOGY ONCOLOGY | Facility: MEDICAL CENTER | Age: 73
End: 2023-05-15

## 2023-05-15 NOTE — TELEPHONE ENCOUNTER
"Received call from patient  Patient started prednisone 20 mg on 5/12/2023  Patient continues with liquid stools but rectal bleeding has diminished  'I went 40 times yesterday\"  Patient took an imodium at 0600 today which lessened frequency of diarrhea  Patient denies lightheadedness, is keeping hydrated  Will D/W Dr Janeen Gotti and get back to patient  "

## 2023-05-23 ENCOUNTER — HOSPITAL ENCOUNTER (OUTPATIENT)
Dept: RADIOLOGY | Age: 73
Discharge: HOME | End: 2023-05-23

## 2023-05-23 DIAGNOSIS — C34.92 ADENOCARCINOMA OF LEFT LUNG (HCC): ICD-10-CM

## 2023-05-23 DIAGNOSIS — C34.32 PRIMARY ADENOCARCINOMA OF LOWER LOBE OF LEFT LUNG (HCC): ICD-10-CM

## 2023-05-23 LAB — GLUCOSE SERPL-MCNC: 97 MG/DL (ref 65–140)

## 2023-05-24 ENCOUNTER — OFFICE VISIT (OUTPATIENT)
Dept: CARDIAC SURGERY | Facility: CLINIC | Age: 73
End: 2023-05-24

## 2023-05-24 VITALS
HEIGHT: 70 IN | RESPIRATION RATE: 17 BRPM | OXYGEN SATURATION: 99 % | WEIGHT: 165.57 LBS | BODY MASS INDEX: 23.7 KG/M2 | TEMPERATURE: 97.8 F | SYSTOLIC BLOOD PRESSURE: 127 MMHG | DIASTOLIC BLOOD PRESSURE: 81 MMHG | HEART RATE: 77 BPM

## 2023-05-24 DIAGNOSIS — C34.92 ADENOCARCINOMA OF LEFT LUNG (HCC): Primary | ICD-10-CM

## 2023-05-24 DIAGNOSIS — R79.1 ABNORMAL COAGULATION PROFILE: ICD-10-CM

## 2023-05-24 RX ORDER — ACETAMINOPHEN 325 MG/1
975 TABLET ORAL ONCE
OUTPATIENT
Start: 2023-05-24 | End: 2023-05-24

## 2023-05-24 RX ORDER — GABAPENTIN 300 MG/1
300 CAPSULE ORAL ONCE
OUTPATIENT
Start: 2023-05-24 | End: 2023-05-24

## 2023-05-24 RX ORDER — HEPARIN SODIUM 5000 [USP'U]/ML
5000 INJECTION, SOLUTION INTRAVENOUS; SUBCUTANEOUS
OUTPATIENT
Start: 2023-05-24 | End: 2023-05-25

## 2023-05-24 RX ORDER — CEFAZOLIN SODIUM 1 G/50ML
1000 SOLUTION INTRAVENOUS ONCE
OUTPATIENT
Start: 2023-05-24 | End: 2023-05-24

## 2023-05-24 NOTE — PROGRESS NOTES
Assessment/Plan:    Adenocarcinoma, lung, left [facility])  Mr Stephanie Wynn presents to discuss surgical resection of a Stage IIA left lower lobe lung cancer following induction chemotherapy  He is recovering from his final session of chemotherapy from 5/9 which was the most difficult of them all  His fatigue is improving and he has mostly maintained his weight  His breathing is no different than previously, and his PFTs were great  We discussed a robotic left lower lobectomy and MLND  The surgery was completely presented, as were post-operative expectations  Consent was obtained  He will need pre-operative blood work       Diagnoses and all orders for this visit:    Adenocarcinoma of left lung ([facility] )  -     EKG 12 lead; Future  -     Type and screen; Future  -     APTT; Future  -     Protime-INR; Future  -     CBC and Platelet; Future  -     Basic metabolic panel; Future  -     Case request operating room: LOWER LOBECTOMY LUNG THORACOSCOPIC W/ ROBOTICS, MLND  , BRONCHOSCOPY FLEXIBLE; Standing  -     Case request operating room: LOWER LOBECTOMY LUNG THORACOSCOPIC W/ ROBOTICS, MLND  , BRONCHOSCOPY FLEXIBLE    Abnormal coagulation profile    Other orders  -     Diet NPO; Sips with meds; Standing  -     Nursing communication Please give pre-op Carbohydrate drink to patient 2-4 hours prior to surgery; Standing  -     Void on call to OR; Standing  -     Insert peripheral IV;  Standing  -     Place sequential compression device; Standing  -     heparin (porcine) subcutaneous injection 5,000 Units  -     gabapentin (NEURONTIN) capsule 300 mg  -     acetaminophen (TYLENOL) tablet 975 mg  -     ceFAZolin (ANCEF) IVPB (premix in dextrose) 1,000 mg 50 mL          Thoracic History   Cancer Staging     Oncology History   Adenocarcinoma, lung, left ([facility] )   3/6/2023 Initial Diagnosis    [facility] )     3/28/2023 -  Chemotherapy    palonosetron (ALOXI), 0 25 mg, Intravenous, Once, 3 of 3 cycles  Administration: 0 25 mg (3/28/2023), 0 25 mg (4/18/2023), 0 25 mg (5/9/2023)  fosaprepitant (EMEND) IVPB, 150 mg, Intravenous, Once, 3 of 3 cycles  Administration: 150 mg (3/28/2023), 150 mg (4/18/2023), 150 mg (5/9/2023)  nivolumab (OPDIVO) IVPB, 360 mg, Intravenous, Once, 2 of 2 cycles  Administration: 360 mg (3/28/2023), 360 mg (4/18/2023)  CARBOplatin (PARAPLATIN) IVPB (GOG AUC DOSING), 561 5 mg, Intravenous, Once, 3 of 3 cycles  Administration: 561 5 mg (3/28/2023), 532 mg (4/18/2023), 551 mg (5/9/2023)  PACLItaxel (TAXOL) chemo IVPB, 175 mg/m2 = 336 mg, Intravenous, Once, 3 of 3 cycles  Administration: 336 mg (3/28/2023), 336 mg (4/18/2023), 336 mg (5/9/2023)              Subjective:    Patient ID: Joseline Banda is a 67 y o  male  ecog 0  HPI   Mr Hernandez Lorenzana is a 67year old man with Stage IIA lung cancer who completed 3 cycles of induction chemotherapy on 5/9/23  Repeat PET-CT 5/23/23 demonstrates decrease in size of the left lower lobe mass from 4 6 to 3 4cm, SUV is stable at 2 2  There are no enlarged mediastinal or hilar lymph nodes  There are no areas suspicious for metastatic disease  There is decreased activity in the left mandible, likely inflammatory  PFTs 3/17/23 show an FEV1 113% and DLCO 101%  On discussion, he is improving from chemotherapy, still with some fatigue  He only lost a few pounds from chemotherapy, but his appetite is good  He had neuropathy initially which improved  No fevers or chills  He walks his dog three times per day  The following portions of the patient's history were reviewed and updated as appropriate: allergies, current medications, past family history, past medical history, past social history, past surgical history and problem list     Review of Systems   Constitutional: Positive for fatigue  All other systems reviewed and are negative  Objective:   Physical Exam  Vitals reviewed  Constitutional:       General: He is not in acute distress  Appearance: Normal appearance  "He is well-developed  He is not diaphoretic  HENT:      Head: Normocephalic and atraumatic  Eyes:      General: No scleral icterus  Extraocular Movements: Extraocular movements intact  Neck:      Trachea: No tracheal deviation  Cardiovascular:      Rate and Rhythm: Normal rate and regular rhythm  Pulses: Normal pulses  Heart sounds: Normal heart sounds  No murmur heard  Pulmonary:      Effort: Pulmonary effort is normal  No respiratory distress  Breath sounds: Normal breath sounds  No wheezing  Abdominal:      General: Bowel sounds are normal  There is no distension  Palpations: Abdomen is soft  Musculoskeletal:         General: Normal range of motion  Cervical back: Normal range of motion and neck supple  Right lower leg: No edema  Left lower leg: No edema  Lymphadenopathy:      Cervical: No cervical adenopathy  Skin:     General: Skin is warm and dry  Findings: No erythema  Neurological:      Mental Status: He is alert and oriented to person, place, and time  Cranial Nerves: No cranial nerve deficit  Psychiatric:         Mood and Affect: Mood normal          Behavior: Behavior normal          Thought Content: Thought content normal      /81 (BP Location: Right arm, Patient Position: Sitting, Cuff Size: Standard)   Pulse 77   Temp 97 8 °F (36 6 °C) (Temporal)   Resp 17   Ht 5' 10\" (1 778 m)   Wt 75 1 kg (165 lb 9 1 oz)   SpO2 99%   BMI 23 76 kg/m²       NM PET CT skull base to mid thigh    Result Date: 5/23/2023  Narrative PET/CT SCAN INDICATION: C34 92: Malignant neoplasm of unspecified part of left bronchus or lung C34 32: Malignant neoplasm of lower lobe, left bronchus or lung Restaging study following neoadjuvant therapy, lung cancer  MODIFIER: PS COMPARISON: 3/16/2023, CT abdomen and pelvis 4/21/2023 CELL TYPE: Left lower lobe adenocarcinoma TECHNIQUE:   8 3 mCi F-18-FDG administered IV   Multiplanar attenuation corrected and non " attenuation corrected PET images are available for interpretation, and contiguous, low dose, axial CT sections were obtained from the skull base through the femurs  Intravenous contrast material was not utilized  This examination, like all CT scans performed in the [facility], was performed utilizing techniques to minimize radiation dose exposure, including the use of iterative reconstruction and automated exposure control  Fasting serum glucose: 97 mg/dl FINDINGS: VISUALIZED BRAIN: No acute abnormalities are seen  HEAD/NECK: -No suspicious FDG avid cervical adenopathy - Prior study demonstrated increased activity of the left mandible, SUV max was 5 9 now 3 2  This may be associated with diminished inflammation associated with dental/periodontal disease - Mild bilateral symmetric and diffuse thyroid activity can be a normal physiologic finding or related to thyroid dysfunction; correlate with thyroid function tests as clinically indicated CT images: Otherwise unremarkable CHEST: -Again seen is the semisolid left lower lobe mass earlier up to 4 6 cm in size currently 3 4 cm  Prior SUV max was 2 2, currently 2 1  - No new parenchymal abnormalities - No development of FDG avid mediastinal or hilar adenopathy CT images: Coronary artery calcification  No pericardial or pleural effusion ABDOMEN: No FDG avid soft tissue lesions are seen  CT images: Presumed bilateral renal cysts  Moderately increased stool within the colon  Small periumbilical adipose containing hernia  PELVIS: No FDG avid soft tissue lesions are seen  CT images: Left gluteal intramuscular lipoma  OSSEOUS STRUCTURES: No FDG avid lesions are seen  CT images: No significant findings  Impression 1  The semisolid left lower lobe mass has diminished in size  FDG avidity has not significantly changed  2  No new FDG-avid abnormalities within the thorax  No evidence of FDG avid metastatic disease in the neck, abdomen, pelvis or skeleton 3  Partially diminished FDG avidity of the left mandible Workstation performed: [id number]     NM PET CT skull base to mid thigh    Result Date: 3/16/2023  Narrative PET/CT SCAN INDICATION: New diagnosis of lung cancer  Initial staging  C34 92: Malignant neoplasm of unspecified part of left bronchus or lung C34 02: Malignant neoplasm of left main bronchus MODIFIER: PI COMPARISON: CT chest 1/30/2023 CELL TYPE:  Adenocarcinoma of the lung TECHNIQUE:   8 5 mCi F-18-FDG administered IV  Multiplanar attenuation corrected and non attenuation corrected PET images are available for interpretation, and contiguous, low dose, axial CT sections were obtained from the skull vertex through the femurs  Intravenous contrast material was not utilized  This examination, like all CT scans performed in the [facility], was performed utilizing techniques to minimize radiation dose exposure, including the use of iterative reconstruction and automated exposure control  Fasting serum glucose: 84 mg/dl FINDINGS: VISUALIZED BRAIN:   No acute abnormalities are seen  HEAD/NECK:   Focal radiotracer uptake at the left mandible, SUV max of 5 9  There is a focal lucency adjacent to a more on CT measuring up to 9 mm in size image 67 series 3  No FDG avid lymph nodes  CT images: No additional findings  CHEST:   Mild radiotracer uptake related to the left lower lobe mass, SUV max of 2 2  This is a semi solid density on CT measures up to 4 6 cm in size, stable  No suspicious focal FDG uptake in the mediastinal or perihilar regions  CT images: Mild biapical nodularity better seen on prior chest CT  No suspicious FDG uptake here  ABDOMEN:   No FDG avid soft tissue lesions are seen  CT images: A few renal hypodensities likely cysts  Moderate fecal retention in the colon  Small fat-containing focal hernia  Colonic diverticulosis  PELVIS: No FDG avid soft tissue lesions are seen   CT images: 5 2 cm lipoma of the left gluteal musculature anteriorly  OSSEOUS STRUCTURES: No FDG avid lesions are seen  Focal uptake inferior to the right glenoid likely inflammatory such as related to arthritis given the location  No obvious findings on CT  CT images: No significant findings  Impression 1  The left lower lobe mass demonstrates mild FDG uptake compatible with the known malignancy  The low level of uptake would favor a low-grade malignancy  2  No findings for hypermetabolic metastasis  3   Focal radiotracer uptake at the left mandible adjacent to a mandibular molar with corresponding lucent lesion on CT  Findings suggest inflammatory periodontal disease   Correlate with dental exam  Workstation performed: [id number]

## 2023-05-24 NOTE — ASSESSMENT & PLAN NOTE
Mr  Stephanie Wynn presents to discuss surgical resection of a Stage IIA left lower lobe lung cancer following induction chemotherapy  He is recovering from his final session of chemotherapy from 5/9 which was the most difficult of them all  His fatigue is improving and he has mostly maintained his weight  His breathing is no different than previously, and his PFTs were great  We discussed a robotic left lower lobectomy and MLND  The surgery was completely presented, as were post-operative expectations  Consent was obtained   He will need pre-operative blood work

## 2023-05-24 NOTE — H&P (VIEW-ONLY)
Assessment/Plan:    Adenocarcinoma, lung, left [facility])  Mr Inocencio Louise presents to discuss surgical resection of a Stage IIA left lower lobe lung cancer following induction chemotherapy  He is recovering from his final session of chemotherapy from 5/9 which was the most difficult of them all  His fatigue is improving and he has mostly maintained his weight  His breathing is no different than previously, and his PFTs were great  We discussed a robotic left lower lobectomy and MLND  The surgery was completely presented, as were post-operative expectations  Consent was obtained  He will need pre-operative blood work       Diagnoses and all orders for this visit:    Adenocarcinoma of left lung ([facility] )  -     EKG 12 lead; Future  -     Type and screen; Future  -     APTT; Future  -     Protime-INR; Future  -     CBC and Platelet; Future  -     Basic metabolic panel; Future  -     Case request operating room: LOWER LOBECTOMY LUNG THORACOSCOPIC W/ ROBOTICS, MLND  , BRONCHOSCOPY FLEXIBLE; Standing  -     Case request operating room: LOWER LOBECTOMY LUNG THORACOSCOPIC W/ ROBOTICS, MLND  , BRONCHOSCOPY FLEXIBLE    Abnormal coagulation profile    Other orders  -     Diet NPO; Sips with meds; Standing  -     Nursing communication Please give pre-op Carbohydrate drink to patient 2-4 hours prior to surgery; Standing  -     Void on call to OR; Standing  -     Insert peripheral IV;  Standing  -     Place sequential compression device; Standing  -     heparin (porcine) subcutaneous injection 5,000 Units  -     gabapentin (NEURONTIN) capsule 300 mg  -     acetaminophen (TYLENOL) tablet 975 mg  -     ceFAZolin (ANCEF) IVPB (premix in dextrose) 1,000 mg 50 mL          Thoracic History   Cancer Staging     Oncology History   Adenocarcinoma, lung, left ([facility] )   3/6/2023 Initial Diagnosis    Adenocarcinoma, lung, left ([facility] )     3/28/2023 -  Chemotherapy    palonosetron (ALOXI), 0 25 mg, Intravenous, Once, 3 of 3 cycles  Administration: 0 25 mg (3/28/2023), 0 25 mg (4/18/2023), 0 25 mg (5/9/2023)  fosaprepitant (EMEND) IVPB, 150 mg, Intravenous, Once, 3 of 3 cycles  Administration: 150 mg (3/28/2023), 150 mg (4/18/2023), 150 mg (5/9/2023)  nivolumab (OPDIVO) IVPB, 360 mg, Intravenous, Once, 2 of 2 cycles  Administration: 360 mg (3/28/2023), 360 mg (4/18/2023)  CARBOplatin (PARAPLATIN) IVPB (GOG AUC DOSING), 561 5 mg, Intravenous, Once, 3 of 3 cycles  Administration: 561 5 mg (3/28/2023), 532 mg (4/18/2023), 551 mg (5/9/2023)  PACLItaxel (TAXOL) chemo IVPB, 175 mg/m2 = 336 mg, Intravenous, Once, 3 of 3 cycles  Administration: 336 mg (3/28/2023), 336 mg (4/18/2023), 336 mg (5/9/2023)              Subjective:    Patient ID: Cori Perez is a 67 y o  male  ecog 0  HPI   Mr Omari Albert is a 67year old man with Stage IIA lung cancer who completed 3 cycles of induction chemotherapy on 5/9/23  Repeat PET-CT 5/23/23 demonstrates decrease in size of the left lower lobe mass from 4 6 to 3 4cm, SUV is stable at 2 2  There are no enlarged mediastinal or hilar lymph nodes  There are no areas suspicious for metastatic disease  There is decreased activity in the left mandible, likely inflammatory  PFTs 3/17/23 show an FEV1 113% and DLCO 101%  On discussion, he is improving from chemotherapy, still with some fatigue  He only lost a few pounds from chemotherapy, but his appetite is good  He had neuropathy initially which improved  No fevers or chills  He walks his dog three times per day  The following portions of the patient's history were reviewed and updated as appropriate: allergies, current medications, past family history, past medical history, past social history, past surgical history and problem list     Review of Systems   Constitutional: Positive for fatigue  All other systems reviewed and are negative  Objective:   Physical Exam  Vitals reviewed  Constitutional:       General: He is not in acute distress  Appearance: Normal appearance  "He is well-developed  He is not diaphoretic  HENT:      Head: Normocephalic and atraumatic  Eyes:      General: No scleral icterus  Extraocular Movements: Extraocular movements intact  Neck:      Trachea: No tracheal deviation  Cardiovascular:      Rate and Rhythm: Normal rate and regular rhythm  Pulses: Normal pulses  Heart sounds: Normal heart sounds  No murmur heard  Pulmonary:      Effort: Pulmonary effort is normal  No respiratory distress  Breath sounds: Normal breath sounds  No wheezing  Abdominal:      General: Bowel sounds are normal  There is no distension  Palpations: Abdomen is soft  Musculoskeletal:         General: Normal range of motion  Cervical back: Normal range of motion and neck supple  Right lower leg: No edema  Left lower leg: No edema  Lymphadenopathy:      Cervical: No cervical adenopathy  Skin:     General: Skin is warm and dry  Findings: No erythema  Neurological:      Mental Status: He is alert and oriented to person, place, and time  Cranial Nerves: No cranial nerve deficit  Psychiatric:         Mood and Affect: Mood normal          Behavior: Behavior normal          Thought Content: Thought content normal      /81 (BP Location: Right arm, Patient Position: Sitting, Cuff Size: Standard)   Pulse 77   Temp 97 8 °F (36 6 °C) (Temporal)   Resp 17   Ht 5' 10\" (1 778 m)   Wt 75 1 kg (165 lb 9 1 oz)   SpO2 99%   BMI 23 76 kg/m²       NM PET CT skull base to mid thigh    Result Date: 5/23/2023  Narrative PET/CT SCAN INDICATION: C34 92: Malignant neoplasm of unspecified part of left bronchus or lung C34 32: Malignant neoplasm of lower lobe, left bronchus or lung Restaging study following neoadjuvant therapy, lung cancer  MODIFIER: PS COMPARISON: 3/16/2023, CT abdomen and pelvis 4/21/2023 CELL TYPE: Left lower lobe adenocarcinoma TECHNIQUE:   8 3 mCi F-18-FDG administered IV   Multiplanar attenuation corrected and non " attenuation corrected PET images are available for interpretation, and contiguous, low dose, axial CT sections were obtained from the skull base through the femurs  Intravenous contrast material was not utilized  This examination, like all CT scans performed in the [facility], was performed utilizing techniques to minimize radiation dose exposure, including the use of iterative reconstruction and automated exposure control  Fasting serum glucose: 97 mg/dl FINDINGS: VISUALIZED BRAIN: No acute abnormalities are seen  HEAD/NECK: -No suspicious FDG avid cervical adenopathy - Prior study demonstrated increased activity of the left mandible, SUV max was 5 9 now 3 2  This may be associated with diminished inflammation associated with dental/periodontal disease - Mild bilateral symmetric and diffuse thyroid activity can be a normal physiologic finding or related to thyroid dysfunction; correlate with thyroid function tests as clinically indicated CT images: Otherwise unremarkable CHEST: -Again seen is the semisolid left lower lobe mass earlier up to 4 6 cm in size currently 3 4 cm  Prior SUV max was 2 2, currently 2 1  - No new parenchymal abnormalities - No development of FDG avid mediastinal or hilar adenopathy CT images: Coronary artery calcification  No pericardial or pleural effusion ABDOMEN: No FDG avid soft tissue lesions are seen  CT images: Presumed bilateral renal cysts  Moderately increased stool within the colon  Small periumbilical adipose containing hernia  PELVIS: No FDG avid soft tissue lesions are seen  CT images: Left gluteal intramuscular lipoma  OSSEOUS STRUCTURES: No FDG avid lesions are seen  CT images: No significant findings  Impression 1  The semisolid left lower lobe mass has diminished in size  FDG avidity has not significantly changed  2  No new FDG-avid abnormalities within the thorax  No evidence of FDG avid metastatic disease in the neck, abdomen, pelvis or skeleton 3  Partially diminished FDG avidity of the left mandible Workstation performed: [id number]     NM PET CT skull base to mid thigh    Result Date: 3/16/2023  Narrative PET/CT SCAN INDICATION: New diagnosis of lung cancer  Initial staging  C34 92: Malignant neoplasm of unspecified part of left bronchus or lung C34 02: Malignant neoplasm of left main bronchus MODIFIER: PI COMPARISON: CT chest 1/30/2023 CELL TYPE:  Adenocarcinoma of the lung TECHNIQUE:   8 5 mCi F-18-FDG administered IV  Multiplanar attenuation corrected and non attenuation corrected PET images are available for interpretation, and contiguous, low dose, axial CT sections were obtained from the skull vertex through the femurs  Intravenous contrast material was not utilized  This examination, like all CT scans performed in the [facility], was performed utilizing techniques to minimize radiation dose exposure, including the use of iterative reconstruction and automated exposure control  Fasting serum glucose: 84 mg/dl FINDINGS: VISUALIZED BRAIN:   No acute abnormalities are seen  HEAD/NECK:   Focal radiotracer uptake at the left mandible, SUV max of 5 9  There is a focal lucency adjacent to a more on CT measuring up to 9 mm in size image 67 series 3  No FDG avid lymph nodes  CT images: No additional findings  CHEST:   Mild radiotracer uptake related to the left lower lobe mass, SUV max of 2 2  This is a semi solid density on CT measures up to 4 6 cm in size, stable  No suspicious focal FDG uptake in the mediastinal or perihilar regions  CT images: Mild biapical nodularity better seen on prior chest CT  No suspicious FDG uptake here  ABDOMEN:   No FDG avid soft tissue lesions are seen  CT images: A few renal hypodensities likely cysts  Moderate fecal retention in the colon  Small fat-containing focal hernia  Colonic diverticulosis  PELVIS: No FDG avid soft tissue lesions are seen   CT images: 5 2 cm lipoma of the left gluteal musculature anteriorly  OSSEOUS STRUCTURES: No FDG avid lesions are seen  Focal uptake inferior to the right glenoid likely inflammatory such as related to arthritis given the location  No obvious findings on CT  CT images: No significant findings  Impression 1  The left lower lobe mass demonstrates mild FDG uptake compatible with the known malignancy  The low level of uptake would favor a low-grade malignancy  2  No findings for hypermetabolic metastasis  3   Focal radiotracer uptake at the left mandible adjacent to a mandibular molar with corresponding lucent lesion on CT  Findings suggest inflammatory periodontal disease   Correlate with dental exam  Workstation performed: [id number]

## 2023-05-30 ENCOUNTER — APPOINTMENT (OUTPATIENT)
Dept: LAB | Facility: HOSPITAL | Age: 73
End: 2023-05-30

## 2023-05-30 ENCOUNTER — LAB REQUISITION (OUTPATIENT)
Dept: LAB | Facility: HOSPITAL | Age: 73
End: 2023-05-30

## 2023-05-30 ENCOUNTER — OFFICE VISIT (OUTPATIENT)
Dept: LAB | Facility: HOSPITAL | Age: 73
End: 2023-05-30

## 2023-05-30 DIAGNOSIS — C34.92 ADENOCARCINOMA OF LEFT LUNG (HCC): ICD-10-CM

## 2023-05-30 DIAGNOSIS — C34.92 MALIGNANT NEOPLASM OF UNSPECIFIED PART OF LEFT BRONCHUS OR LUNG (HCC): ICD-10-CM

## 2023-05-30 LAB
ANION GAP SERPL CALCULATED.3IONS-SCNC: 6 MMOL/L (ref 4–13)
APTT PPP: 24 SECONDS (ref 23–37)
ATRIAL RATE: 57 BPM
BUN SERPL-MCNC: 18 MG/DL (ref 5–25)
CALCIUM SERPL-MCNC: 9 MG/DL (ref 8.4–10.2)
CHLORIDE SERPL-SCNC: 107 MMOL/L (ref 96–108)
CO2 SERPL-SCNC: 29 MMOL/L (ref 21–32)
CREAT SERPL-MCNC: 0.82 MG/DL (ref 0.6–1.3)
ERYTHROCYTE [DISTWIDTH] IN BLOOD BY AUTOMATED COUNT: 15 % (ref 11.6–15.1)
GFR SERPL CREATININE-BSD FRML MDRD: 88 ML/MIN/1.73SQ M
GLUCOSE P FAST SERPL-MCNC: 104 MG/DL (ref 65–99)
HCT VFR BLD AUTO: 37.9 % (ref 36.5–49.3)
HGB BLD-MCNC: 11.8 G/DL (ref 12–17)
INR PPP: 1 (ref 0.84–1.19)
MCH RBC QN AUTO: 31.9 PG (ref 26.8–34.3)
MCHC RBC AUTO-ENTMCNC: 31.1 G/DL (ref 31.4–37.4)
MCV RBC AUTO: 102 FL (ref 82–98)
P AXIS: 76 DEGREES
PLATELET # BLD AUTO: 236 THOUSANDS/UL (ref 149–390)
PMV BLD AUTO: 9 FL (ref 8.9–12.7)
POTASSIUM SERPL-SCNC: 4.2 MMOL/L (ref 3.5–5.3)
PR INTERVAL: 162 MS
PROTHROMBIN TIME: 13.2 SECONDS (ref 11.6–14.5)
QRS AXIS: -22 DEGREES
QRSD INTERVAL: 94 MS
QT INTERVAL: 386 MS
QTC INTERVAL: 375 MS
RBC # BLD AUTO: 3.7 MILLION/UL (ref 3.88–5.62)
SODIUM SERPL-SCNC: 142 MMOL/L (ref 135–147)
T WAVE AXIS: 46 DEGREES
VENTRICULAR RATE: 57 BPM
WBC # BLD AUTO: 6.41 THOUSAND/UL (ref 4.31–10.16)

## 2023-05-31 ENCOUNTER — OFFICE VISIT (OUTPATIENT)
Dept: HEMATOLOGY ONCOLOGY | Facility: CLINIC | Age: 73
End: 2023-05-31

## 2023-05-31 VITALS
HEIGHT: 70 IN | HEART RATE: 87 BPM | DIASTOLIC BLOOD PRESSURE: 78 MMHG | SYSTOLIC BLOOD PRESSURE: 118 MMHG | OXYGEN SATURATION: 97 % | WEIGHT: 165 LBS | BODY MASS INDEX: 23.62 KG/M2 | TEMPERATURE: 97.3 F | RESPIRATION RATE: 18 BRPM

## 2023-05-31 DIAGNOSIS — C34.92 ADENOCARCINOMA, LUNG, LEFT (HCC): Primary | ICD-10-CM
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
## 2023-06-01 LAB
ABO GROUP BLD: NORMAL
BLD GP AB SCN SERPL QL: NEGATIVE
RH BLD: POSITIVE
SPECIMEN EXPIRATION DATE: NORMAL

## 2023-06-01 NOTE — PRE-PROCEDURE INSTRUCTIONS
Pre-Surgery Instructions:   Medication Instructions   • predniSONE 20 mg tablet Take day of surgery  Pt denies fever, sob, sore throat and cough  Pt  verbalized understanding of shower, pre-op ensure  and med instructions  Pt instructed to stop nsaids and supplements one week prior to surgery

## 2023-06-07 ENCOUNTER — ANESTHESIA EVENT (OUTPATIENT)
Dept: PERIOP | Facility: HOSPITAL | Age: 73
DRG: 165 | End: 2023-06-07
Payer: MEDICARE

## 2023-06-07 NOTE — ANESTHESIA PREPROCEDURE EVALUATION
Procedure:  Left robotic assisted lower lobectomy w/ mediastinal lymph node dissection (Left: Chest)  BRONCHOSCOPY FLEXIBLE ([place])    Relevant Problems   ANESTHESIA (within normal limits)   (-) History of anesthesia complications      CARDIO   (+) Hypercholesterolemia      ENDO (within normal limits)      GI/HEPATIC (within normal limits)      /RENAL   (+) BPH with obstruction/lower urinary tract symptoms      HEMATOLOGY (within normal limits)      MUSCULOSKELETAL (within normal limits)      NEURO/PSYCH (within normal limits)      PULMONARY (within normal limits)      Respiratory   (+) Adenocarcinoma, lung, left (HCC)        Physical Exam    Airway    Mallampati score: I  TM Distance: >3 FB  Neck ROM: full     Dental       Cardiovascular  Rhythm: regular, Rate: normal, Cardiovascular exam normal    Pulmonary  Pulmonary exam normal Breath sounds clear to auscultation,     Other Findings        Anesthesia Plan  ASA Score- 3     Anesthesia Type- general with ASA Monitors  Additional Monitors:   Airway Plan: ETT  Plan Factors-Exercise tolerance (METS): >4 METS  Chart reviewed  EKG reviewed  Imaging results reviewed  Existing labs reviewed  Patient summary reviewed  Patient is not a current smoker  Patient did not smoke on day of surgery  Induction- intravenous  Postoperative Plan-   Planned trial extubation    Informed Consent- Anesthetic plan and risks discussed with patient and spouse  I personally reviewed this patient with the CRNA  Discussed and agreed on the Anesthesia Plan with the CRNA           NPO verified  NKDA  Patient received PO Tylenol and gabapentin preoperatively as ordered by Thoracic Surgery team     Plan:  GETA, ISAIAH    Risks and benefits of general anesthesia were discussed with the patient  Discussed risks of anesthesia including, but not limited to, the risk of dental injury, n/v, sore throat, corneal abrasions, and arrhythmias    All questions were answered  Anesthesia consent was obtained from the patient

## 2023-06-08 ENCOUNTER — HOSPITAL ENCOUNTER (INPATIENT)
Facility: HOSPITAL | Age: 73
LOS: 2 days | Discharge: HOME | DRG: 165 | End: 2023-06-10
Attending: THORACIC SURGERY (CARDIOTHORACIC VASCULAR SURGERY) | Admitting: THORACIC SURGERY (CARDIOTHORACIC VASCULAR SURGERY)
Payer: MEDICARE

## 2023-06-08 ENCOUNTER — ANESTHESIA (OUTPATIENT)
Dept: PERIOP | Facility: HOSPITAL | Age: 73
DRG: 165 | End: 2023-06-08
Payer: MEDICARE

## 2023-06-08 ENCOUNTER — APPOINTMENT (OUTPATIENT)
Dept: RADIOLOGY | Facility: HOSPITAL | Age: 73
DRG: 165 | End: 2023-06-08
Payer: MEDICARE

## 2023-06-08 DIAGNOSIS — C34.92 ADENOCARCINOMA OF LEFT LUNG (HCC): ICD-10-CM

## 2023-06-08 LAB
ABO GROUP BLD: NORMAL
ANION GAP SERPL CALCULATED.3IONS-SCNC: 1 MMOL/L (ref 4–13)
BUN SERPL-MCNC: 20 MG/DL (ref 5–25)
CALCIUM SERPL-MCNC: 8.7 MG/DL (ref 8.3–10.1)
CHLORIDE SERPL-SCNC: 111 MMOL/L (ref 96–108)
CO2 SERPL-SCNC: 28 MMOL/L (ref 21–32)
CREAT SERPL-MCNC: 0.93 MG/DL (ref 0.6–1.3)
ERYTHROCYTE [DISTWIDTH] IN BLOOD BY AUTOMATED COUNT: 15.7 % (ref 11.6–15.1)
GFR SERPL CREATININE-BSD FRML MDRD: 81 ML/MIN/1.73SQ M
GLUCOSE SERPL-MCNC: 161 MG/DL (ref 65–140)
HCT VFR BLD AUTO: 37.6 % (ref 36.5–49.3)
HGB BLD-MCNC: 11.9 G/DL (ref 12–17)
MAGNESIUM SERPL-MCNC: 2.1 MG/DL (ref 1.6–2.6)
MCH RBC QN AUTO: 32.2 PG (ref 26.8–34.3)
MCHC RBC AUTO-ENTMCNC: 31.6 G/DL (ref 31.4–37.4)
MCV RBC AUTO: 102 FL (ref 82–98)
PLATELET # BLD AUTO: 125 THOUSANDS/UL (ref 149–390)
PMV BLD AUTO: 9.1 FL (ref 8.9–12.7)
POTASSIUM SERPL-SCNC: 4.8 MMOL/L (ref 3.5–5.3)
RBC # BLD AUTO: 3.7 MILLION/UL (ref 3.88–5.62)
RH BLD: POSITIVE
SODIUM SERPL-SCNC: 140 MMOL/L (ref 135–147)
WBC # BLD AUTO: 7.86 THOUSAND/UL (ref 4.31–10.16)

## 2023-06-08 PROCEDURE — 83735 ASSAY OF MAGNESIUM: CPT | Performed by: PHYSICIAN ASSISTANT

## 2023-06-08 PROCEDURE — 88313 SPECIAL STAINS GROUP 2: CPT | Performed by: PATHOLOGY

## 2023-06-08 PROCEDURE — 0BTJ4ZZ RESECTION OF LEFT LOWER LUNG LOBE, PERCUTANEOUS ENDOSCOPIC APPROACH: ICD-10-PCS | Performed by: THORACIC SURGERY (CARDIOTHORACIC VASCULAR SURGERY)

## 2023-06-08 PROCEDURE — 0BJ08ZZ INSPECTION OF TRACHEOBRONCHIAL TREE, VIA NATURAL OR ARTIFICIAL OPENING ENDOSCOPIC: ICD-10-PCS | Performed by: THORACIC SURGERY (CARDIOTHORACIC VASCULAR SURGERY)

## 2023-06-08 PROCEDURE — 88309 TISSUE EXAM BY PATHOLOGIST: CPT | Performed by: PATHOLOGY

## 2023-06-08 PROCEDURE — NC001 PR NO CHARGE: Performed by: THORACIC SURGERY (CARDIOTHORACIC VASCULAR SURGERY)

## 2023-06-08 PROCEDURE — 85027 COMPLETE CBC AUTOMATED: CPT | Performed by: PHYSICIAN ASSISTANT

## 2023-06-08 PROCEDURE — 82948 REAGENT STRIP/BLOOD GLUCOSE: CPT

## 2023-06-08 PROCEDURE — 8E0W4CZ ROBOTIC ASSISTED PROCEDURE OF TRUNK REGION, PERCUTANEOUS ENDOSCOPIC APPROACH: ICD-10-PCS | Performed by: THORACIC SURGERY (CARDIOTHORACIC VASCULAR SURGERY)

## 2023-06-08 PROCEDURE — C9290 INJ, BUPIVACAINE LIPOSOME: HCPCS | Performed by: THORACIC SURGERY (CARDIOTHORACIC VASCULAR SURGERY)

## 2023-06-08 PROCEDURE — 32663 THORACOSCOPY W/LOBECTOMY: CPT | Performed by: THORACIC SURGERY (CARDIOTHORACIC VASCULAR SURGERY)

## 2023-06-08 PROCEDURE — 32674 THORACOSCOPY LYMPH NODE EXC: CPT | Performed by: THORACIC SURGERY (CARDIOTHORACIC VASCULAR SURGERY)

## 2023-06-08 PROCEDURE — 71045 X-RAY EXAM CHEST 1 VIEW: CPT

## 2023-06-08 PROCEDURE — 80048 BASIC METABOLIC PNL TOTAL CA: CPT | Performed by: PHYSICIAN ASSISTANT

## 2023-06-08 PROCEDURE — S2900 ROBOTIC SURGICAL SYSTEM: HCPCS | Performed by: THORACIC SURGERY (CARDIOTHORACIC VASCULAR SURGERY)

## 2023-06-08 PROCEDURE — 88312 SPECIAL STAINS GROUP 1: CPT | Performed by: PATHOLOGY

## 2023-06-08 PROCEDURE — 88305 TISSUE EXAM BY PATHOLOGIST: CPT | Performed by: PATHOLOGY

## 2023-06-08 PROCEDURE — 07T74ZZ RESECTION OF THORAX LYMPHATIC, PERCUTANEOUS ENDOSCOPIC APPROACH: ICD-10-PCS | Performed by: THORACIC SURGERY (CARDIOTHORACIC VASCULAR SURGERY)

## 2023-06-08 RX ORDER — FENTANYL CITRATE/PF 50 MCG/ML
25 SYRINGE (ML) INJECTION
Status: DISCONTINUED | OUTPATIENT
Start: 2023-06-08 | End: 2023-06-08 | Stop reason: HOSPADM

## 2023-06-08 RX ORDER — ONDANSETRON 2 MG/ML
4 INJECTION INTRAMUSCULAR; INTRAVENOUS ONCE AS NEEDED
Status: DISCONTINUED | OUTPATIENT
Start: 2023-06-08 | End: 2023-06-08 | Stop reason: HOSPADM

## 2023-06-08 RX ORDER — ROCURONIUM BROMIDE 10 MG/ML
INJECTION, SOLUTION INTRAVENOUS AS NEEDED
Status: DISCONTINUED | OUTPATIENT
Start: 2023-06-08 | End: 2023-06-08

## 2023-06-08 RX ORDER — ENOXAPARIN SODIUM 100 MG/ML
40 INJECTION SUBCUTANEOUS DAILY
Status: DISCONTINUED | OUTPATIENT
Start: 2023-06-08 | End: 2023-06-10 | Stop reason: HOSPADM

## 2023-06-08 RX ORDER — HEPARIN SODIUM 5000 [USP'U]/ML
5000 INJECTION, SOLUTION INTRAVENOUS; SUBCUTANEOUS
Status: COMPLETED | OUTPATIENT
Start: 2023-06-08 | End: 2023-06-08

## 2023-06-08 RX ORDER — SENNOSIDES 8.6 MG
1 TABLET ORAL DAILY
Status: DISCONTINUED | OUTPATIENT
Start: 2023-06-08 | End: 2023-06-10 | Stop reason: HOSPADM

## 2023-06-08 RX ORDER — LIDOCAINE HYDROCHLORIDE 10 MG/ML
INJECTION, SOLUTION EPIDURAL; INFILTRATION; INTRACAUDAL; PERINEURAL AS NEEDED
Status: DISCONTINUED | OUTPATIENT
Start: 2023-06-08 | End: 2023-06-08

## 2023-06-08 RX ORDER — LIDOCAINE HYDROCHLORIDE 10 MG/ML
0.5 INJECTION, SOLUTION EPIDURAL; INFILTRATION; INTRACAUDAL; PERINEURAL ONCE AS NEEDED
Status: DISCONTINUED | OUTPATIENT
Start: 2023-06-08 | End: 2023-06-08 | Stop reason: HOSPADM

## 2023-06-08 RX ORDER — PREDNISONE 20 MG/1
20 TABLET ORAL DAILY
Status: DISCONTINUED | OUTPATIENT
Start: 2023-06-08 | End: 2023-06-09

## 2023-06-08 RX ORDER — PANTOPRAZOLE SODIUM 40 MG/1
40 TABLET, DELAYED RELEASE ORAL
Status: DISCONTINUED | OUTPATIENT
Start: 2023-06-09 | End: 2023-06-10 | Stop reason: HOSPADM

## 2023-06-08 RX ORDER — ACETAMINOPHEN 325 MG/1
975 TABLET ORAL ONCE
Status: COMPLETED | OUTPATIENT
Start: 2023-06-08 | End: 2023-06-08

## 2023-06-08 RX ORDER — OXYCODONE HYDROCHLORIDE 5 MG/1
5 TABLET ORAL EVERY 4 HOURS PRN
Status: DISCONTINUED | OUTPATIENT
Start: 2023-06-08 | End: 2023-06-10 | Stop reason: HOSPADM

## 2023-06-08 RX ORDER — HYDROMORPHONE HCL/PF 1 MG/ML
0.5 SYRINGE (ML) INJECTION
Status: DISCONTINUED | OUTPATIENT
Start: 2023-06-08 | End: 2023-06-10 | Stop reason: HOSPADM

## 2023-06-08 RX ORDER — SODIUM CHLORIDE 9 MG/ML
INJECTION, SOLUTION INTRAVENOUS CONTINUOUS PRN
Status: DISCONTINUED | OUTPATIENT
Start: 2023-06-08 | End: 2023-06-08

## 2023-06-08 RX ORDER — ACETAMINOPHEN 325 MG/1
975 TABLET ORAL EVERY 6 HOURS
Status: DISCONTINUED | OUTPATIENT
Start: 2023-06-08 | End: 2023-06-10 | Stop reason: HOSPADM

## 2023-06-08 RX ORDER — GABAPENTIN 300 MG/1
300 CAPSULE ORAL ONCE
Status: COMPLETED | OUTPATIENT
Start: 2023-06-08 | End: 2023-06-08

## 2023-06-08 RX ORDER — ONDANSETRON 2 MG/ML
INJECTION INTRAMUSCULAR; INTRAVENOUS AS NEEDED
Status: DISCONTINUED | OUTPATIENT
Start: 2023-06-08 | End: 2023-06-08

## 2023-06-08 RX ORDER — SODIUM CHLORIDE, SODIUM LACTATE, POTASSIUM CHLORIDE, CALCIUM CHLORIDE 600; 310; 30; 20 MG/100ML; MG/100ML; MG/100ML; MG/100ML
100 INJECTION, SOLUTION INTRAVENOUS CONTINUOUS
Status: DISCONTINUED | OUTPATIENT
Start: 2023-06-08 | End: 2023-06-08

## 2023-06-08 RX ORDER — PROPOFOL 10 MG/ML
INJECTION, EMULSION INTRAVENOUS AS NEEDED
Status: DISCONTINUED | OUTPATIENT
Start: 2023-06-08 | End: 2023-06-08

## 2023-06-08 RX ORDER — MAGNESIUM HYDROXIDE 1200 MG/15ML
LIQUID ORAL AS NEEDED
Status: DISCONTINUED | OUTPATIENT
Start: 2023-06-08 | End: 2023-06-08 | Stop reason: HOSPADM

## 2023-06-08 RX ORDER — DOCUSATE SODIUM 100 MG/1
100 CAPSULE, LIQUID FILLED ORAL 2 TIMES DAILY
Status: DISCONTINUED | OUTPATIENT
Start: 2023-06-08 | End: 2023-06-10 | Stop reason: HOSPADM

## 2023-06-08 RX ORDER — SODIUM CHLORIDE, SODIUM LACTATE, POTASSIUM CHLORIDE, CALCIUM CHLORIDE 600; 310; 30; 20 MG/100ML; MG/100ML; MG/100ML; MG/100ML
60 INJECTION, SOLUTION INTRAVENOUS CONTINUOUS
Status: DISCONTINUED | OUTPATIENT
Start: 2023-06-08 | End: 2023-06-09

## 2023-06-08 RX ORDER — FENTANYL CITRATE 50 UG/ML
INJECTION, SOLUTION INTRAMUSCULAR; INTRAVENOUS AS NEEDED
Status: DISCONTINUED | OUTPATIENT
Start: 2023-06-08 | End: 2023-06-08

## 2023-06-08 RX ORDER — HYDROMORPHONE HCL/PF 1 MG/ML
0.25 SYRINGE (ML) INJECTION
Status: DISCONTINUED | OUTPATIENT
Start: 2023-06-08 | End: 2023-06-08 | Stop reason: HOSPADM

## 2023-06-08 RX ORDER — POLYETHYLENE GLYCOL 3350 17 G/17G
17 POWDER, FOR SOLUTION ORAL DAILY PRN
Status: DISCONTINUED | OUTPATIENT
Start: 2023-06-08 | End: 2023-06-10 | Stop reason: HOSPADM

## 2023-06-08 RX ORDER — LIDOCAINE HYDROCHLORIDE 20 MG/ML
INJECTION, SOLUTION EPIDURAL; INFILTRATION; INTRACAUDAL; PERINEURAL AS NEEDED
Status: DISCONTINUED | OUTPATIENT
Start: 2023-06-08 | End: 2023-06-08

## 2023-06-08 RX ORDER — DEXAMETHASONE SODIUM PHOSPHATE 10 MG/ML
INJECTION, SOLUTION INTRAMUSCULAR; INTRAVENOUS AS NEEDED
Status: DISCONTINUED | OUTPATIENT
Start: 2023-06-08 | End: 2023-06-08

## 2023-06-08 RX ORDER — ONDANSETRON 2 MG/ML
4 INJECTION INTRAMUSCULAR; INTRAVENOUS EVERY 6 HOURS PRN
Status: DISCONTINUED | OUTPATIENT
Start: 2023-06-08 | End: 2023-06-10 | Stop reason: HOSPADM

## 2023-06-08 RX ORDER — CEFAZOLIN SODIUM 1 G/50ML
1000 SOLUTION INTRAVENOUS ONCE
Status: COMPLETED | OUTPATIENT
Start: 2023-06-08 | End: 2023-06-08

## 2023-06-08 RX ORDER — GABAPENTIN 100 MG/1
100 CAPSULE ORAL 3 TIMES DAILY
Status: DISCONTINUED | OUTPATIENT
Start: 2023-06-08 | End: 2023-06-10 | Stop reason: HOSPADM

## 2023-06-08 RX ADMIN — ROCURONIUM BROMIDE 10 MG: 10 INJECTION, SOLUTION INTRAVENOUS at 14:27

## 2023-06-08 RX ADMIN — PROPOFOL 150 MG: 10 INJECTION, EMULSION INTRAVENOUS at 12:08

## 2023-06-08 RX ADMIN — SODIUM CHLORIDE, SODIUM LACTATE, POTASSIUM CHLORIDE, AND CALCIUM CHLORIDE 60 ML/HR: .6; .31; .03; .02 INJECTION, SOLUTION INTRAVENOUS at 22:39

## 2023-06-08 RX ADMIN — ROCURONIUM BROMIDE 20 MG: 10 INJECTION, SOLUTION INTRAVENOUS at 12:51

## 2023-06-08 RX ADMIN — ROCURONIUM BROMIDE 10 MG: 10 INJECTION, SOLUTION INTRAVENOUS at 14:13

## 2023-06-08 RX ADMIN — LIDOCAINE HYDROCHLORIDE 70 MG: 10 INJECTION, SOLUTION EPIDURAL; INFILTRATION; INTRACAUDAL; PERINEURAL at 12:08

## 2023-06-08 RX ADMIN — GABAPENTIN 300 MG: 300 CAPSULE ORAL at 10:23

## 2023-06-08 RX ADMIN — CEFAZOLIN SODIUM 1000 MG: 1 SOLUTION INTRAVENOUS at 12:15

## 2023-06-08 RX ADMIN — ROCURONIUM BROMIDE 10 MG: 10 INJECTION, SOLUTION INTRAVENOUS at 13:35

## 2023-06-08 RX ADMIN — FENTANYL CITRATE 25 MCG: 50 INJECTION, SOLUTION INTRAMUSCULAR; INTRAVENOUS at 15:40

## 2023-06-08 RX ADMIN — ROCURONIUM BROMIDE 10 MG: 10 INJECTION, SOLUTION INTRAVENOUS at 14:52

## 2023-06-08 RX ADMIN — HEPARIN SODIUM 5000 UNITS: 5000 INJECTION INTRAVENOUS; SUBCUTANEOUS at 10:23

## 2023-06-08 RX ADMIN — SENNOSIDES 8.6 MG: 8.6 TABLET, FILM COATED ORAL at 19:17

## 2023-06-08 RX ADMIN — HYDROMORPHONE HYDROCHLORIDE 0.25 MG: 1 INJECTION, SOLUTION INTRAMUSCULAR; INTRAVENOUS; SUBCUTANEOUS at 15:59

## 2023-06-08 RX ADMIN — SUGAMMADEX 200 MG: 100 INJECTION, SOLUTION INTRAVENOUS at 15:25

## 2023-06-08 RX ADMIN — GABAPENTIN 100 MG: 100 CAPSULE ORAL at 21:46

## 2023-06-08 RX ADMIN — FENTANYL CITRATE 25 MCG: 50 INJECTION, SOLUTION INTRAMUSCULAR; INTRAVENOUS at 13:53

## 2023-06-08 RX ADMIN — FENTANYL CITRATE 50 MCG: 50 INJECTION, SOLUTION INTRAMUSCULAR; INTRAVENOUS at 15:30

## 2023-06-08 RX ADMIN — FENTANYL CITRATE 25 MCG: 50 INJECTION, SOLUTION INTRAMUSCULAR; INTRAVENOUS at 12:08

## 2023-06-08 RX ADMIN — ACETAMINOPHEN 975 MG: 325 TABLET, FILM COATED ORAL at 10:23

## 2023-06-08 RX ADMIN — DEXAMETHASONE SODIUM PHOSPHATE 10 MG: 10 INJECTION, SOLUTION INTRAMUSCULAR; INTRAVENOUS at 12:39

## 2023-06-08 RX ADMIN — HYDROMORPHONE HYDROCHLORIDE 0.25 MG: 1 INJECTION, SOLUTION INTRAMUSCULAR; INTRAVENOUS; SUBCUTANEOUS at 15:54

## 2023-06-08 RX ADMIN — PHENYLEPHRINE HYDROCHLORIDE 25 MCG/MIN: 10 INJECTION INTRAVENOUS at 12:45

## 2023-06-08 RX ADMIN — ONDANSETRON 4 MG: 2 INJECTION INTRAMUSCULAR; INTRAVENOUS at 14:56

## 2023-06-08 RX ADMIN — ACETAMINOPHEN 975 MG: 325 TABLET, FILM COATED ORAL at 21:46

## 2023-06-08 RX ADMIN — ROCURONIUM BROMIDE 50 MG: 10 INJECTION, SOLUTION INTRAVENOUS at 12:09

## 2023-06-08 RX ADMIN — SODIUM CHLORIDE, SODIUM LACTATE, POTASSIUM CHLORIDE, AND CALCIUM CHLORIDE 100 ML/HR: .6; .31; .03; .02 INJECTION, SOLUTION INTRAVENOUS at 10:30

## 2023-06-08 RX ADMIN — SODIUM CHLORIDE: 9 INJECTION, SOLUTION INTRAVENOUS at 12:33

## 2023-06-08 NOTE — OP NOTE
OPERATIVE REPORT  PATIENT NAME: Reynaldo Carver    :  1950  MRN: [id number]  Pt Location: BE OR ROOM 14    SURGERY DATE: 2023    Surgeon(s) and Role:     * Hallie Mtz MD - Primary     * Sheila Ewing PA-C - Assisting    Preop Diagnosis:  Adenocarcinoma of left lung ([address] ) [C34 92]    Post-Op Diagnosis Codes:     * Adenocarcinoma of left lung ([address] ) [C34 92]    Procedure(s):  Left - Left robotic assisted lower lobectomy w/ mediastinal lymph node dissection  BRONCHOSCOPY FLEXIBLE  1  Robotic left lower lobectomy  2  Mediastinal lymph node dissection  3  Bronchoscopy  4  Left T3-T10 intercostal nerve block with Exparel    Specimen(s):  ID Type Source Tests Collected by Time Destination   1 : level 9 #1 Tissue Lymph Node TISSUE EXAM Hallie Mtz MD 2023 1252    2 : level 8 Tissue Lymph Node TISSUE EXAM Hallie Mtz MD 2023 1253    3 : level 8 #2 Tissue Lymph Node TISSUE EXAM Hallie Mtz MD 2023 1254    4 : level 9 #2 Tissue Lymph Node TISSUE EXAM Hallie Mtz MD 2023 1255    5 : level 9 #3 Tissue Lymph Node TISSUE EXAM Hallie Mtz MD 2023 1256    6 : level 9 #4 Tissue Lymph Node TISSUE EXAM Hallie Mtz MD 2023 1257    7 : level 9 #5 Tissue Lymph Node TISSUE EXAM Hallie Mtz MD 2023 1258    8 : level 7 #1 Tissue Lymph Node TISSUE Fabiola Andersen MD 2023 1303    9 : level 7 #2 Tissue Lymph Node TISSUE Fabiola Andersen MD 2023 1306    10 : level 6 Tissue Lymph Node TISSUE Fabiola Andersen MD 2023 1313    11 : level 11 Tissue Lymph Node TISSUE EXAM Hallie Mtz MD 2023 1320    12 : level 11 #2 Tissue Lymph Node TISSUE Fabiola Andersen MD 2023 1335    13 : level 11 #3 Tissue Lymph Node TISSUE Fabiola Andersen MD 2023 1352    14 : level 12 Tissue Lymph Node TISSUE EXAM Hallie Mtz MD 2023 3603    15 :   Tissue Lung, Left Upper Lobe TISSUE EXAM Chuckie Crigler Santa White MD 6/8/2023 1455        Estimated Blood Loss:   Minimal    Drains:  Chest Tube 1 Left Pleural 28 Fr  (Active)   Function Other (Comment) 06/08/23 1800   Chest Tube Air Leak No 06/08/23 1800   Drainage Description Sanguineous 06/08/23 1800   Dressing Status Clean;Dry; Intact 06/08/23 1800   Site Assessment Unable to assess 06/08/23 1545   Surrounding Skin Dry; Intact 06/08/23 1800   Number of days: 0       Urethral Catheter Non-latex 16 Fr  (Active)   Site Assessment Clean;Skin intact 06/08/23 1545   Collection Container Standard drainage bag 06/08/23 1800   Securement Method Securing device (Describe) 06/08/23 1800   Number of days: 0       Anesthesia Type:   General    Operative Indications:  Adenocarcinoma of left lung ([facility] [address] ) []  77-year-old male with stage II left lower lobe adenocarcinoma status post induction chemotherapy with no evidence of distant spread here for surgical resection    Operative Findings:  No evidence of chest wall invasion  Performed complete left lower lobectomy without issue  Complications:   None    Procedure and Technique:  After obtaining informed consent from the patient, they were transferred to the operating room and placed supine on the operating table  Bilateral SCDs were placed and turned on prior to induction of general anesthesia  General anesthesia was then induced and a double-lumen endotracheal tube was placed without incident  Positioning of the double-lumen tube was verified with a pediatric bronchoscope  The patient was then positioned in a right lateral decubitus position with the table fully flexed  The right arm was secured to an armboard and the left arm was positioned safely in a arm simental  Rolled blankets were used for support anterior and posterior and pillows were placed between her legs  All bony prominences were padded and checked  Positioning of double-lumen endotracheal tube was verified at this time      Next a formal time-out was called verifying patient , date of birth, procedure, consent, antibiotics, beta-blocker as indicated, anticipated specimens with handling, SCD use and other special considerations  A bronchoscopy was performed through the double-lumen endotracheal tube  There were no endobronchial masses or obstructions identified  All secretions were suctioned out and positioning of the double lumen tube was again verified  There was no suspicion or identified risk for TB or other air board infectious disease  This bronchoscopy was being performed for diagnostic purposes only  The left chest was then prepped and draped in the standard sterile fashion  Anesthesia clamped and placed suction to the left lobe at this time to help desufflate  Bony landmarks were identified and planned incisions were mapped out  An 8 mm incision was made in the posterior axillary line 8th interspace for the camera port  The camera was inserted verifying that we are in the thoracic cavity and insufflation was initiated to 8 mm of mercury  A thorough thoracoscopy was carried out at this time  There were no significant adhesions  Next an intercostal nerve block was performed using 60 mL of a mixture 1/2 percent Marcaine, normal saline and liposomal bupivacaine (Exparel)  Rib spaces 2 through 10 were identified and using a percutaneous approach a block was placed into each of these ribs spaces with approximately 5mL of this mixture  The remainder of the robotic ports were placed at this time  An 8 mm incision was made as far posterior as possible in the 8th interspace in the posterior port was placed under direct visualization  An 8 mm incision was made have was made FCI in between the camera and arm 4 port and this port was placed under direct visualization  Anteriorly a 12 mm incision was made as anterior and inferior as possible along the costal margin with the insertion point just above the diaphragm    The robotic stapling port was placed through this under direct visualization  Finally a 15 mm incision was made and a 15 mm assistant port was made group home between the camera and arm 1 and as inferior as possible staying above the diaphragm  The robot was docked at this time  A caudier forceps was inserted into arm 1, [place] bipolar grasper into arm 3 and tip up fenestrated grasper into arm 4  I un-scrubbed at this time and went to the console  We began by taking down the inferior pulmonary ligament with bipolar cautery  This dissection was continued towards the hilum to the level of the inferior pulmonary vein  We looked for any possible level 8 or level 9 lymph nodes  There were numerous level 8 and level 9 lymph nodes that were removed separately and sent individually for permanent pathology  This dissection was continued anteriorly clearing off the anterior aspect of the inferior pulmonary vein to the level of the fissure  Next we reflected the lung anteriorly and continued our posterior pleural dissection moving towards the apex of the hilum  The lower lobe and upper lobe were cleared off of the bronchus and pulmonary artery  Next we identified and dissected out our level 7 lymph node packet  This was removed and sent for permanent pathology  Retracting the lung inferiorly we identified the level 6 node and dissected this out, sending for permanent pathology  There is no significant 5 lymph node packet    We then went to the fissure,  the upper and lower lobes  The fissure was partially complete but need to be fully  with bipolar cautery  Staying in the thin pleural tissue we were able to fully separate the upper and lower lobes dissecting down onto the main pulmonary artery  We stayed on the plane of the pulmonary artery using anteriorly until we were able to clearly identify the left upper lobe lingular branch  There were multiple large basilar branches running along the fissure    We made certain to spare both the lingular branch and these basilar branches  We dissected in the lung parenchyma between these and completed the fissure anteriorly with a robotic 45 mm blue load staple firing  We then started to dissect out the individual left lower lobe basilar arterial branches based on their trajectory  These were encircled and stapled with a robotic 30 mm gray load staple firings  We dissected out any lymph nodes on the PA and sent these for permanent pathology  Ultimately dissected 3 separate basilar branches and stapled with finally the septal posterior basilar branch as well as superior segmental artery together with a 30 mm gray load firing    We then dissected out the inferior pulmonary vein  There is a separate superior pulmonary vein  This was stapled with a robotic 30 mm gray load staple firing  We then fully dissected out the left lower lobe bronchus  We made certain to clear off all nodes and sent these for permanent pathology  We made certain all suction catheters were out the airway  This was stapled with a robotic 45 mm green load staple firing  The left lower lobe specimen was placed in a 15 mm thoracoscopic anchor bag and removed through the assistant port  This was sent for permanent pathology  The left chest was thoroughly explored, irrigated and aspirated dry  There was no appreciable bleeding seen  All robotic ports were removed under direct visualization and inspected for bleeding  Once satisfied with hemostasis, a 29 Telugu straight chest tube was placed through the camera port and the left upper lobe was then insufflated by anesthesia  This came up without incident and the camera was removed at this time  The 28 Telugu chest tube was secured to the chest wall with an 0 Prolene    An 0 Prolene U-stitch was also placed for help closing this incision following chest tube removal  The assistant port and specimen removal site was closed with running 0 Vicryl in the deep layers, 3-0 Vicryl in the soft tissue and 4 0 Monocryl for skin  The remainder of the ports were closed with interrupted 3-0 Vicryl and 4 Monocryl  Surgical glue was applied to all incisions  The chest tube was placed to a [place] Pleural drainage device to water seal   The initial airleak output was 0 mL per minute  The patient extubated in the operating room and was transferred safely to the stretcher  There were transferred to the PACU in stable condition  JOSE ALFREDO Dotson was scrubbed as a bedside assistant for the entirety of the procedure as no other qualified assistant or general surgery resident was available  She was critical to the performance of this operation as she was the bedside robotic assistant  In doing so, she aided with retraction, specimen removal, irrigation, suctioning and instrument exchange  I was present for the entire procedure , A qualified resident physician was not available  and A physician assistant was required during the procedure for retraction, tissue handling, dissection and suturing      Patient Disposition:  PACU         SIGNATURE: Ozzy Davis MD  DATE: June 8, 2023  TIME: 7:20 PM

## 2023-06-09 LAB
ANION GAP SERPL CALCULATED.3IONS-SCNC: 0 MMOL/L (ref 4–13)
BUN SERPL-MCNC: 15 MG/DL (ref 5–25)
CALCIUM SERPL-MCNC: 8.6 MG/DL (ref 8.3–10.1)
CHLORIDE SERPL-SCNC: 111 MMOL/L (ref 96–108)
CO2 SERPL-SCNC: 28 MMOL/L (ref 21–32)
CREAT SERPL-MCNC: 0.78 MG/DL (ref 0.6–1.3)
ERYTHROCYTE [DISTWIDTH] IN BLOOD BY AUTOMATED COUNT: 15.2 % (ref 11.6–15.1)
GFR SERPL CREATININE-BSD FRML MDRD: 90 ML/MIN/1.73SQ M
GLUCOSE SERPL-MCNC: 162 MG/DL (ref 65–140)
GLUCOSE SERPL-MCNC: 270 MG/DL (ref 65–140)
HCT VFR BLD AUTO: 36.5 % (ref 36.5–49.3)
HGB BLD-MCNC: 11.4 G/DL (ref 12–17)
MAGNESIUM SERPL-MCNC: 2.4 MG/DL (ref 1.6–2.6)
MCH RBC QN AUTO: 31.1 PG (ref 26.8–34.3)
MCHC RBC AUTO-ENTMCNC: 31.2 G/DL (ref 31.4–37.4)
MCV RBC AUTO: 100 FL (ref 82–98)
PLATELET # BLD AUTO: 137 THOUSANDS/UL (ref 149–390)
PMV BLD AUTO: 9.4 FL (ref 8.9–12.7)
POTASSIUM SERPL-SCNC: 4.5 MMOL/L (ref 3.5–5.3)
RBC # BLD AUTO: 3.67 MILLION/UL (ref 3.88–5.62)
SODIUM SERPL-SCNC: 139 MMOL/L (ref 135–147)
WBC # BLD AUTO: 8 THOUSAND/UL (ref 4.31–10.16)

## 2023-06-09 PROCEDURE — 99024 POSTOP FOLLOW-UP VISIT: CPT | Performed by: THORACIC SURGERY (CARDIOTHORACIC VASCULAR SURGERY)

## 2023-06-09 PROCEDURE — 97166 OT EVAL MOD COMPLEX 45 MIN: CPT

## 2023-06-09 PROCEDURE — 83735 ASSAY OF MAGNESIUM: CPT | Performed by: PHYSICIAN ASSISTANT

## 2023-06-09 PROCEDURE — 97110 THERAPEUTIC EXERCISES: CPT

## 2023-06-09 PROCEDURE — 80048 BASIC METABOLIC PNL TOTAL CA: CPT | Performed by: PHYSICIAN ASSISTANT

## 2023-06-09 PROCEDURE — 85027 COMPLETE CBC AUTOMATED: CPT | Performed by: PHYSICIAN ASSISTANT

## 2023-06-09 PROCEDURE — 97163 PT EVAL HIGH COMPLEX 45 MIN: CPT

## 2023-06-09 RX ORDER — ACETAMINOPHEN 325 MG/1
650 TABLET ORAL EVERY 6 HOURS
Qty: 56 TABLET | Refills: 0 | Status: SHIPPED | OUTPATIENT
Start: 2023-06-09 | End: 2023-06-16

## 2023-06-09 RX ORDER — GABAPENTIN 100 MG/1
100 CAPSULE ORAL 3 TIMES DAILY
Qty: 63 CAPSULE | Refills: 0 | Status: SHIPPED | OUTPATIENT
Start: 2023-06-09

## 2023-06-09 RX ORDER — OXYCODONE HYDROCHLORIDE 5 MG/1
5 TABLET ORAL EVERY 4 HOURS PRN
Qty: 30 TABLET | Refills: 0 | Status: SHIPPED | OUTPATIENT
Start: 2023-06-09 | End: 2023-06-19

## 2023-06-09 RX ORDER — DOCUSATE SODIUM 100 MG/1
100 CAPSULE, LIQUID FILLED ORAL 2 TIMES DAILY
Qty: 60 CAPSULE | Refills: 0 | Status: SHIPPED | OUTPATIENT
Start: 2023-06-09 | End: 2023-07-09

## 2023-06-09 RX ADMIN — ACETAMINOPHEN 975 MG: 325 TABLET, FILM COATED ORAL at 21:00

## 2023-06-09 RX ADMIN — GABAPENTIN 100 MG: 100 CAPSULE ORAL at 09:15

## 2023-06-09 RX ADMIN — ENOXAPARIN SODIUM 40 MG: 40 INJECTION SUBCUTANEOUS at 09:16

## 2023-06-09 RX ADMIN — GABAPENTIN 100 MG: 100 CAPSULE ORAL at 20:50

## 2023-06-09 RX ADMIN — OXYCODONE HYDROCHLORIDE 5 MG: 5 TABLET ORAL at 15:11

## 2023-06-09 RX ADMIN — GABAPENTIN 100 MG: 100 CAPSULE ORAL at 15:11

## 2023-06-09 RX ADMIN — ACETAMINOPHEN 975 MG: 325 TABLET, FILM COATED ORAL at 05:58

## 2023-06-09 RX ADMIN — SENNOSIDES 8.6 MG: 8.6 TABLET, FILM COATED ORAL at 09:15

## 2023-06-09 RX ADMIN — DOCUSATE SODIUM 100 MG: 100 CAPSULE, LIQUID FILLED ORAL at 09:15

## 2023-06-09 RX ADMIN — ACETAMINOPHEN 975 MG: 325 TABLET, FILM COATED ORAL at 15:11

## 2023-06-09 RX ADMIN — PANTOPRAZOLE SODIUM 40 MG: 40 TABLET, DELAYED RELEASE ORAL at 05:59

## 2023-06-09 RX ADMIN — DOCUSATE SODIUM 100 MG: 100 CAPSULE, LIQUID FILLED ORAL at 18:04

## 2023-06-09 NOTE — PHYSICAL THERAPY NOTE
Physical Therapy Evaluation     Patient's Name: Kaylin Ortez    Admitting Diagnosis  Adenocarcinoma of left lung [facility]) [C34 92]    Problem List  Patient Active Problem List   Diagnosis    Negative depression screening    Hypercholesterolemia    Adenocarcinoma, lung, left ([facility] [address] )    Platelets decreased ([facility] [address] )    BPH with obstruction/lower urinary tract symptoms       Past Medical History  Past Medical History:   Diagnosis Date    BPH with obstruction/lower urinary tract symptoms     Cystitis, chronic     Epididymo-orchitis     Feeling of incomplete bladder emptying     Frequency of micturition     Nocturia     Other microscopic hematuria     Poor urinary stream     Simple renal cyst        Past Surgical History  Past Surgical History:   Procedure Laterality Date    CYSTOSCOPY  2012    FRACTURE SURGERY      legs, arms, shoulder, back after accident    IR BIOPSY LUNG  02/23/2023 06/09/23 0926   PT Last Visit   PT Visit Date 06/09/23   Note Type   Note type Evaluation  (and Tx)   Pain Assessment   Pain Assessment Tool 0-10   Pain Score No Pain   Restrictions/Precautions   Braces or Orthoses   (denies)   Other Precautions Multiple lines;Telemetry  (CT)   Home Living   Type of Home House   Home Layout Two level  (21 FARTUN w/o hand rail)   Prior Function   Level of [place] Independent with functional mobility  (amb w/o AD)   Lives With Spouse   Vocational Part time employment   General   Additional Pertinent History cleared for assessment by nsg   Family/Caregiver Present Yes   Cognition   Overall Cognitive Status WFL   Arousal/Participation Alert   Attention Within functional limits   Orientation Level Oriented to person;Oriented to place;Oriented to situation   Memory Within functional limits   Following Commands Follows one step commands without difficulty   Subjective   Subjective Alert; in the chair; agreeable to mobilize   RUE Assessment   RUE Assessment WFL  (AROM)   LUE Assessment   LUE Assessment WFL  (AROM)   RLE Assessment   RLE Assessment WFL  (AROM)   Strength RLE   RLE Overall Strength   (good -)   LLE Assessment   LLE Assessment WFL  (AROM)   Strength LLE   LLE Overall Strength   (good -)   Transfers   Sit to Stand 5  Supervision   Stand to Sit 5  Supervision   Ambulation/Elevation   Gait pattern Short stride; Inconsistent alison   Gait Assistance 5  Supervision  (after initial min (A))   Additional items Verbal cues  (pacing)   Assistive Device None   Distance 2 x 120 ft w/ standing rest stop in between   Stair Management Assistance Not tested   Balance   Static Sitting Good   Dynamic Sitting Fair +   Static Standing Fair   Dynamic Standing [address], [address] -   Activity Tolerance   Activity Tolerance Patient tolerated treatment well   Nurse Made Aware spoke to [place], [address] [place] [address]   Assessment   Prognosis Good   Problem List Decreased strength;Decreased endurance; Impaired balance;Decreased mobility   Assessment Pt is 67 y o  male admitted with Dx of Adenocarcinoma, lung, left and underwent Robotic left lower lobectomy, Mediastinal lymph node dissection, Bronchoscopy and Left T3-T10 intercostal nerve block with Exparel on 6/8/2023  Pt 's comorbidities affecting POC include:  BPH with obstruction/lower urinary tract symptoms, Cystitis, chronic and personal factors of: multiple FARTUN and working part time  Pt's clinical presentation is currently unstable/unpredictable which is evident in ongoing telem monitoring,, abn lab values and CT in place  Pt presents w/ min overall weakness,  decreased functional endurance and inconsistent amb balance and gait patterns requring close (S) at this time  Will cont to follow pt in PT for progressive mobilization to max level of (I), endurance, and safety   Otherwise, anticipate pt will return home w/ available family support upon D/C provided he cont improving w/ mobility skills, safety, and endurance (steps) and when medically cleared; an OP PT/pulm rehab follow up may need to be considered  Goals   Patient Goals to return home   STG Expiration Date 06/16/23   Short Term Goal #1 5-7 days  Pt will amb 300 ft w/o assistive device, mod (I) in order to facilitate safe return to community amb status  Pt will negotiate 21 steps w/o hand rail, mod (I) in order to navigate in and out of home environment safely  Pt will achieve (I) level w/ bed mob in order to facilitate safety with OOB and back to bed transitions in own living environment  Pt will perform transfers w/ mod (I) to assure (I) and safety w/ functional mobility/transitions w/ all aspects of mobility/locomotion  Pt will participate in LE therex and balance activities to max progression w/ mobility skills  PT Treatment Day 0   Plan   Treatment/Interventions Functional transfer training;LE strengthening/ROM; Elevations; Therapeutic exercise; Endurance training;Bed mobility;Gait training;Spoke to nursing;Spoke to case management   PT Frequency Other (Comment)  (3-6x/wk)   Recommendation   PT Discharge Recommendation Home with outpatient rehabilitation   AM-PAC Basic Mobility Inpatient   Turning in Flat Bed Without Bedrails 4   Lying on Back to Sitting on Edge of Flat Bed Without Bedrails 4   Moving Bed to Chair 3   Standing Up From Chair Using Arms 3   Walk in Room 3   Climb 3-5 Stairs With Railing 2   Basic Mobility Inpatient Raw Score 19   Basic Mobility Standardized Score 42 48   Highest Level Of Mobility   JH-HLM Goal 6: Walk 10 steps or more   JH-HLM Achieved 8: Walk 250 feet ot more   Modified [place] Scale   Modified [place] Scale 3   Additional Treatment Session   Start Time 2134   End Time 0936   Treatment Assessment Additional follow up consecutive session performed to initiate LE therex in order to facilitate progression w/ functional mobility skills and overall level of (I)  Additional Treatment Day 1   Exercises   Knee AROM Long Arc Quad Sitting;10 reps;AROM; Bilateral  (2 sets; HEP)   Ankle Pumps Sitting;10 reps;AROM; Bilateral  (2 sets; HEP)   Marching Sitting;10 reps;AROM; Bilateral  (2 sets; HEP)   End of Consult   Patient Position at End of Consult Bedside chair; All needs within reach           [facility],

## 2023-06-09 NOTE — DISCHARGE INSTR - AVS FIRST PAGE
Gently wash your incisions daily with soap and water, do not soak in a tub  Do not apply any lotions, creams, or ointments to incisions  No lifting over 10 lbs or strenuous exercise  No driving until seen at your post operative visit  The blue stitch will be removed at your post operative visit  Please obtain a pa/lat chest xray at a [facility] within 3 days of your follow up visit  Please call the office first if you have any questions or concerns during your post op period, prior to going to the emergency room or urgent care  You will be prescribed 2 medications to take at home, that should be taken exactly as directed  These have been shown to greatly improve post-operative pain:     Gabapentin 300mg tablet  Take 1 tablet by mouth 3x a day for 3 weeks  2    Tylenol 325mg tablet  Take 2 tablets by mouth, every 6 hours, for 1 week  You will also be prescribed a medication that you may take on an as needed basis:  Oxycodone 5mg tablet  Take 1-2 tablets every 4 hours as needed for pain
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
## 2023-06-09 NOTE — CASE MANAGEMENT
Case Management Assessment & Discharge Planning Note    Patient name Amanda Walker  Location [facility] 421/[facility] 622-46 MRN [id number]  : 1950 Date 2023       Current Admission Date: 2023  Current Admission Diagnosis:Adenocarcinoma, lung, left [facility])   Patient Active Problem List    Diagnosis Date Noted   • BPH with obstruction/lower urinary tract symptoms    • Platelets decreased ([facility] [address] ) 2023   • Adenocarcinoma, lung, left ([facility] [address] ) 2023   • Hypercholesterolemia 10/31/2020   • Negative depression screening 2019      LOS (days): 1  Geometric Mean LOS (GMLOS) (days): 2 20  Days to GMLOS:1 2     OBJECTIVE:    Risk of Unplanned Readmission Score: 10 34         Current admission status: Inpatient       Preferred Pharmacy:   [address] [place] [address] #[id number] Bucky Abarca, [address]  [address] [place] [id number]  Phone: [phone] Fax: [phone]    Primary Care Provider: Don Hernández DO    Primary Insurance: MEDICARE  Secondary Insurance: AARP    ASSESSMENT:  [address] Proxies    There are no active Health Care Proxies on file  Readmission Root Cause  30 Day Readmission: No    Patient Information  Admitted from[de-identified] Home  Mental Status: Alert  During Assessment patient was accompanied by: Not accompanied during assessment  Assessment information provided by[de-identified] Patient  Primary Caregiver: Self  Support Systems: Spouse/significant other, Self  [place] of Residence: [address] [place] [address] Avenue do you live in?: [address] 017 entry access options   Select all that apply : Stairs  Number of steps to enter home : 3  Type of Current Residence: 2 story home  Upon entering residence, is there a bedroom on the main floor (no further steps)?: No  A bedroom is located on the following floor levels of residence (select all that apply):: 2nd Floor  Upon entering residence, is there a bathroom on the main floor (no further steps)?: Yes  Number of steps to 2nd floor from main floor: One Flight  In the last 12 months, was there a time when you were not able to pay the mortgage or rent on time?: No  In the last 12 months, how many places have you lived?: 1  In the last 12 months, was there a time when you did not have a steady place to sleep or slept in a shelter (including now)?: No  Homeless/housing insecurity resource given?: N/A  Living Arrangements: Lives w/ Spouse/significant other    Activities of Daily Living Prior to Admission  Functional Status: Independent  Completes ADLs independently?: Yes  Ambulates independently?: Yes  Does patient use assisted devices?: No  Does patient currently own DME?: No  Does patient have a history of Outpatient Therapy (PT/OT)?: No  Does the patient have a history of Short-Term Rehab?: No  Does patient have a history of HHC?: No  Does patient currently have [employer] ?: No         Patient Information Continued  Income Source: Pension/shelter  Does patient have prescription coverage?: Yes  Within the past 12 months, you worried that your food would run out before you got the money to buy more : Never true  Within the past 12 months, the food you bought just didn't last and you didn't have money to get more : Never true  Food insecurity resource given?: N/A  Does patient receive dialysis treatments?: No  Does patient have a history of substance abuse?: No  Does patient have a history of Mental Health Diagnosis?: No         Means of Transportation  Means of Transport to Appts[de-identified] Drives Self  In the past 12 months, has lack of transportation kept you from medical appointments or from getting medications?: No  In the past 12 months, has lack of transportation kept you from meetings, work, or from getting things needed for daily living?: No  Was application for public transport provided?: N/A        DISCHARGE DETAILS:    Discharge planning discussed with[de-identified] Pt at bedside  [place] of Choice: Yes       Contacts  Contact Method:  In Person  Reason/Outcome: Continuity of Care, Emergency Contact, Discharge [address]         Is the patient interested in [address] at discharge?: No          Additional Comments: SW Resident met w/ Pt at bedside to discuss his eventual d/c plan  Pt states that he independent w/ ADLS at baseline  Pt drives and doesn't use DME  Pt will likely d/c home w/ no needs  Pts wife will provide transportation upon d/c  CM will remain available

## 2023-06-09 NOTE — PLAN OF CARE
Problem: PHYSICAL THERAPY ADULT  Goal: Performs mobility at highest level of function for planned discharge setting  See evaluation for individualized goals  Description: Treatment/Interventions: Functional transfer training, LE strengthening/ROM, Elevations, Therapeutic exercise, Endurance training, Bed mobility, Gait training, Spoke to nursing, Spoke to case management          See flowsheet documentation for full assessment, interventions and recommendations  Note: Prognosis: Good  Problem List: Decreased strength, Decreased endurance, Impaired balance, Decreased mobility  Assessment: Pt is 67 y o  male admitted with Dx of Adenocarcinoma, lung, left and underwent Robotic left lower lobectomy, Mediastinal lymph node dissection, Bronchoscopy and Left T3-T10 intercostal nerve block with Exparel on 6/8/2023  Pt 's comorbidities affecting POC include:  BPH with obstruction/lower urinary tract symptoms, Cystitis, chronic and personal factors of: multiple FARTUN and working part time  Pt's clinical presentation is currently unstable/unpredictable which is evident in ongoing telem monitoring,, abn lab values and CT in place  Pt presents w/ min overall weakness,  decreased functional endurance and inconsistent amb balance and gait patterns requring close (S) at this time  Will cont to follow pt in PT for progressive mobilization to max level of (I), endurance, and safety  Otherwise, anticipate pt will return home w/ available family support upon D/C provided he cont improving w/ mobility skills, safety, and endurance (steps) and when medically cleared; an OP PT/pulm rehab follow up may need to be considered  PT Discharge Recommendation: Home with outpatient rehabilitation    See flowsheet documentation for full assessment

## 2023-06-09 NOTE — PROGRESS NOTES
"Progress Note - Jody Lorenzo 67 y o  male MRN: [id number]    Unit/Bed#: [facility] 421-01 Encounter: [id number]      Assessment:  68 y/o M w adenocarcinoma L lung s/p robotic L lobectomy on 6/8  CT: WS, 60cc output  No airleak  Plan:  Regular diet  Continue chest tube  Likely remove tomorrow  Dc meng  Pain control  dvt ppx  Pt/ot  dispo planning    Subjective:   Feels great  No complaints  Pulling > 1 2L on IS  Denied fever, chills, chest pain, shortness of breath, nausea, vomiting, or abdominal pain this morning  Objective:     Vitals: Blood pressure 123/76, pulse 68, temperature 98 °F (36 7 °C), temperature source Oral, resp  rate 16, height 5' 10\" (1 778 m), weight 74 8 kg (165 lb), SpO2 97 %  ,Body mass index is 23 68 kg/m²  Intake/Output Summary (Last 24 hours) at 6/9/2023 0604  Last data filed at 6/9/2023 0600  Gross per 24 hour   Intake 2016 ml   Output 1925 ml   Net 91 ml       Physical Exam  General: NAD  HEENT: NC/AT  MMM  Cv: RRR     Lungs: normal effort  Ab: Soft, NT/ND  Ex: no CCE  Neuro: AAOx3    Scheduled Meds:  Current Facility-Administered Medications   Medication Dose Route Frequency Provider Last Rate   • acetaminophen  975 mg Oral Q6H Aparna Melara PA-C     • docusate sodium  100 mg Oral BID Lion White PAAzaelC     • enoxaparin  40 mg Subcutaneous Daily Lion White PA-C     • gabapentin  100 mg Oral TID Lion White PA-C     • HYDROmorphone  0 5 mg Intravenous Q3H PRN Lion White PAKAYE     • lactated ringers  60 mL/hr Intravenous Continuous Lion White PA-C 60 mL/hr (06/08/23 2239)   • ondansetron  4 mg Intravenous Q6H PRN Lion White PAKAYE     • oxyCODONE  5 mg Oral Q4H PRN Lion White PA-CORNELIO     • pantoprazole  40 mg Oral Early Morning Lion White PAKAYE     • polyethylene glycol  17 g Oral Daily PRN Lion White PAKAYE     • predniSONE  20 mg Oral Daily Lion White PA-C     • senna  1 tablet Oral Daily Lion White, MOSES       Continuous Infusions:lactated " ringers, 60 mL/hr, Last Rate: 60 mL/hr (06/08/23 5673)      PRN Meds: •  HYDROmorphone  •  ondansetron  •  oxyCODONE  •  polyethylene glycol      Invasive Devices     Peripheral Intravenous Line  Duration           Peripheral IV 06/08/23 Dorsal (posterior); Right Hand <1 day    Peripheral IV 06/08/23 Left;Dorsal (posterior) Forearm <1 day          Drain  Duration           Chest Tube 1 Left Pleural 28 Fr  <1 day    Urethral Catheter Non-latex 16 Fr  <1 day                Lab, Imaging and other studies: I have personally reviewed pertinent reports      VTE Pharmacologic Prophylaxis: Sequential compression device (Venodyne)   VTE Mechanical Prophylaxis: sequential compression device

## 2023-06-09 NOTE — PLAN OF CARE
"  Problem: OCCUPATIONAL THERAPY ADULT  Goal: Performs self-care activities at highest level of function for planned discharge setting  See evaluation for individualized goals  Description:            See flowsheet documentation for full assessment, interventions and recommendations  Note:    Prognosis: Fair  Assessment: Pt is a pleasant and cooperative 68 yo male admitted to [facility] s/p \"Left robotic assisted lower lobectomy w/ mediastinal lymph node dissection and bronchoscopy flexible\" on 6/8 2* adenocarcinoma of left lung  Pt  has a past medical history of BPH with obstruction/lower urinary tract symptoms, Cystitis, chronic, Epididymo-orchitis, Feeling of incomplete bladder emptying, Frequency of micturition, Nocturia, Other microscopic hematuria, Poor urinary stream, and Simple renal cyst  Pt with active OT orders and OT consulted to assess pt's functional status and occupational performance to determine safe d/c needs  Pt lives with his supportive wife in a 2 SH with 21 FARTUN  PTA, pt was independent in ADLs/IADLs and functional mobility w/ no DME  (+) driving  Discussed role and scope of OT in which pt was receptive  At this time, pt is not demonstrating any significant occupational deficits and is functioning at a level of independent for functional transfers w/ no DME, supervision for functional mobility w/ no DME, and UB/LB ADLs @ an independent level  From an OT standpoint, recommend discharge to home with increased support once medically stable  Pt was receptive regarding education on returning home safely with energy conservation techniques and demonstrated good carryover during occupational/functional performance  At this time, pt demonstrates good insight/safety awareness and does not express any concerns regarding performing ADL/IADL/functional mobility tasks  No further skilled acute care OT services are needed at this time     The patient's raw score on the AM-PAC Daily Activity Inpatient Short Form is " 24  A raw score of greater than or equal to 19 suggests the patient may benefit from discharge to home  Please refer to the recommendation of the Occupational Therapist for safe discharge planning  Recommend continued engagement in ADL/functional mobility tasks with nursing and restorative therapy staff as appropriate to promote the highest level of independence prior to discharge  OT is discharging pt from caseload at this time, please reconsult if needed       OT Discharge Recommendation: No rehabilitation needs

## 2023-06-09 NOTE — PROGRESS NOTES
Surgery  Post op check     66 y/o M w adenocarcinoma L lung s/p robotic L lobectomy on 6/8  Doing well post op  Vss  Afebrile  L CT to waterseal, 50 cc output  No airleak  Saturating 95% on room air       Plan:   Regular diet  Continue CT to waterseal follow output  Pain control   dvt ppx, lovenox    Ulisses Gama MD  6/8/23  11:30 PM

## 2023-06-10 ENCOUNTER — APPOINTMENT (OUTPATIENT)
Dept: RADIOLOGY | Facility: HOSPITAL | Age: 73
DRG: 165 | End: 2023-06-10
Payer: MEDICARE

## 2023-06-10 VITALS
SYSTOLIC BLOOD PRESSURE: 104 MMHG | OXYGEN SATURATION: 94 % | HEART RATE: 80 BPM | BODY MASS INDEX: 22.69 KG/M2 | HEIGHT: 70 IN | DIASTOLIC BLOOD PRESSURE: 75 MMHG | WEIGHT: 158.51 LBS | RESPIRATION RATE: 17 BRPM | TEMPERATURE: 97.8 F

## 2023-06-10 PROCEDURE — 99024 POSTOP FOLLOW-UP VISIT: CPT | Performed by: THORACIC SURGERY (CARDIOTHORACIC VASCULAR SURGERY)

## 2023-06-10 PROCEDURE — 71046 X-RAY EXAM CHEST 2 VIEWS: CPT

## 2023-06-10 PROCEDURE — NC001 PR NO CHARGE: Performed by: THORACIC SURGERY (CARDIOTHORACIC VASCULAR SURGERY)

## 2023-06-10 RX ADMIN — DOCUSATE SODIUM 100 MG: 100 CAPSULE, LIQUID FILLED ORAL at 10:24

## 2023-06-10 RX ADMIN — ACETAMINOPHEN 975 MG: 325 TABLET, FILM COATED ORAL at 10:24

## 2023-06-10 RX ADMIN — ACETAMINOPHEN 975 MG: 325 TABLET, FILM COATED ORAL at 06:04

## 2023-06-10 RX ADMIN — ENOXAPARIN SODIUM 40 MG: 40 INJECTION SUBCUTANEOUS at 10:23

## 2023-06-10 RX ADMIN — OXYCODONE HYDROCHLORIDE 5 MG: 5 TABLET ORAL at 06:04

## 2023-06-10 RX ADMIN — GABAPENTIN 100 MG: 100 CAPSULE ORAL at 10:24

## 2023-06-10 RX ADMIN — PANTOPRAZOLE SODIUM 40 MG: 40 TABLET, DELAYED RELEASE ORAL at 06:04

## 2023-06-10 NOTE — PROGRESS NOTES
"Progress Note - Thoracic Surgery   Marquis Rizo 67 y o  male MRN: [id number]  Unit/Bed#: [facility] 421-01 Encounter: [id number]    Assessment:  67 y o  male with L lower lobe adenocarcinoma s/p robotic left lower lobectomy on 6/8  Afebrile, Stable VS on room air  U O: 2300 L  Chest tube: 300 cc; serosanguinous  Placed to waterseal, no air leak with cough  Having flatus  Having bowel movements    Plan:  DC chest tube today 6/10, obtain CXR after  DC home today   Encourage ambulation, incentive spirometry, coughing, and deep breathing  Subjective/Objective     Subjective:   No acute events overnight  Patient reports mild pain in the L shoulder and chest with deep inspiration  Patient reports pain is well controlled  He has been tolerating his diet and ambulating  He has been using his incentive spirometer up to 2000 mL  He denies nausea, vomiting, shortness of breath, trouble breathing, fevers, chills  Objective:     Blood pressure 118/72, pulse 72, temperature 98 °F (36 7 °C), resp  rate 15, height 5' 10\" (1 778 m), weight 72 9 kg (160 lb 11 5 oz), SpO2 98 %  ,Body mass index is 23 06 kg/m²  Intake/Output Summary (Last 24 hours) at 6/10/2023 0133  Last data filed at 6/9/2023 2201  Gross per 24 hour   Intake 2531 ml   Output 2575 ml   Net -44 ml       Invasive Devices     Peripheral Intravenous Line  Duration           Peripheral IV 06/08/23 Dorsal (posterior); Right Hand 1 day    Peripheral IV 06/08/23 Left;Dorsal (posterior) Forearm 1 day          Drain  Duration           Chest Tube 1 Left Pleural 28 Fr  1 day                Physical Exam:   Gen: NAD, Comfortable  Neuro: A&O, No focal deficits  Head: Normal Cephalic, Atraumatic  Eye: EOMI, PERRLA, No scleral icterus  Neck: Supple, No JVD, Midline trachea  CV: RRR, Cap refill <2 sec  Pulm: Normal work of breathing, no respiratory distress  Abd: Soft, Non-Distended, Non-Tender  Ext: No edema, Non-tender  Skin: warm, dry, intact   Incisions clean, dry, " intact

## 2023-06-10 NOTE — QUICK NOTE
06/10/23    Procedure: Chest tube removal    Left chest tube removed in routine fashion without incident  The patient tolerated the procedure well  A dry, sterile dressing was placed  Will check a pa/lat chest x-ray       Td Guidry PA-C

## 2023-06-10 NOTE — PLAN OF CARE
Problem: RESPIRATORY - ADULT  Goal: Achieves optimal ventilation and oxygenation  Description: INTERVENTIONS:  - Assess for changes in respiratory status  - Assess for changes in mentation and behavior  - Position to facilitate oxygenation and minimize respiratory effort  - Oxygen administered by appropriate delivery if ordered  - Initiate smoking cessation education as indicated  - Encourage broncho-pulmonary hygiene including cough, deep breathe, Incentive Spirometry  - Assess the need for suctioning and aspirate as needed  - Assess and instruct to report SOB or any respiratory difficulty  - Respiratory Therapy support as indicated  6/10/2023 1211 by Shelly Novak RN  Outcome: Completed  6/10/2023 1124 by Shelly Novak RN  Outcome: Progressing

## 2023-06-10 NOTE — DISCHARGE SUMMARY
Discharge Summary -thoracic surgery   Jimmy Mars 67 y o  male MRN: [id number]  Unit/Bed#: [facility] 421-01 Encounter: [id number]    Admission Date: 6/8/2023     Discharge Date: 6/10/2023     Admitting Diagnosis: Adenocarcinoma of left lung [facility]) [C34 92]    Discharge Diagnosis: Principal Problem:    Adenocarcinoma, lung, left ([address] )        Attending and Service:   Jono Roman MD*; thoracic surgery    Consulting Physician(s):   None    Procedures Performed:   6/8/2023:   1  Robotic left lower lobectomy  2  Mediastinal lymph node dissection  3  Bronchoscopy  4  Left T3-T10 intercostal nerve block with Exparel    Imaging:  Procedure: XR chest portable    Result Date: 6/8/2023  Narrative: CHEST INDICATION:   s/p left lower lobectomy  COMPARISON: CXR 2/23/2023, PET/CT 5/23/2023  EXAM PERFORMED/VIEWS:  XR CHEST PORTABLE  FINDINGS: Cardiomediastinal silhouette normal  Chest tubes over medial upper left hemithorax with no pneumothorax  Mild left base atelectasis  Upper abdomen normal  Bones normal for age  Impression: Left chest tube with no pneumothorax  Workstation performed: [id number]         Hospital Course:   Jimmy Mars 67 y o  male who presented electively after being noticed with stage II LLL adenocarcinoma s/p chemotherapy  Overall he tolerated this well  Last dose of chemo was May 9  He denies any weight loss  He has a good appetite and good energy levels  No shortness of breath     I personally reviewed his repeat PET scan in PACS  This showed no evidence of distant disease  He has a resolving mandibular issue that is likely dental   This did show a continued left lower lobe mass but no evidence of metastatic spread  On 6/8, he went to the OR where he underwent a robotic RLL lobectomy with mediastinal dissection, bronchoscopy, and intercostal nerve block  Patient was kept postoperatively where his chest tube was monitored and his pain was controlled    His chest tube was removed on 6/10, after post pull x-ray showed no presence of pneumothorax  Patient was discharged on HD#2/POD2  On the day of discharge, the patient was voiding spontaneously, ambulating at baseline, and pain was well controlled  He understood all instructions for discharge  He was also given the names and numbers of the providers as well as instructions for follow up appointments  Condition at Discharge: good     Discharge instructions/Information to patient and family:   See after visit summary for information provided to patient and family  Provisions for Follow-Up Care:  See after visit summary for information related to follow-up care and any pertinent home health orders  Disposition: Home    Planned Readmission: No    Discharge Statement   I spent 30 minutes discharging the patient  This time was spent on the day of discharge  I had direct contact with the patient on the day of discharge  Additional documentation is required if more than 30 minutes were spent on discharge  Discharge Medications:  See after visit summary for reconciled discharge medications provided to patient and family        Freya Simeon MD  6/10/2023  11:37 AM

## 2023-06-12 ENCOUNTER — TRANSITIONAL CARE MANAGEMENT (OUTPATIENT)
Dept: FAMILY MEDICINE CLINIC | Facility: CLINIC | Age: 73
End: 2023-06-12

## 2023-06-16 ENCOUNTER — TELEPHONE (OUTPATIENT)
Dept: CARDIAC SURGERY | Facility: CLINIC | Age: 73
End: 2023-06-16

## 2023-06-16 PROCEDURE — 88305 TISSUE EXAM BY PATHOLOGIST: CPT | Performed by: PATHOLOGY

## 2023-06-16 PROCEDURE — 88313 SPECIAL STAINS GROUP 2: CPT | Performed by: PATHOLOGY

## 2023-06-16 PROCEDURE — 88309 TISSUE EXAM BY PATHOLOGIST: CPT | Performed by: PATHOLOGY

## 2023-06-16 PROCEDURE — 88312 SPECIAL STAINS GROUP 1: CPT | Performed by: PATHOLOGY

## 2023-06-16 NOTE — TELEPHONE ENCOUNTER
I called and spoke with Nathan Schofield this afternoon  Overall he is doing very well 1 week out  He did not fill his prescription for oxycodone  He is only taking Tylenol and gabapentin  He denies any major pain  No shortness of breath    We reviewed his pathology  This was a 4 cm adenocarcinoma  All margins were negative  All lymph nodes were negative  He understands this makes this a stage I tumor  Based on that he is should not require any further adjuvant treatment  I will review this with his oncologist   He will see us back in our office in a few weeks  He will call sooner with any questions or concerns       [employer]

## 2023-06-23 ENCOUNTER — TELEPHONE (OUTPATIENT)
Dept: CARDIAC SURGERY | Facility: CLINIC | Age: 73
End: 2023-06-23

## 2023-06-23 NOTE — TELEPHONE ENCOUNTER
Spoke to pt in regards to follow up appointment with Dr Susan Fairchild on 06/28/23  I informed pt that he will need to get a CXR done prior to appt  Pt verbalized understanding and was appreciative of the call

## 2023-06-26 ENCOUNTER — HOSPITAL ENCOUNTER (OUTPATIENT)
Dept: RADIOLOGY | Facility: HOSPITAL | Age: 73
Discharge: HOME | End: 2023-06-26
Payer: MEDICARE

## 2023-06-26 DIAGNOSIS — C34.92 ADENOCARCINOMA OF LEFT LUNG (HCC): ICD-10-CM

## 2023-06-26 PROCEDURE — 71046 X-RAY EXAM CHEST 2 VIEWS: CPT

## 2023-06-28 ENCOUNTER — DOCUMENTATION (OUTPATIENT)
Dept: CARDIAC SURGERY | Facility: CLINIC | Age: 73
End: 2023-06-28

## 2023-06-28 ENCOUNTER — OFFICE VISIT (OUTPATIENT)
Dept: CARDIAC SURGERY | Facility: CLINIC | Age: 73
End: 2023-06-28

## 2023-06-28 ENCOUNTER — TELEPHONE (OUTPATIENT)
Dept: HEMATOLOGY ONCOLOGY | Facility: CLINIC | Age: 73
End: 2023-06-28

## 2023-06-28 VITALS
BODY MASS INDEX: 23.23 KG/M2 | HEIGHT: 70 IN | RESPIRATION RATE: 16 BRPM | TEMPERATURE: 97.7 F | OXYGEN SATURATION: 97 % | WEIGHT: 162.26 LBS | DIASTOLIC BLOOD PRESSURE: 66 MMHG | HEART RATE: 60 BPM | SYSTOLIC BLOOD PRESSURE: 113 MMHG

## 2023-06-28 DIAGNOSIS — C34.92 ADENOCARCINOMA, LUNG, LEFT (HCC): Primary | ICD-10-CM

## 2023-06-28 PROCEDURE — 99024 POSTOP FOLLOW-UP VISIT: CPT | Performed by: THORACIC SURGERY (CARDIOTHORACIC VASCULAR SURGERY)

## 2023-06-28 NOTE — TELEPHONE ENCOUNTER
Patient Call    Who are you speaking with? Patient    If it is not the patient, are they listed on an active communication consent form? N/A   What is the reason for this call? Patient calling to confirm Dr Wayne Bhandari received results from imaging 6/26/23   Does this require a call back? yes   If a call back is required, please list best call back number [phone]   If a call back is required, advise that a message will be forwarded to their care team and someone will return their call as soon as possible  Did you relay this information to the patient?  yes

## 2023-06-28 NOTE — PROGRESS NOTES
Thoracic Follow-Up  Assessment/Plan:    Adenocarcinoma, lung, left [facility]  Mr Stephanie Andrade is about 3 weeks out from a left lower lobectomy for Stage IIA adenocarcinoma s/p neoadjuvant chemotherapy  He is recovering overall well  He does have a cough that started about a week ago  His CXR was personally reviewed by myself and was within normal limits  His incisions are well healed and his stitch was removed at this visit  He can continue to use PRN tylenol and ibuprofen for pain  He has scheduled follow-up with his medical oncologist  We will have him return in 4 weeks with a chest x-ray  All questions were answered and he is in agreement with this plan  Diagnoses and all orders for this visit:    Adenocarcinoma, lung, left ([facility] [address] )          Thoracic History    Diagnosis: Left lower lobe adenocarcinoma  Procedure: 6/8/2023 robotic-assisted  leftt lower lobectomy with mediastinal lymph node dissection  Pathology: Left lower lobe with invasive papillary adenocarcinoma 4cm, lymphatic and venous channel invasion by tumor identified, tumor approaches but does not invade the visceral pleura, bronchial and vascular margins negative for malignancy  Twenty seven lymph nodes identified, all negative for malignancy  Cancer Staging   Adenocarcinoma, lung, left ([facility] )  Staging form: Lung, AJCC 8th Edition  - Clinical stage from 6/8/2023: Stage IIA (ycT2b, cN0, cM0) - Signed by Dano Verdugo PA-C on 6/28/2023  Histopathologic type: Adenocarcinoma, NOS  Stage prefix: Post-therapy    Subjective:    Patient ID: Lynette Alfonso is a 67 y o  male  ECOG 0    HPI   Eloy Estrada is a 67year old male with known Stage IIA left lower lobe adenocarcinoma s/p neoadjuvant chemotherapy who now presents post-operatively from a robotic-assisted left lower lobectomy on 6/8/2023  He had an uncomplicated hospital course  His chest tube was removed and he was discharged on POD2       His CXR was reviewed in PACS by myself and shows no cardiopulmonary disease  On discussion he is feeling overall well  He started with a dry, non-productive cough about a week ago  Denies fevers, chills, recent illness  Denies shortness of breath  He has been walking his dog about 20 minutes three times a day  The following portions of the patient's history were reviewed and updated as appropriate: allergies, current medications, past family history, past medical history, past social history, past surgical history and problem list     Review of Systems   Constitutional: Negative for chills, diaphoresis and unexpected weight change  HENT: Negative  Eyes: Negative  Respiratory: Positive for cough  Negative for shortness of breath and wheezing  Cardiovascular: Negative for chest pain and leg swelling  Gastrointestinal: Negative for abdominal pain, diarrhea and nausea  Endocrine: Negative  Genitourinary: Negative  Musculoskeletal: Negative  Skin: Negative  Allergic/Immunologic: Negative  Neurological: Negative  Hematological: Negative for adenopathy  Does not bruise/bleed easily  Psychiatric/Behavioral: Negative  All other systems reviewed and are negative  Objective:   Physical Exam  Vitals reviewed  Constitutional:       General: He is not in acute distress  Appearance: Normal appearance  He is not ill-appearing  HENT:      Head: Normocephalic  Nose: Nose normal       Mouth/Throat:      Mouth: Mucous membranes are moist    Eyes:      Pupils: Pupils are equal, round, and reactive to light  Cardiovascular:      Rate and Rhythm: Normal rate and regular rhythm  Heart sounds: Normal heart sounds  Pulmonary:      Effort: Pulmonary effort is normal       Breath sounds: Normal breath sounds  No wheezing, rhonchi or rales  Chest:      Comments: Surgical incisions well healed one prolene stitch removed at this visit  Abdominal:      Palpations: Abdomen is soft     Musculoskeletal:         General: No "swelling  Normal range of motion  Lymphadenopathy:      Cervical: No cervical adenopathy  Skin:     General: Skin is warm  Neurological:      General: No focal deficit present  Mental Status: He is alert and oriented to person, place, and time  Psychiatric:         Mood and Affect: Mood normal      /66 (BP Location: Left arm, Patient Position: Sitting, Cuff Size: Standard)   Pulse 60   Temp 97 7 °F (36 5 °C) (Temporal)   Resp 16   Ht 5' 10\" (1 778 m)   Wt 73 6 kg (162 lb 4 1 oz)   SpO2 97%   BMI 23 28 kg/m²     XR chest pa & lateral    Result Date: 6/28/2023  Impression No acute cardiopulmonary disease  Workstation performed: [id number]     XR chest pa & lateral    Result Date: 6/10/2023  Impression Left chest tube removal after left lower lobectomy with no pneumothorax  Workstation performed: [id number]      No CT Chest results available for this patient  No CT Chest,Abdomen,Pelvis results available for this patient  NM PET CT skull base to mid thigh    Result Date: 5/23/2023  Narrative PET/CT SCAN INDICATION: C34 92: Malignant neoplasm of unspecified part of left bronchus or lung C34 32: Malignant neoplasm of lower lobe, left bronchus or lung Restaging study following neoadjuvant therapy, lung cancer  MODIFIER: PS COMPARISON: 3/16/2023, CT abdomen and pelvis 4/21/2023 CELL TYPE: Left lower lobe adenocarcinoma TECHNIQUE:   8 3 mCi F-18-FDG administered IV  Multiplanar attenuation corrected and non attenuation corrected PET images are available for interpretation, and contiguous, low dose, axial CT sections were obtained from the skull base through the femurs  Intravenous contrast material was not utilized  This examination, like all CT scans performed in the [facility], was performed utilizing techniques to minimize radiation dose exposure, including the use of iterative reconstruction and automated exposure control   Fasting serum glucose: 97 mg/dl FINDINGS: VISUALIZED " BRAIN: No acute abnormalities are seen  HEAD/NECK: -No suspicious FDG avid cervical adenopathy - Prior study demonstrated increased activity of the left mandible, SUV max was 5 9 now 3 2  This may be associated with diminished inflammation associated with dental/periodontal disease - Mild bilateral symmetric and diffuse thyroid activity can be a normal physiologic finding or related to thyroid dysfunction; correlate with thyroid function tests as clinically indicated CT images: Otherwise unremarkable CHEST: -Again seen is the semisolid left lower lobe mass earlier up to 4 6 cm in size currently 3 4 cm  Prior SUV max was 2 2, currently 2 1  - No new parenchymal abnormalities - No development of FDG avid mediastinal or hilar adenopathy CT images: Coronary artery calcification  No pericardial or pleural effusion ABDOMEN: No FDG avid soft tissue lesions are seen  CT images: Presumed bilateral renal cysts  Moderately increased stool within the colon  Small periumbilical adipose containing hernia  PELVIS: No FDG avid soft tissue lesions are seen  CT images: Left gluteal intramuscular lipoma  OSSEOUS STRUCTURES: No FDG avid lesions are seen  CT images: No significant findings  Impression 1  The semisolid left lower lobe mass has diminished in size  FDG avidity has not significantly changed  2  No new FDG-avid abnormalities within the thorax  No evidence of FDG avid metastatic disease in the neck, abdomen, pelvis or skeleton 3  Partially diminished FDG avidity of the left mandible Workstation performed: [id number]     NM PET CT skull base to mid thigh    Result Date: 3/16/2023  Narrative PET/CT SCAN INDICATION: New diagnosis of lung cancer  Initial staging  C34 92: Malignant neoplasm of unspecified part of left bronchus or lung C34 02: Malignant neoplasm of left main bronchus MODIFIER: PI COMPARISON: CT chest 1/30/2023 CELL TYPE:  Adenocarcinoma of the lung TECHNIQUE:   8 5 mCi F-18-FDG administered IV   Multiplanar attenuation corrected and non attenuation corrected PET images are available for interpretation, and contiguous, low dose, axial CT sections were obtained from the skull vertex through the femurs  Intravenous contrast material was not utilized  This examination, like all CT scans performed in the [facility], was performed utilizing techniques to minimize radiation dose exposure, including the use of iterative reconstruction and automated exposure control  Fasting serum glucose: 84 mg/dl FINDINGS: VISUALIZED BRAIN:   No acute abnormalities are seen  HEAD/NECK:   Focal radiotracer uptake at the left mandible, SUV max of 5 9  There is a focal lucency adjacent to a more on CT measuring up to 9 mm in size image 67 series 3  No FDG avid lymph nodes  CT images: No additional findings  CHEST:   Mild radiotracer uptake related to the left lower lobe mass, SUV max of 2 2  This is a semi solid density on CT measures up to 4 6 cm in size, stable  No suspicious focal FDG uptake in the mediastinal or perihilar regions  CT images: Mild biapical nodularity better seen on prior chest CT  No suspicious FDG uptake here  ABDOMEN:   No FDG avid soft tissue lesions are seen  CT images: A few renal hypodensities likely cysts  Moderate fecal retention in the colon  Small fat-containing focal hernia  Colonic diverticulosis  PELVIS: No FDG avid soft tissue lesions are seen  CT images: 5 2 cm lipoma of the left gluteal musculature anteriorly  OSSEOUS STRUCTURES: No FDG avid lesions are seen  Focal uptake inferior to the right glenoid likely inflammatory such as related to arthritis given the location  No obvious findings on CT  CT images: No significant findings  Impression 1  The left lower lobe mass demonstrates mild FDG uptake compatible with the known malignancy  The low level of uptake would favor a low-grade malignancy  2  No findings for hypermetabolic metastasis   3   Focal radiotracer uptake at the left mandible adjacent to a mandibular molar with corresponding lucent lesion on CT  Findings suggest inflammatory periodontal disease  Correlate with dental exam  Workstation performed: [id number]      No Barium Swallow results available for this patient

## 2023-06-28 NOTE — ASSESSMENT & PLAN NOTE
Mr Antonio Prather is about 3 weeks out from a left lower lobectomy for Stage IIA adenocarcinoma s/p neoadjuvant chemotherapy  He is recovering overall well  He does have a cough that started about a week ago  His CXR was personally reviewed by myself and was within normal limits  His incisions are well healed and his stitch was removed at this visit  He can continue to use PRN tylenol and ibuprofen for pain  He has scheduled follow-up with his medical oncologist  We will have him return in 4 weeks with a chest x-ray  All questions were answered and he is in agreement with this plan

## 2023-07-26 ENCOUNTER — HOSPITAL ENCOUNTER (OUTPATIENT)
Dept: RADIOLOGY | Facility: HOSPITAL | Age: 73
Discharge: HOME | End: 2023-07-26
Payer: MEDICARE

## 2023-07-26 DIAGNOSIS — C34.92 ADENOCARCINOMA, LUNG, LEFT (HCC): ICD-10-CM

## 2023-07-26 PROCEDURE — 71046 X-RAY EXAM CHEST 2 VIEWS: CPT

## 2023-07-31 ENCOUNTER — DOCUMENTATION (OUTPATIENT)
Dept: CARDIAC SURGERY | Facility: CLINIC | Age: 73
End: 2023-07-31

## 2023-07-31 ENCOUNTER — OFFICE VISIT (OUTPATIENT)
Dept: CARDIAC SURGERY | Facility: CLINIC | Age: 73
End: 2023-07-31
Payer: MEDICARE

## 2023-07-31 VITALS
WEIGHT: 171.08 LBS | HEART RATE: 77 BPM | OXYGEN SATURATION: 98 % | BODY MASS INDEX: 24.49 KG/M2 | DIASTOLIC BLOOD PRESSURE: 79 MMHG | SYSTOLIC BLOOD PRESSURE: 140 MMHG | RESPIRATION RATE: 16 BRPM | HEIGHT: 70 IN | TEMPERATURE: 98.2 F

## 2023-07-31 DIAGNOSIS — C34.92 ADENOCARCINOMA, LUNG, LEFT (HCC): Primary | ICD-10-CM

## 2023-07-31 PROCEDURE — 99213 OFFICE O/P EST LOW 20 MIN: CPT | Performed by: PHYSICIAN ASSISTANT

## 2023-07-31 NOTE — PROGRESS NOTES
I met with Emma Junior at his post-op visit today. He's familiar with me from prior visits. He's doing well. All questions answered.

## 2023-07-31 NOTE — PROGRESS NOTES
Thoracic Follow-Up  Assessment/Plan:    Adenocarcinoma, lung, left [facility])  Mr. Monik Yousif is  About 8 weeks out from a robotic-assisted left lower lobectomy for stage IIA adenocarcinoma. He completed neoadjuvant chemotherapy. He is recovering very well. We will have him return 6 months from surgery for his first surveillance visit. We explained surveillance per NCCN guidelines will be a CT chest every 6 months for three years given his Stage II disease, and then yearly from then on out. We will schedule CT chest and follow-up in December. All questions were answered and he is in agreement with this plan. Diagnoses and all orders for this visit:    Adenocarcinoma, lung, left ([address])  -     CT chest wo contrast; Future          Thoracic History     Diagnosis: Left lower lobe adenocarcinoma  Procedure: 6/8/2023 robotic-assisted  leftt lower lobectomy with mediastinal lymph node dissection  Pathology: Left lower lobe with invasive papillary adenocarcinoma 4cm, lymphatic and venous channel invasion by tumor identified, tumor approaches but does not invade the visceral pleura, bronchial and vascular margins negative for malignancy. Twenty seven lymph nodes identified, all negative for malignancy. Cancer Staging   Adenocarcinoma, lung, left ([address])  Staging form: Lung, AJCC 8th Edition  - Clinical stage from 6/8/2023: Stage IIA (ycT2b, cN0, cM0) - Signed by Farideh Sheldon PA-C on 6/28/2023  Histopathologic type: Adenocarcinoma, NOS  Stage prefix: Post-therapy       Subjective:    Patient ID: Jovan Mazariegos is a 68 y.o. male. ECOG 0    HPI   Emely Valles is a 67year old male with a history of Stage IIA left lower lobe adenocarcinoma s/p neoadjuvant chemotherapy and robotic-assisted left lower lobectomy on 6/8/2023. He was last seen in our office on 6/28/2023 at which point he was recovering well. CXR from 7/26/2023 shows left lwoer lobectomy with mild elevation of the left hemidiaphragm with no acute disease. On discussion he is feeling very well. He has been walking his dog three times a day. Using IS up to 2750cc. He denies cough, SOB, pain, hemoptysis, recent illness. He had no 30 day hospital readmission from discharge    The following portions of the patient's history were reviewed and updated as appropriate: allergies, current medications, past family history, past medical history, past social history, past surgical history and problem list.    Review of Systems   Constitutional: Negative for chills, diaphoresis and unexpected weight change. HENT: Negative. Eyes: Negative. Respiratory: Negative for cough, shortness of breath and wheezing. Cardiovascular: Negative for chest pain and leg swelling. Gastrointestinal: Negative for abdominal pain, diarrhea and nausea. Endocrine: Negative. Genitourinary: Negative. Musculoskeletal: Negative. Skin: Negative. Allergic/Immunologic: Negative. Neurological: Negative. Hematological: Negative for adenopathy. Does not bruise/bleed easily. Psychiatric/Behavioral: Negative. All other systems reviewed and are negative. Objective:   Physical Exam  Vitals reviewed. Constitutional:       General: He is not in acute distress. Appearance: Normal appearance. He is not ill-appearing. HENT:      Head: Normocephalic. Nose: Nose normal.      Mouth/Throat:      Mouth: Mucous membranes are moist.   Eyes:      Pupils: Pupils are equal, round, and reactive to light. Cardiovascular:      Rate and Rhythm: Normal rate and regular rhythm. Heart sounds: Normal heart sounds. Pulmonary:      Effort: Pulmonary effort is normal.      Breath sounds: Normal breath sounds. No wheezing, rhonchi or rales. Chest:      Comments: Surgical incisions healing well  Abdominal:      Palpations: Abdomen is soft. Musculoskeletal:         General: No swelling. Normal range of motion. Lymphadenopathy:      Cervical: No cervical adenopathy. Skin:     General: Skin is warm. Neurological:      General: No focal deficit present. Mental Status: He is alert and oriented to person, place, and time. Psychiatric:         Mood and Affect: Mood normal.     /79 (BP Location: Left arm, Patient Position: Sitting, Cuff Size: Standard)   Pulse 77   Temp 98.2 °F (36.8 °C) (Temporal)   Resp 16   Ht 5' 10" (1.778 m)   Wt 77.6 kg (171 lb 1.2 oz)   SpO2 98%   BMI 24.55 kg/m²     XR chest pa & lateral    Result Date: 7/28/2023  Impression Left lower lobectomy with mild elevation of the left hemidiaphragm with no acute disease. Workstation performed: [id number]     XR chest pa & lateral    Result Date: 6/28/2023  Impression No acute cardiopulmonary disease. Workstation performed: [id number]     XR chest pa & lateral    Result Date: 6/10/2023  Impression Left chest tube removal after left lower lobectomy with no pneumothorax. Workstation performed: [id number]      No CT Chest results available for this patient. No CT Chest,Abdomen,Pelvis results available for this patient. NM PET CT skull base to mid thigh    Result Date: 5/23/2023  Narrative PET/CT SCAN INDICATION: C34.92: Malignant neoplasm of unspecified part of left bronchus or lung C34.32: Malignant neoplasm of lower lobe, left bronchus or lung Restaging study following neoadjuvant therapy, lung cancer. MODIFIER: PS COMPARISON: 3/16/2023, CT abdomen and pelvis 4/21/2023 CELL TYPE: Left lower lobe adenocarcinoma TECHNIQUE:   8.3 mCi F-18-FDG administered IV. Multiplanar attenuation corrected and non attenuation corrected PET images are available for interpretation, and contiguous, low dose, axial CT sections were obtained from the skull base through the femurs. Intravenous contrast material was not utilized.  This examination, like all CT scans performed in the [facility], was performed utilizing techniques to minimize radiation dose exposure, including the use of iterative reconstruction and automated exposure control. Fasting serum glucose: 97 mg/dl FINDINGS: VISUALIZED BRAIN: No acute abnormalities are seen. HEAD/NECK: -No suspicious FDG avid cervical adenopathy - Prior study demonstrated increased activity of the left mandible, SUV max was 5.9 now 3.2. This may be associated with diminished inflammation associated with dental/periodontal disease - Mild bilateral symmetric and diffuse thyroid activity can be a normal physiologic finding or related to thyroid dysfunction; correlate with thyroid function tests as clinically indicated CT images: Otherwise unremarkable CHEST: -Again seen is the semisolid left lower lobe mass earlier up to 4.6 cm in size currently 3.4 cm. Prior SUV max was 2.2, currently 2.1. - No new parenchymal abnormalities - No development of FDG avid mediastinal or hilar adenopathy CT images: Coronary artery calcification. No pericardial or pleural effusion ABDOMEN: No FDG avid soft tissue lesions are seen. CT images: Presumed bilateral renal cysts. Moderately increased stool within the colon. Small periumbilical adipose containing hernia. PELVIS: No FDG avid soft tissue lesions are seen. CT images: Left gluteal intramuscular lipoma. OSSEOUS STRUCTURES: No FDG avid lesions are seen. CT images: No significant findings. Impression 1. The semisolid left lower lobe mass has diminished in size. FDG avidity has not significantly changed. 2. No new FDG-avid abnormalities within the thorax. No evidence of FDG avid metastatic disease in the neck, abdomen, pelvis or skeleton 3. Partially diminished FDG avidity of the left mandible Workstation performed: [id number]     NM PET CT skull base to mid thigh    Result Date: 3/16/2023  Narrative PET/CT SCAN INDICATION: New diagnosis of lung cancer. Initial staging. C34.92: Malignant neoplasm of unspecified part of left bronchus or lung C34.02:  Malignant neoplasm of left main bronchus MODIFIER: PI COMPARISON: CT chest 1/30/2023 CELL TYPE:  Adenocarcinoma of the lung TECHNIQUE:   8.5 mCi F-18-FDG administered IV. Multiplanar attenuation corrected and non attenuation corrected PET images are available for interpretation, and contiguous, low dose, axial CT sections were obtained from the skull vertex through the femurs. Intravenous contrast material was not utilized. This examination, like all CT scans performed in the [facility], was performed utilizing techniques to minimize radiation dose exposure, including the use of iterative reconstruction and automated exposure control. Fasting serum glucose: 84 mg/dl FINDINGS: VISUALIZED BRAIN:   No acute abnormalities are seen. HEAD/NECK:   Focal radiotracer uptake at the left mandible, SUV max of 5.9. There is a focal lucency adjacent to a more on CT measuring up to 9 mm in size image 67 series 3. No FDG avid lymph nodes. CT images: No additional findings. CHEST:   Mild radiotracer uptake related to the left lower lobe mass, SUV max of 2.2. This is a semi solid density on CT measures up to 4.6 cm in size, stable. No suspicious focal FDG uptake in the mediastinal or perihilar regions. CT images: Mild biapical nodularity better seen on prior chest CT. No suspicious FDG uptake here. ABDOMEN:   No FDG avid soft tissue lesions are seen. CT images: A few renal hypodensities likely cysts. Moderate fecal retention in the colon. Small fat-containing focal hernia. Colonic diverticulosis. PELVIS: No FDG avid soft tissue lesions are seen. CT images: 5.2 cm lipoma of the left gluteal musculature anteriorly. OSSEOUS STRUCTURES: No FDG avid lesions are seen. Focal uptake inferior to the right glenoid likely inflammatory such as related to arthritis given the location. No obvious findings on CT. CT images: No significant findings. Impression 1. The left lower lobe mass demonstrates mild FDG uptake compatible with the known malignancy.  The low level of uptake would favor a low-grade malignancy. 2. No findings for hypermetabolic metastasis. 3.  Focal radiotracer uptake at the left mandible adjacent to a mandibular molar with corresponding lucent lesion on CT. Findings suggest inflammatory periodontal disease. Correlate with dental exam. Workstation performed: [id number]      No Barium Swallow results available for this patient.

## 2023-07-31 NOTE — ASSESSMENT & PLAN NOTE
Mr. Mono Gandhi is  About 8 weeks out from a robotic-assisted left lower lobectomy for stage IIA adenocarcinoma. He completed neoadjuvant chemotherapy. He is recovering very well. We will have him return 6 months from surgery for his first surveillance visit. We explained surveillance per NCCN guidelines will be a CT chest every 6 months for three years given his Stage II disease, and then yearly from then on out. We will schedule CT chest and follow-up in December. All questions were answered and he is in agreement with this plan.

## 2023-08-08 ENCOUNTER — OFFICE VISIT (OUTPATIENT)
Dept: HEMATOLOGY ONCOLOGY | Facility: CLINIC | Age: 73
End: 2023-08-08
Payer: MEDICARE

## 2023-08-08 VITALS
WEIGHT: 170.5 LBS | BODY MASS INDEX: 24.41 KG/M2 | OXYGEN SATURATION: 98 % | TEMPERATURE: 97.7 F | DIASTOLIC BLOOD PRESSURE: 72 MMHG | HEIGHT: 70 IN | SYSTOLIC BLOOD PRESSURE: 112 MMHG | RESPIRATION RATE: 16 BRPM | HEART RATE: 55 BPM

## 2023-08-08 DIAGNOSIS — K52.1 DRUG-INDUCED COLITIS: Primary | ICD-10-CM

## 2023-08-08 DIAGNOSIS — C34.92 ADENOCARCINOMA, LUNG, LEFT (HCC): ICD-10-CM

## 2023-08-08 PROCEDURE — 99214 OFFICE O/P EST MOD 30 MIN: CPT | Performed by: INTERNAL MEDICINE

## 2023-08-08 RX ORDER — PREDNISONE 20 MG/1
20 TABLET ORAL DAILY
Qty: 35 TABLET | Refills: 0 | Status: SHIPPED | OUTPATIENT
Start: 2023-08-08 | End: 2023-09-05

## 2023-08-08 NOTE — PROGRESS NOTES
Medical Oncology Follow-Up:  Primary Care Provider: Pat Champion DO  MRN: [id number]  : 1950    Reason for Encounter: Follow-Up. Oncology History   Adenocarcinoma, lung, left ([address])   3/6/2023 Initial Diagnosis    Adenocarcinoma, lung, left ([address])     3/28/2023 -  Chemotherapy    palonosetron (ALOXI), 0.25 mg, Intravenous, Once, 3 of 3 cycles  Administration: 0.25 mg (3/28/2023), 0.25 mg (2023), 0.25 mg (2023)  fosaprepitant (EMEND) IVPB, 150 mg, Intravenous, Once, 3 of 3 cycles  Administration: 150 mg (3/28/2023), 150 mg (2023), 150 mg (2023)  nivolumab (OPDIVO) IVPB, 360 mg, Intravenous, Once, 2 of 2 cycles  Administration: 360 mg (3/28/2023), 360 mg (2023)  CARBOplatin (PARAPLATIN) IVPB (GO AUC DOSING), 561.5 mg, Intravenous, Once, 3 of 3 cycles  Administration: 561.5 mg (3/28/2023), 532 mg (2023), 551 mg (2023)  PACLItaxel (TAXOL) chemo IVPB, 175 mg/m2 = 336 mg, Intravenous, Once, 3 of 3 cycles  Administration: 336 mg (3/28/2023), 336 mg (2023), 336 mg (2023)     2023 -  Cancer Staged    Staging form: Lung, AJCC 8th Edition  - Clinical stage from 2023: Stage IIA (ycT2b, cN0, cM0) - Signed by Ramandeep Iqbal PA-C on 2023  Histopathologic type: Adenocarcinoma, NOS  Stage prefix: Post-therapy         Interval History: Patient is a 77-year-old male with established stage 2a adenocarcinoma of the left lung status-post neoadjuvant Carboplatin and Paclitaxel with Nivolumab followed by left lower lobe lobectomy in 2023, who presents today for follow-up. On evaluation this morning, patient states that he continues to have small-volume diarrhea with bright-red blood per rectum and passage of intact clots secondary to established immunotherapy-induced left-sided colitis (Identified on diagnostic colonoscopy on May 5th, 2023). With the exception of the above-mentioned, patient is otherwise doing well.  He is highly-active at baseline; and walks daily with his dog. No associated exertional dyspnea, or shortness of breath at rest. Appetite is intact; without associated nausea, or vomiting. He states that his weight is stable; although is trending upwards. Of particular importance, patient followed-up with Cardiothoracic Surgery on July 31st. From a surgical-standpoint, he is doing well. He is anticipating repeat CT Chest, and follow-up in 6-months (Scheduled in December 2023). Patient has no further questions or concerns at this time. ECOG Performance Status: 0. Review of Systems:  All systems reviewed and negative except otherwise listed in the History of Present Illness. Problem List:  Patient Active Problem List   Diagnosis   • Negative depression screening   • Hypercholesterolemia   • Adenocarcinoma, lung, left ([address])   • Platelets decreased ([address])   • BPH with obstruction/lower urinary tract symptoms     Assessment and Plan:  Patient is a 79-year-old male with established stage 2a adenocarcinoma of the left lung. He completed neoadjuvant Carboplatin and Paclitaxel with Nivolumab, and subsequently underwent left lower lobectomy in June 2023. Given that patient completed neoadjuvant chemotherapy, had negative surgical-margins, and did not have evidence of lymph node involvement, there is no indication for radiation therapy or systemic-chemotherapy in the adjuvant setting. Patient is doing well. Will plan for interval follow-up in two-months. Of particular importance, for established immunotherapy-induced colitis with persistent small-volume diarrhea, bright-red blood per rectum, and passage of intact clots, will start Prednisone 40 mg Daily, and taper over 4-weeks, as per below. Patient expressed understanding and agreement with the above-mentioned plan. 1. Start Prednisone 40 mg Daily for 7-days and will plan to taper by 10 mg per week. A. E.g. Plan for taper to Prednisone 30 mg for 7-days, 20 mg for 7-days, and 10 mg for 7-days.    2. Recommend follow-up in 2-months. Past Medical History:   has a past medical history of BPH with obstruction/lower urinary tract symptoms, Cystitis, chronic, Epididymo-orchitis, Feeling of incomplete bladder emptying, Frequency of micturition, Nocturia, Other microscopic hematuria, Poor urinary stream, and Simple renal cyst.    Past Surgical History:   has a past surgical history that includes Cystoscopy (2012); IR biopsy lung (02/23/2023); Fracture surgery; pr thoracoscopy w/lobectomy single lobe (Left, 6/8/2023); and pr [facility] incl fluor gdnce dx w/cell washg spx (N/A, 6/8/2023). Medications:  Current Outpatient Medications   Medication Sig Dispense Refill   • docusate sodium (COLACE) 100 mg capsule Take 1 capsule (100 mg total) by mouth 2 (two) times a day (Patient not taking: Reported on 7/31/2023) 60 capsule 0   • gabapentin (NEURONTIN) 100 mg capsule Take 1 capsule (100 mg total) by mouth 3 (three) times a day (Patient not taking: Reported on 7/31/2023) 63 capsule 0     No current facility-administered medications for this visit. Social History:   reports that he quit smoking about 5 months ago. His smoking use included cigarettes. He has a 60.00 pack-year smoking history. He has never used smokeless tobacco. He reports that he does not currently use alcohol. He reports that he does not use drugs. Family History:   family history includes Diabetes in his mother; Hypertension in his mother; No Known Problems in his father. Allergies:  has No Known Allergies. Objective:  Visit Vitals  /72 (BP Location: Left arm, Patient Position: Sitting, Cuff Size: Standard)   Pulse 55   Temp 97.7 °F (36.5 °C) (Temporal)   Resp 16   Ht 5' 10" (1.778 m)   Wt 77.3 kg (170 lb 8 oz)   SpO2 98%   BMI 24.46 kg/m²   Smoking Status Former   BSA 1.95 m²     Body mass index is 24.46 kg/m². Body surface area is 1.95 meters squared.     Physical Exam:  General: Alert, and oriented; Pleasant, and conversational; No apparent distress  HEENT: Atraumatic, and normocephalic; PERRLA; EOMI; Moist mucosal membranes  Neck: Trachea midline; No neck masses, thyromegaly, or cervical lymphadenopathy present on my exam  Chest: Surgical-incisions on left chest wall are healing well (Tender to palpation)  Cardiovascular: Regular rate and rhythm; No murmurs, rubs, or gallops appreciated; No peripheral edema  Respiratory: Diminished at left lung base; Otherwise, adequate aeration and clear to auscultation bilaterally; No supplemental oxygen requirement  Abdomen: Soft, non-tender; Non-distended; No organomegaly appreciated; Bowel sounds present in 4-quadrants  Extremities: No obvious deformities; No peripheral edema; Moves all  Neurologic: Appropriately alert, and oriented to Person, Place, and Time; No focal neurologic deficits    Labs:  Lab Results   Component Value Date    WBC 8.00 06/09/2023    HGB 11.4 (L) 06/09/2023    HCT 36.5 06/09/2023     (H) 06/09/2023     (L) 06/09/2023     Lab Results   Component Value Date    SODIUM 139 06/09/2023    K 4.5 06/09/2023     (H) 06/09/2023    CO2 28 06/09/2023    AGAP 0 (L) 06/09/2023    BUN 15 06/09/2023    CREATININE 0.78 06/09/2023    GLUC 162 (H) 06/09/2023    GLUF 104 (H) 05/30/2023    CALCIUM 8.6 06/09/2023    AST 13 05/06/2023    ALT 27 05/06/2023    ALKPHOS 57 05/06/2023    TP 7.2 05/06/2023    TBILI 0.38 05/06/2023    EGFR 90 06/09/2023     Patient was seen and discussed with attending physician, Zeenat Guerrero M.D.    Sagar Anderson D.O.   Hematology-Oncology Fellow (PGY-4)

## 2023-08-14 ENCOUNTER — TELEPHONE (OUTPATIENT)
Dept: HEMATOLOGY ONCOLOGY | Facility: CLINIC | Age: 73
End: 2023-08-14

## 2023-08-14 NOTE — TELEPHONE ENCOUNTER
Patient Call    Who are you speaking with? Patient    If it is not the patient, are they listed on an active communication consent form? N/A   What is the reason for this call? Pt needs a note allowing him to go back to work   Does this require a call back? Yes   If a call back is required, please list best call back number [phone]   If a call back is required, advise that a message will be forwarded to their care team and someone will return their call as soon as possible. Did you relay this information to the patient?  Yes

## 2023-09-18 ENCOUNTER — OFFICE VISIT (OUTPATIENT)
Dept: PHYSICAL THERAPY | Facility: CLINIC | Age: 73
End: 2023-09-18
Payer: MEDICARE

## 2023-09-18 VITALS — DIASTOLIC BLOOD PRESSURE: 85 MMHG | SYSTOLIC BLOOD PRESSURE: 151 MMHG | HEART RATE: 47 BPM

## 2023-09-18 DIAGNOSIS — R53.81 PHYSICAL DECONDITIONING: Primary | ICD-10-CM

## 2023-09-18 PROCEDURE — 97163 PT EVAL HIGH COMPLEX 45 MIN: CPT | Performed by: PHYSICAL THERAPIST

## 2023-09-18 NOTE — PROGRESS NOTES
PT Evaluation     Today's date: 2023  Patient name: Suzette Fontaine  : 1950  MRN: [id number]  Referring provider: Ingrid Dejesus DO  Dx:   Encounter Diagnosis     ICD-10-CM    1. Physical deconditioning  R53.81                      Assessment  Assessment details: Suzette Fontaine is a 68 y.o. male who presents to outpatient PT with a  Physical deconditioning  (primary encounter diagnosis). No further referral appears necessary at this time based upon examination results. Pt presents with decreased strength, ROM, balance, functional activity tolerance, and pain with movement in his bilateral LE's which is  limiting his ability to perform the aforementioned functional activities. Etiologic factors include repetitive poor body mechanics. Prognosis is good given HEP compliance and PT 2/wk. Please contact me if you have any questions or recommendations. Thank you for the opportunity to share in  [facility]. Impairments: abnormal coordination, abnormal muscle firing, abnormal or restricted ROM, activity intolerance, impaired physical strength, lacks appropriate home exercise program, pain with function and safety issue    Symptom irritability: moderateUnderstanding of Dx/Px/POC: good   Prognosis: good    Goals  STG to be achieved in 4 weeks     1. Pt will reduce 5x sit to stand scores to 12 seconds indicating improved LE strength. 2. Pt will improve MMT scores by at least 1/2 grade to promote improved functional activity tolerance     LTG to be achieved in 6-8 weeks   1. Pt will be complaint with HEP   2. Pt will improve ROM to [facility], to help facilitate independence with ADL's, IADL's, and functional activities   3. Pt will [facility]rength to [facility] to help facilitate independence with ADL's, IADL's, and functional activities   4. Pt will have no limitations with ambulation to help facilitate independence at home and in the community.    5. Pt will have no limitations with stair negotiation to help facilitate independence at home and in the community           Plan  Patient would benefit from: skilled physical therapy  Planned therapy interventions: ADL training, balance, balance/weight bearing training, flexibility, functional ROM exercises, gait training, graded activity, graded exercise, graded motor, home exercise program, joint mobilization, manual therapy, neuromuscular re-education, patient education, postural training, strengthening, stretching, therapeutic activities and therapeutic exercise  Frequency: 2x week  Duration in weeks: 12  Plan of Care beginning date: 2023  Plan of Care expiration date: 2023  Treatment plan discussed with: patient, PTA and referring physician        Subjective Evaluation    History of Present Illness  Mechanism of injury: The patient is a 68year old male who presents  To outpatient PT with chief complaint of general deconditioning he does have past history of lung CA, underwent robotic lobectomy on    He recently completed chemotherapy treatment in April of this year. Per patient  able to do activity as tolerated. He reports that ever since his chemotherapy his legs have felt weaker than usual. He is hoping to improve his strength and endurance at PT. Patient Goals  Patient goals for therapy: increased strength, decreased pain and increased motion  Patient goal: Improve strength in legs.    Pain  Current pain rating: 3 (B/L LE)  At best pain rating: 3  At worst pain ratin  Quality: dull ache  Relieving factors: relaxation, rest, support and medications  Aggravating factors: standing, walking, stair climbing and lifting  Progression: no change    Treatments  Current treatment: physical therapy        Objective     Active Range of Motion   Left Hip   Flexion: WFL  Abduction: WFL  Adduction: WFL    Right Hip   Flexion: WFL  Abduction: WFL  Adduction: WFL  Left Knee   Flexion: WFL  Extension: WFL    Right Knee   Flexion: WFL  Extension: WFL  Left Ankle/Foot Dorsiflexion (kf): WFL  Plantar flexion: WFL    Right Ankle/Foot   Dorsiflexion (kf): WF  Plantar flexion: [facility]    Strength/Myotome Testing     Left Hip   Planes of Motion   Flexion: 4-  Abduction: 4-  Adduction: 4-    Right Hip   Planes of Motion   Flexion: 4-  Abduction: 4-  Adduction: 4-    Left Knee   Flexion: 4-  Extension: 4-    Right Knee   Flexion: 4-  Extension: 4-    Left Ankle/Foot   Dorsiflexion: 4-  Plantar flexion: 4-    Right Ankle/Foot   Dorsiflexion: 4-  Plantar flexion: 4-    Functional Assessment        Comments  5x sit to stand - 14 seconds     6 MWT - 1280 feet, Spo2 99, Able to complete 6 minutes. (S) with ambulation. No AD. Precautions: Hx of lung CA. Robotic Lobectomy 6/8 As tolerates.        Manuals                                                                 Neuro Re-Ed             SLS Balance                                                                                            Ther Ex             Nu Step  L3x 10 min             UBE              Hip adduction             Hip abduction              LAQ              Step up              Standing three way              HR TR              Mini Squat                                                                   Ther Activity                                       Gait Training                                       Modalities

## 2023-09-21 ENCOUNTER — OFFICE VISIT (OUTPATIENT)
Dept: PHYSICAL THERAPY | Facility: CLINIC | Age: 73
End: 2023-09-21
Payer: MEDICARE

## 2023-09-21 DIAGNOSIS — R53.81 PHYSICAL DECONDITIONING: Primary | ICD-10-CM

## 2023-09-21 PROCEDURE — 97110 THERAPEUTIC EXERCISES: CPT | Performed by: PHYSICAL THERAPIST

## 2023-09-21 NOTE — PROGRESS NOTES
Daily Note     Today's date: 2023  Patient name: Terrence Hawthorne  : 1950  MRN: [id number]  Referring provider: Ron Cooney DO  Dx:   Encounter Diagnosis     ICD-10-CM    1. Physical deconditioning  R53.81                      Subjective: Patient offers no new complaints today. Objective: See treatment diary below      Assessment: Tolerated treatment well. Patient exhibited good technique with therapeutic exercises and would benefit from continued PT. Good tolerance to initial PT treatment. No adverse effects. Plan: Continue per plan of care. Precautions: Hx of lung CA. Robotic Lobectomy  As tolerates.        Manuals                                                                 Neuro Re-Ed             SLS Balance                                                                                            Ther Ex             Nu Step  L3x 10 min  L3 x 10 min            UBE    120            Hip adduction  5''20x           Hip abduction   Noel 5''20x           LAQ   5''20x           Step up   B 2x10           Standing three way   10x each            HR TR   20x each            Mini Squat               SLR flexion   10x each            Gastroc stretch   30''3x           Hamstring stretch   30''3x           Piriformis stretch   30''3x                                                                            Ther Activity                                       Gait Training                                       Modalities

## 2023-09-25 ENCOUNTER — APPOINTMENT (OUTPATIENT)
Dept: PHYSICAL THERAPY | Facility: CLINIC | Age: 73
End: 2023-09-25
Payer: MEDICARE

## 2023-09-25 ENCOUNTER — OFFICE VISIT (OUTPATIENT)
Dept: PHYSICAL THERAPY | Facility: CLINIC | Age: 73
End: 2023-09-25
Payer: MEDICARE

## 2023-09-25 DIAGNOSIS — R53.81 PHYSICAL DECONDITIONING: Primary | ICD-10-CM

## 2023-09-25 PROCEDURE — 97110 THERAPEUTIC EXERCISES: CPT

## 2023-09-25 NOTE — PROGRESS NOTES
Daily Note     Today's date: 2023  Patient name: Kaur Bass  : 1950  MRN: [id number]  Referring provider: Bambi Schilder, DO  Dx:   Encounter Diagnosis     ICD-10-CM    1. Physical deconditioning  R53.81           Start Time: 1200          Subjective: Pt has no new c/o. States he was a little sore following last PT treatment. Objective: See treatment diary below      Assessment: Tolerated treatment well. Patient demonstrated fatigue post treatment, exhibited good technique with therapeutic exercises and would benefit from continued PT      Plan: Continue per plan of care. Precautions: Hx of lung CA. Robotic Lobectomy  As tolerates.        Manuals                                                                Neuro Re-Ed             SLS Balance                                                                                            Ther Ex             Nu Step  L3x 10 min  L3 x 10 min  L3 x10 min          UBE    120  120 rpm  5'/5'          Hip adduction  5''20x 5" x20          Hip abduction   Grey 5''20x Noel  5" x20            LAQ   5''20x 5" x20          Step up   B 2x10 "B" 2x10 ea          Standing three way   10x each  x10 ea          HR TR   20x each  x20 ea          Mini Squat               SLR flexion   10x each  x10 ea          Gastroc stretch   30''3x 30" x3          Hamstring stretch   30''3x 30" x3          Piriformis stretch   30''3x 30" x3                                                                           Ther Activity                                       Gait Training                                       Modalities

## 2023-09-29 ENCOUNTER — OFFICE VISIT (OUTPATIENT)
Dept: PHYSICAL THERAPY | Facility: CLINIC | Age: 73
End: 2023-09-29
Payer: MEDICARE

## 2023-09-29 DIAGNOSIS — R53.81 PHYSICAL DECONDITIONING: Primary | ICD-10-CM

## 2023-09-29 PROCEDURE — 97110 THERAPEUTIC EXERCISES: CPT

## 2023-09-29 NOTE — PROGRESS NOTES
Daily Note     Today's date: 2023  Patient name: Sraah Mancini  : 1950  MRN: [id number]  Referring provider: Janel Flynn DO  Dx:   Encounter Diagnosis     ICD-10-CM    1. Physical deconditioning  R53.81                      Subjective: Pt denies any adverse effects following last visit. Pt's goal is to join gym. Objective: See treatment diary below      Assessment: Tolerated treatment well. Gradual progression with exercise as documented. Patient demonstrated fatigue post treatment, exhibited good technique with therapeutic exercises and would benefit from continued PT      Plan: Continue per plan of care. Precautions: Hx of lung CA. Robotic Lobectomy  As tolerates.        Manuals                                                               Neuro Re-Ed             SLS Balance                                                                                            Ther Ex             Nu Step  L3x 10 min  L3 x 10 min  L3 x10 min L4 x10 min         UBE   5/5 120  120 rpm  5'/5' 120 rpm  5'/5'         Hip adduction  5''20x 5" x20 5" x20         Hip abduction   Grey 5''20x Grey  5" x20   Grey  5" x20         LAQ   5''20x 5" x20 5" x30         Step up   B 2x10 "B" 2x10 ea "B"  2x10 ea         Standing three way   10x each  x10 ea x10 ea         HR TR   20x each  x20 ea x20 ea         Mini Squat               SLR flexion   10x each  x10 ea x15 ea         Gastroc stretch   30''3x 30" x3 30" x3 ea         Hamstring stretch   30''3x 30" x3 30" x3 ea         Piriformis stretch   30''3x 30" x3 30" x3 ea                                                                          Ther Activity                                       Gait Training                                       Modalities

## 2023-10-02 ENCOUNTER — OFFICE VISIT (OUTPATIENT)
Dept: PHYSICAL THERAPY | Facility: CLINIC | Age: 73
End: 2023-10-02
Payer: MEDICARE

## 2023-10-02 DIAGNOSIS — R53.81 PHYSICAL DECONDITIONING: Primary | ICD-10-CM

## 2023-10-02 PROCEDURE — 97110 THERAPEUTIC EXERCISES: CPT | Performed by: PHYSICAL THERAPIST

## 2023-10-02 NOTE — PROGRESS NOTES
Daily Note     Today's date: 10/2/2023  Patient name: Rachna Goodson  : 1950  MRN: [id number]  Referring provider: Ho Alamo DO  Dx:   Encounter Diagnosis     ICD-10-CM    1. Physical deconditioning  R53.81                      Subjective: Pt is doing well today. He offers no new complaints. Objective: See treatment diary below      Assessment: Tolerated treatment well. Patient exhibited good technique with therapeutic exercises and would benefit from continued PT      Plan: Continue per plan of care. Precautions: Hx of lung CA. Robotic Lobectomy  As tolerates.        Manuals  9/21 9/25 9/29 10/2                                                            Neuro Re-Ed             SLS Balance                                                                                            Ther Ex             Nu Step  L3x 10 min  L3 x 10 min  L3 x10 min L4 x10 min L4 x 10 min         UBE   5/5 120  120 rpm  5'/5' 120 rpm  5'/5' 120 5/5        Hip adduction  5''20x 5" x20 5" x20 5''20x        Hip abduction   Grey 5''20x Grey  5" x20   Grey  5" x20 Grey 5''20x        LAQ   5''20x 5" x20 5" x30 5''30x        Step up   B 2x10 "B" 2x10 ea "B"  2x10 ea B 2x10        Standing three way   10x each  x10 ea x10 ea 10x each         HR TR   20x each  x20 ea x20 ea 20x each         Mini Squat               SLR flexion   10x each  x10 ea x15 ea x15 each         Gastroc stretch   30''3x 30" x3 30" x3 ea 30''3x        Hamstring stretch   30''3x 30" x3 30" x3 ea 30''3x         Piriformis stretch   30''3x 30" x3 30" x3 ea 30''3x                                                                         Ther Activity                                       Gait Training                                       Modalities

## 2023-10-03 ENCOUNTER — TELEPHONE (OUTPATIENT)
Dept: HEMATOLOGY ONCOLOGY | Facility: CLINIC | Age: 73
End: 2023-10-03

## 2023-10-03 NOTE — TELEPHONE ENCOUNTER
I am reaching out regarding your appointment with Dr. Blake Woodward on 10/16/23    There has been a change to the providers schedule, and we will need to reschedule your appointment. I left a voicemail explaining to patient that this appointment will need to be rescheduled due to a change in the providers schedule.  Patient was advised to call [facility] [phone] to reschedule

## 2023-10-06 ENCOUNTER — OFFICE VISIT (OUTPATIENT)
Dept: PHYSICAL THERAPY | Facility: CLINIC | Age: 73
End: 2023-10-06
Payer: MEDICARE

## 2023-10-06 DIAGNOSIS — R53.81 PHYSICAL DECONDITIONING: Primary | ICD-10-CM

## 2023-10-06 PROCEDURE — 97110 THERAPEUTIC EXERCISES: CPT

## 2023-10-06 NOTE — PROGRESS NOTES
Daily Note     Today's date: 10/6/2023  Patient name: Kavin Navas  : 1950  MRN: [id number]  Referring provider: John Paul Sadler DO  Dx:   Encounter Diagnosis     ICD-10-CM    1. Physical deconditioning  R53.81                      Subjective: Pt reports he is doing ok, continues c/o LE weakness. Pt's goal is to transition to gym. States he walks his dogs 3 times a day. Objective: See treatment diary below      Assessment: Tolerated treatment well. Gradual progression with exercise as tolerated. Issued handouts and reviewed with patient for HEP. Patient demonstrated fatigue post treatment, exhibited good technique with therapeutic exercises and would benefit from continued PT. May consider gym machines next PT visit. Plan: Continue per plan of care. Precautions: Hx of lung CA. Robotic Lobectomy  As tolerates.        Manuals 9/21 9/25 9/29 10/2 10/6                                   Neuro Re-Ed        SLS Balance                                                         Ther Ex        Nu Step  L3 x 10 min  L3 x10 min L4 x10 min L4 x 10 min  L5 x10 min   UBE  5/5 120  120 rpm  5'/5' 120 rpm  5'/5' 120 5/5 100 rpm  5'/5'   Hip adduction 5''20x 5" x20 5" x20 5''20x 5" x20   Hip abduction  Noel 5''20x Grey  5" x20   Grey  5" x20 Grey 5''20x Grey  5" x 20   LAQ  5''20x 5" x20 5" x30 5''30x 1# 5" 3x10   Step up  B 2x10 "B" 2x10 ea "B"  2x10 ea B 2x10 "B"  1# 2x10   Standing three way  10x each  x10 ea x10 ea 10x each  1# 2x10   HR TR  20x each  x20 ea x20 ea 20x each  xx20    Mini Squat          SLR flexion  10x each  x10 ea x15 ea x15 each  2x10 ea   Gastroc stretch  30''3x 30" x3 30" x3 ea 30''3x 30" x3   Hamstring stretch  30''3x 30" x3 30" x3 ea 30''3x  30" x3   Piriformis stretch  30''3x 30" x3 30" x3 ea 30''3x 30" x3                                           Ther Activity                        Gait Training                        Modalities                            Access Code: [email or web address]  URL: [email or web address]  Date: 10/06/2023  Prepared by:  Bruce Holter Collevechio    Exercises  - Standing Gastroc Stretch on Foam 1/2 Roll  - 1 x daily - 7 x weekly - 1 sets - 3 reps - 30" hold  - Supine Hamstring Stretch with Strap  - 1 x daily - 7 x weekly - 1 sets - 3 reps - 30" hold  - Supine Piriformis Stretch with Foot on Ground  - 1 x daily - 7 x weekly - 3 sets - 10 reps - 30" hold  - Active Straight Leg Raise with Quad Set  - 1 x daily - 3 x weekly - 2 sets - 10 reps  - Supine Hip Adduction Isometric with Ball  - 1 x daily - 3 x weekly - 3 sets - 10 reps - 5" hold  - Hooklying Abduction with Resistance  - 1 x daily - 3 x weekly - 3 sets - 10 reps - 5" hold  - Heel Toe Raises with Counter Support  - 1 x daily - 3 x weekly - 2 sets - 10 reps

## 2023-10-09 ENCOUNTER — APPOINTMENT (EMERGENCY)
Dept: CT IMAGING | Facility: HOSPITAL | Age: 73
End: 2023-10-09
Payer: MEDICARE

## 2023-10-09 ENCOUNTER — EVALUATION (OUTPATIENT)
Dept: PHYSICAL THERAPY | Facility: CLINIC | Age: 73
End: 2023-10-09
Payer: MEDICARE

## 2023-10-09 ENCOUNTER — APPOINTMENT (EMERGENCY)
Dept: MRI IMAGING | Facility: HOSPITAL | Age: 73
End: 2023-10-09
Payer: MEDICARE

## 2023-10-09 ENCOUNTER — HOSPITAL ENCOUNTER (EMERGENCY)
Facility: HOSPITAL | Age: 73
Discharge: HOME | End: 2023-10-09
Attending: EMERGENCY MEDICINE | Admitting: EMERGENCY MEDICINE
Payer: MEDICARE

## 2023-10-09 VITALS
SYSTOLIC BLOOD PRESSURE: 142 MMHG | RESPIRATION RATE: 20 BRPM | HEART RATE: 62 BPM | OXYGEN SATURATION: 98 % | WEIGHT: 170 LBS | DIASTOLIC BLOOD PRESSURE: 81 MMHG | BODY MASS INDEX: 24.39 KG/M2 | TEMPERATURE: 97 F

## 2023-10-09 DIAGNOSIS — R53.81 PHYSICAL DECONDITIONING: Primary | ICD-10-CM

## 2023-10-09 DIAGNOSIS — Q67.0 FACIAL ASYMMETRY: Primary | ICD-10-CM

## 2023-10-09 DIAGNOSIS — R09.89 UNKNOWN WHEN SUSPECTED STROKE PATIENT WAS LAST WELL: ICD-10-CM

## 2023-10-09 LAB
ANION GAP SERPL CALCULATED.3IONS-SCNC: 9 MMOL/L
BASOPHILS # BLD AUTO: 0.03 THOUSANDS/ÂΜL (ref 0–0.1)
BASOPHILS NFR BLD AUTO: 1 % (ref 0–1)
BUN SERPL-MCNC: 19 MG/DL (ref 5–25)
CALCIUM SERPL-MCNC: 9.5 MG/DL (ref 8.4–10.2)
CHLORIDE SERPL-SCNC: 104 MMOL/L (ref 96–108)
CO2 SERPL-SCNC: 28 MMOL/L (ref 21–32)
CREAT SERPL-MCNC: 1.19 MG/DL (ref 0.6–1.3)
EOSINOPHIL # BLD AUTO: 0.27 THOUSAND/ÂΜL (ref 0–0.61)
EOSINOPHIL NFR BLD AUTO: 5 % (ref 0–6)
ERYTHROCYTE [DISTWIDTH] IN BLOOD BY AUTOMATED COUNT: 14.4 % (ref 11.6–15.1)
GFR SERPL CREATININE-BSD FRML MDRD: 60 ML/MIN/1.73SQ M
GLUCOSE SERPL-MCNC: 94 MG/DL (ref 65–140)
HCT VFR BLD AUTO: 44.3 % (ref 36.5–49.3)
HGB BLD-MCNC: 14 G/DL (ref 12–17)
IMM GRANULOCYTES # BLD AUTO: 0.03 THOUSAND/UL (ref 0–0.2)
IMM GRANULOCYTES NFR BLD AUTO: 1 % (ref 0–2)
LYMPHOCYTES # BLD AUTO: 1.65 THOUSANDS/ÂΜL (ref 0.6–4.47)
LYMPHOCYTES NFR BLD AUTO: 31 % (ref 14–44)
MCH RBC QN AUTO: 31.7 PG (ref 26.8–34.3)
MCHC RBC AUTO-ENTMCNC: 31.6 G/DL (ref 31.4–37.4)
MCV RBC AUTO: 101 FL (ref 82–98)
MONOCYTES # BLD AUTO: 0.41 THOUSAND/ÂΜL (ref 0.17–1.22)
MONOCYTES NFR BLD AUTO: 8 % (ref 4–12)
NEUTROPHILS # BLD AUTO: 2.93 THOUSANDS/ÂΜL (ref 1.85–7.62)
NEUTS SEG NFR BLD AUTO: 54 % (ref 43–75)
NRBC BLD AUTO-RTO: 0 /100 WBCS
PLATELET # BLD AUTO: 166 THOUSANDS/UL (ref 149–390)
PMV BLD AUTO: 9.9 FL (ref 8.9–12.7)
POTASSIUM SERPL-SCNC: 4 MMOL/L (ref 3.5–5.3)
RBC # BLD AUTO: 4.41 MILLION/UL (ref 3.88–5.62)
SODIUM SERPL-SCNC: 141 MMOL/L (ref 135–147)
WBC # BLD AUTO: 5.32 THOUSAND/UL (ref 4.31–10.16)

## 2023-10-09 PROCEDURE — 36415 COLL VENOUS BLD VENIPUNCTURE: CPT | Performed by: EMERGENCY MEDICINE

## 2023-10-09 PROCEDURE — 97110 THERAPEUTIC EXERCISES: CPT | Performed by: PHYSICAL THERAPIST

## 2023-10-09 PROCEDURE — 80048 BASIC METABOLIC PNL TOTAL CA: CPT | Performed by: EMERGENCY MEDICINE

## 2023-10-09 PROCEDURE — 70551 MRI BRAIN STEM W/O DYE: CPT

## 2023-10-09 PROCEDURE — 99285 EMERGENCY DEPT VISIT HI MDM: CPT | Performed by: EMERGENCY MEDICINE

## 2023-10-09 PROCEDURE — 85025 COMPLETE CBC W/AUTO DIFF WBC: CPT | Performed by: EMERGENCY MEDICINE

## 2023-10-09 PROCEDURE — 70450 CT HEAD/BRAIN W/O DYE: CPT

## 2023-10-09 PROCEDURE — 99284 EMERGENCY DEPT VISIT MOD MDM: CPT

## 2023-10-09 PROCEDURE — G1004 CDSM NDSC: HCPCS

## 2023-10-09 NOTE — ED PROVIDER NOTES
History  Chief Complaint   Patient presents with   • Facial Droop     Patient arrives with complaints of right sided facial droop and slurred speech for the past 2- 3 weeks that started with a headache.      70-year-old male presents with right-sided facial droop and slurred speech for about 3 weeks. Patient says symptoms started with a headache around that time which is since resolved. He says at times people with complaint that they noticed slurred speech as well. He denies gait disturbance, arm or upper extremity weakness or numbness. None       Past Medical History:   Diagnosis Date   • BPH with obstruction/lower urinary tract symptoms    • Cystitis, chronic    • Epididymo-orchitis    • Feeling of incomplete bladder emptying    • Frequency of micturition    • Nocturia    • Other microscopic hematuria    • Poor urinary stream    • Simple renal cyst        Past Surgical History:   Procedure Laterality Date   • CYSTOSCOPY  2012   • FRACTURE SURGERY      legs, arms, shoulder, back after accident   • IR BIOPSY LUNG  02/23/2023   •  [address] INCL FLUOR GDNCE DX W/CELL WASHG [facility] N/A 6/8/2023    Procedure: Leach Settler;  Surgeon: Eugene Watson MD;  Location: [facility];  Service: Thoracic   • SD THORACOSCOPY W/LOBECTOMY SINGLE LOBE Left 6/8/2023    Procedure: Left robotic assisted lower lobectomy w/ mediastinal lymph node dissection;  Surgeon: Eugene Watson MD;  Location: [facility];  Service: Thoracic       Family History   Problem Relation Age of Onset   • Diabetes Mother    • Hypertension Mother    • No Known Problems Father      I have reviewed and agree with the history as documented.     E-Cigarette/Vaping   • E-Cigarette Use Never User      E-Cigarette/Vaping Substances   • Nicotine No    • THC No    • CBD No    • Flavoring No    • Other No    • Unknown No      Social History     Tobacco Use   • Smoking status: Former     Packs/day: 1.00     Years: 60.00     Total pack years: 60.00 Types: Cigarettes     Quit date: 3/3/2023     Years since quittin.6   • Smokeless tobacco: Never   Vaping Use   • Vaping Use: Never used   Substance Use Topics   • Alcohol use: Not Currently   • Drug use: Never       Review of Systems    Physical Exam  Physical Exam  Vitals and nursing note reviewed. Constitutional:       Appearance: Normal appearance. He is well-developed. HENT:      Head: Normocephalic and atraumatic. Eyes:      Conjunctiva/sclera: Conjunctivae normal.      Pupils: Pupils are equal, round, and reactive to light. Neck:      Trachea: No tracheal deviation. Cardiovascular:      Rate and Rhythm: Normal rate and regular rhythm. Heart sounds: Normal heart sounds. No murmur heard. Pulmonary:      Effort: Pulmonary effort is normal. No respiratory distress. Breath sounds: Normal breath sounds. No wheezing or rales. Abdominal:      General: Bowel sounds are normal. There is no distension. Palpations: Abdomen is soft. Tenderness: There is no abdominal tenderness. Musculoskeletal:         General: No deformity. Cervical back: Normal range of motion and neck supple. Skin:     General: Skin is warm and dry. Capillary Refill: Capillary refill takes less than 2 seconds. Neurological:      Mental Status: He is alert and oriented to person, place, and time. Sensory: No sensory deficit. Comments: Slight facial asymmetry with mild right-sided facial droop however easily overcome with smiling. Closes eyes fully, no weakness above the eye.   Normal neurologic exam otherwise   Psychiatric:         Judgment: Judgment normal.         Vital Signs  ED Triage Vitals [10/09/23 0807]   Temperature Pulse Respirations Blood Pressure SpO2   (!) 97 °F (36.1 °C) (!) 50 18 138/88 96 %      Temp Source Heart Rate Source Patient Position - Orthostatic VS BP Location FiO2 (%)   Tympanic Monitor Sitting Left arm --      Pain Score       No Pain           Vitals: 10/09/23 0807 10/09/23 1159   BP: 138/88 142/81   Pulse: (!) 50 62   Patient Position - Orthostatic VS: Sitting          Visual Acuity      ED Medications  Medications - No data to display    Diagnostic Studies  Results Reviewed     Procedure Component Value Units Date/Time    Basic metabolic panel [[id number]] Collected: 10/09/23 0832    Lab Status: Final result Specimen: Blood from Arm, Right Updated: 10/09/23 0856     Sodium 141 mmol/L      Potassium 4.0 mmol/L      Chloride 104 mmol/L      CO2 28 mmol/L      ANION GAP 9 mmol/L      BUN 19 mg/dL      Creatinine 1.19 mg/dL      Glucose 94 mg/dL      Calcium 9.5 mg/dL      eGFR 60 ml/min/1.73sq m     Narrative:      [place] guidelines for Chronic Kidney Disease (CKD):   •  Stage 1 with normal or high GFR (GFR > 90 mL/min/1.73 square meters)  •  Stage 2 Mild CKD (GFR = 60-89 mL/min/1.73 square meters)  •  Stage 3A Moderate CKD (GFR = 45-59 mL/min/1.73 square meters)  •  Stage 3B Moderate CKD (GFR = 30-44 mL/min/1.73 square meters)  •  Stage 4 Severe CKD (GFR = 15-29 mL/min/1.73 square meters)  •  Stage 5 End Stage CKD (GFR <15 mL/min/1.73 square meters)  Note: GFR calculation is accurate only with a steady state creatinine    CBC and differential [[id number]]  (Abnormal) Collected: 10/09/23 0832    Lab Status: Final result Specimen: Blood from Arm, Right Updated: 10/09/23 0839     WBC 5.32 Thousand/uL      RBC 4.41 Million/uL      Hemoglobin 14.0 g/dL      Hematocrit 44.3 %       fL      MCH 31.7 pg      MCHC 31.6 g/dL      RDW 14.4 %      MPV 9.9 fL      Platelets 591 Thousands/uL      nRBC 0 /100 WBCs      Neutrophils Relative 54 %      Immat GRANS % 1 %      Lymphocytes Relative 31 %      Monocytes Relative 8 %      Eosinophils Relative 5 %      Basophils Relative 1 %      Neutrophils Absolute 2.93 Thousands/µL      Immature Grans Absolute 0.03 Thousand/uL      Lymphocytes Absolute 1.65 Thousands/µL      Monocytes Absolute 0.41 Thousand/µL      Eosinophils Absolute 0.27 Thousand/µL      Basophils Absolute 0.03 Thousands/µL                  MRI brain wo contrast   Final Result by Aviva Fields MD (10/09 1124)      No mass effect, acute intracranial hemorrhage or evidence of recent infarction. Mild chronic microvascular ischemic change. Workstation performed: [id number]         CT head without contrast   Final Result by Trina Patten MD (10/09 6046)      No acute intracranial abnormality. Mild left maxillary sinus disease. Workstation performed: [id number]                    Procedures  Procedures         ED Course  ED Course as of 10/09/23 1535   Mon Oct 09, 2023   1003 Syed with Dr. Gerald Giraldo 22yo+    Flowsheet Row Most Recent Value   Initial Alcohol Screen: US AUDIT-C     1. How often do you have a drink containing alcohol? 0 Filed at: 10/09/2023 0809   2. How many drinks containing alcohol do you have on a typical day you are drinking? 0 Filed at: 10/09/2023 0809   3a. Male UNDER 65: How often do you have five or more drinks on one occasion? 0 Filed at: 10/09/2023 0809   3b. FEMALE Any Age, or MALE 65+: How often do you have 4 or more drinks on one occassion? 0 Filed at: 10/09/2023 0809   Audit-C Score 0 Filed at: 10/09/2023 9300   HILARIA: How many times in the past year have you. .. Used an illegal drug or used a prescription medication for non-medical reasons? Never Filed at: 10/09/2023 5884                    Medical Decision Making  77-year-old male with minimal neurologic symptoms however they are suspicious for upper motor neuron disease and concerning for acute CVA however now several weeks out. Symptoms are barely perceptible but noticeable, onset probably would not notice him if he did not point them out. He does not have any dysarthria at this time. [place] also includes intracranial hemorrhage however this is less likely.     CT of the head was performed to rule out subacute hemorrhage. This study was reviewed and interpreted independent by me, no acute disease. Labs ordered to look for electrolyte abnormality, anemia and these were also reassuring. Discussed  with Dr. Alexander Rao from neurology regarding patient's symptoms and concern for acute CVA. Due to the delayed presentation I would like to try to avoid admission if possible. Dr. Alexander Rao agrees, there is availability for an MRI today which was ordered. The study overall was reassuring so for some mild microangiopathic changes. Patient stable for discharge. Symptoms may be due to residual symptoms from Bell's palsy or may be just facial he says similar to that was finally noticed. Facial asymmetry: acute illness or injury  Unknown when suspected stroke patient was last well: acute illness or injury  Amount and/or Complexity of Data Reviewed  Labs: ordered. Decision-making details documented in ED Course. Radiology: ordered and independent interpretation performed. Decision-making details documented in ED Course. Risk  Decision regarding hospitalization. Diagnosis or treatment significantly limited by social determinants of health. Disposition  Final diagnoses:   Facial asymmetry   Unknown when suspected stroke patient was last well     Time reflects when diagnosis was documented in both MDM as applicable and the Disposition within this note     Time User Action Codes Description Comment    10/9/2023 11:51 AM Cary Kaye Add [Q67.0] Facial asymmetry     10/9/2023 11:51 AM Cary Kaye Add [R09.89] Unknown when suspected stroke patient was last well       ED Disposition     ED Disposition   Discharge    Condition   Stable    Date/Time   Mon Oct 9, 2023 11:51 AM    Comment   Beula Denise discharge to home/self care.                Follow-up Information     Follow up With Specialties Details Why Contact Info    Luis Ray,  Family Medicine Schedule an appointment as soon as possible for a visit   [address] [place] [address]  [phone]            There are no discharge medications for this patient. No discharge procedures on file.     PDMP Review     None          ED Provider  Electronically Signed by           Rito Orantes DO  10/09/23 1894

## 2023-10-09 NOTE — DISCHARGE INSTRUCTIONS
Your MRI was reassuring against any acute stroke. Unknown cause of your symptoms, it may be residual from Bell's palsy.     I recommend you follow up with your primary physician

## 2023-10-09 NOTE — PROGRESS NOTES
PT Progress Note      Today's date: 10/9/2023  Patient name: Deshawn Dejesus  : 1950  MRN: [id number]  Referring provider: Jovanni Del Rosario DO  Dx:   Encounter Diagnosis     ICD-10-CM    1. Physical deconditioning  R53.81                Assessment  Assessment details: Deshawn Dejesus is a 68 y.o. male who presents to outpatient PT with a  Physical deconditioning  (primary encounter diagnosis). No further referral appears necessary at this time based upon examination results. Pt presents with decreased strength, ROM, balance, functional activity tolerance, and pain with movement in his bilateral LE's which is  limiting his ability to perform the aforementioned functional activities. Etiologic factors include repetitive poor body mechanics. Prognosis is good given HEP compliance and PT 2/wk. Please contact me if you have any questions or recommendations. Thank you for the opportunity to share in  [facility]. Impairments: abnormal coordination, abnormal muscle firing, abnormal or restricted ROM, activity intolerance, impaired physical strength, lacks appropriate home exercise program, pain with function and safety issue    Symptom irritability: moderateUnderstanding of Dx/Px/POC: good   Prognosis: good    Goals  STG to be achieved in 4 weeks     1. Pt will reduce 5x sit to stand scores to 12 seconds indicating improved LE strength. 2. Pt will improve MMT scores by at least 1/2 grade to promote improved functional activity tolerance     LTG to be achieved in 6-8 weeks   1. Pt will be complaint with HEP   2. Pt will improve ROM to [facility], to help facilitate independence with ADL's, IADL's, and functional activities   3. Pt will improve Strength to [facility] to help facilitate independence with ADL's, IADL's, and functional activities   4. Pt will have no limitations with ambulation to help facilitate independence at home and in the community.    5. Pt will have no limitations with stair negotiation to help facilitate independence at home and in the community           Plan  Patient would benefit from: skilled physical therapy  Planned therapy interventions: ADL training, balance, balance/weight bearing training, flexibility, functional ROM exercises, gait training, graded activity, graded exercise, graded motor, home exercise program, joint mobilization, manual therapy, neuromuscular re-education, patient education, postural training, strengthening, stretching, therapeutic activities and therapeutic exercise  Frequency: 2x week  Duration in weeks: 12  Plan of Care beginning date: 9/18/2023  Plan of Care expiration date: 12/18/2023  Treatment plan discussed with: patient, PTA and referring physician        Subjective Evaluation    History of Present Illness  Mechanism of injury: The patient is a 68year old male who presents  To outpatient PT with chief complaint of general deconditioning he does have past history of lung CA, underwent robotic lobectomy on 6/8   He recently completed chemotherapy treatment in April of this year. Per patient  able to do activity as tolerated. He reports that ever since his chemotherapy his legs have felt weaker than usual. He is hoping to improve his strength and endurance at PT. RA 10/9     The patient reports to PT this date with a new complaint. He reports that he believes he may have had a small stroke. He also notes that ever since he was done with chemotherapy and started on prednisone, he has not been the same. He has not mentioned this to anyone other than his wife until now. He states that he doesn't feel himself. He feels We held PT today secondary to his comments. He reports he is doing well and feels better when he is done with PT. His BP was 145/90 HR 47. Coordination testing is WFL, No facial asymmetries. Due to his subjective comments, We will Hold PT. I advised him to go to the ED. He is in agreement with the plan.        Patient Goals  Patient goals for therapy: increased strength, decreased pain and increased motion  Patient goal: Improve strength in legs. Pain  Current pain rating: 3 (B/L LE)  At best pain rating: 3  At worst pain ratin  Quality: dull ache  Relieving factors: relaxation, rest, support and medications  Aggravating factors: standing, walking, stair climbing and lifting  Progression: no change    Treatments  Current treatment: physical therapy        Objective     Active Range of Motion   Left Hip   Flexion: WFL  Abduction: WFL  Adduction: WFL    Right Hip   Flexion: WFL  Abduction: WFL  Adduction: WFL  Left Knee   Flexion: WFL  Extension: WFL    Right Knee   Flexion: WFL  Extension: WFL  Left Ankle/Foot   Dorsiflexion (kf): WFL  Plantar flexion: WFL    Right Ankle/Foot   Dorsiflexion (kf): WFL  Plantar flexion: WFL    Strength/Myotome Testing     Left Hip   Planes of Motion   Flexion: 4-  Abduction: 4-  Adduction: 4-    Right Hip   Planes of Motion   Flexion: 4-  Abduction: 4-  Adduction: 4-    Left Knee   Flexion: 4-  Extension: 4-    Right Knee   Flexion: 4-  Extension: 4-    Left Ankle/Foot   Dorsiflexion: 4-  Plantar flexion: 4-    Right Ankle/Foot   Dorsiflexion: 4-  Plantar flexion: 4-    Functional Assessment        Comments  5x sit to stand - 14 seconds     6 MWT - 1280 feet, Spo2 99, Able to complete 6 minutes. (S) with ambulation. No AD. Precautions: Hx of lung CA. Robotic Lobectomy  As tolerates.        Manuals 10/9 9/25 9/29 10/2 10/6                                   Neuro Re-Ed        SLS Balance                                                         Ther Ex        Nu Step  L3 x 10 min  L3 x10 min L4 x10 min L4 x 10 min  L5 x10 min   UBE   120 rpm  5'/5' 120 rpm  5'/5' 120 5/5 100 rpm  5'/5'   Hip adduction  5" x20 5" x20 5''20x 5" x20   Hip abduction   Noel  5" x20   Noel  5" x20 Grey 5''20x Grey  5" x 20   LAQ   5" x20 5" x30 5''30x 1# 5" 3x10   Step up   "B" 2x10 ea "B"  2x10 ea B 2x10 "B"  1# 2x10   Standing three way   x10 ea x10 ea 10x each  1# 2x10   HR TR   x20 ea x20 ea 20x each  xx20    Mini Squat          SLR flexion   x10 ea x15 ea x15 each  2x10 ea   Gastroc stretch   30" x3 30" x3 ea 30''3x 30" x3   Hamstring stretch   30" x3 30" x3 ea 30''3x  30" x3   Piriformis stretch   30" x3 30" x3 ea 30''3x 30" x3                                           Ther Activity                        Gait Training                        Modalities

## 2023-10-12 ENCOUNTER — OFFICE VISIT (OUTPATIENT)
Dept: FAMILY MEDICINE CLINIC | Facility: CLINIC | Age: 73
End: 2023-10-12
Payer: MEDICARE

## 2023-10-12 VITALS
DIASTOLIC BLOOD PRESSURE: 84 MMHG | BODY MASS INDEX: 25.77 KG/M2 | HEART RATE: 79 BPM | WEIGHT: 180 LBS | OXYGEN SATURATION: 98 % | SYSTOLIC BLOOD PRESSURE: 124 MMHG | HEIGHT: 70 IN | TEMPERATURE: 97 F

## 2023-10-12 DIAGNOSIS — C34.92 ADENOCARCINOMA, LUNG, LEFT (HCC): ICD-10-CM

## 2023-10-12 DIAGNOSIS — Q67.0 FACIAL ASYMMETRY: Primary | ICD-10-CM

## 2023-10-12 PROCEDURE — 99214 OFFICE O/P EST MOD 30 MIN: CPT

## 2023-10-12 RX ORDER — PREDNISONE 10 MG/1
30 TABLET ORAL 2 TIMES DAILY WITH MEALS
Qty: 30 TABLET | Refills: 0 | Status: CANCELLED | OUTPATIENT
Start: 2023-10-12 | End: 2023-10-17

## 2023-10-12 NOTE — PROGRESS NOTES
Name: Rusty Nuñez      : 1950      MRN: [id number]  Encounter Provider: MAXWELL Schumacher  Encounter Date: 10/12/2023   Encounter department: [facility] .[facility]. 60     1. Facial asymmetry  Comments:  Discussed possibility of Bell's Palsy and subsequent treatment. Patient adamently refuses Prednisone. 2. Adenocarcinoma, lung, left ([address])    - Pt states he was treated for multiple weeks with Prednisone for a drug-induced colitis and that he will never take steroids again. - Counseled on possibility of Bell's Palsy and treatment of choice. - Discussed scheduled f/u with Heme Onc.  - Call my office with any questions/concerns. Depression Screening and Follow-up Plan: Patient was screened for depression during today's encounter. They screened negative with a PHQ-2 score of 0. Shirley Montero presents today for an ER follow-up occurring on 10/9/2023. His PMH includes adenocarcinoma s/p robotic-assisted left lower lobectomy 2023 and chemotherapy since 3/2023, BPH, and HCL. He states his symptoms are unchanged since his ER visit. He denies new neurological symptoms. He states "this is all from the chemo."      Review of Systems   Constitutional:  Negative for chills, fatigue and fever. HENT:  Negative for congestion, ear discharge, ear pain, facial swelling, rhinorrhea, sinus pressure, sinus pain, sore throat and trouble swallowing. Eyes:  Negative for photophobia, pain and visual disturbance. Respiratory:  Negative for cough, chest tightness, shortness of breath and wheezing. Cardiovascular:  Negative for chest pain, palpitations and leg swelling. Gastrointestinal:  Negative for abdominal pain, diarrhea, nausea and vomiting. Genitourinary:  Negative for dysuria, flank pain and hematuria. Musculoskeletal:  Negative for arthralgias, back pain, myalgias and neck pain. Skin:  Negative for pallor and wound.    Neurological: Positive for facial asymmetry. Negative for dizziness, syncope, weakness, numbness and headaches. Psychiatric/Behavioral:  Negative for confusion and sleep disturbance. All other systems reviewed and are negative. No current outpatient medications on file prior to visit. Objective     /84   Pulse 79   Temp (!) 97 °F (36.1 °C)   Ht 5' 10" (1.778 m)   Wt 81.6 kg (180 lb)   SpO2 98%   BMI 25.83 kg/m²     Physical Exam  Vitals reviewed. Constitutional:       General: He is not in acute distress. Appearance: He is normal weight. He is not ill-appearing or toxic-appearing. HENT:      Head: Normocephalic. Right Ear: Tympanic membrane normal. No middle ear effusion. Tympanic membrane is not erythematous or bulging. Left Ear: Tympanic membrane normal.  No middle ear effusion. Tympanic membrane is not erythematous or bulging. Nose: Nose normal.      Right Sinus: No maxillary sinus tenderness or frontal sinus tenderness. Left Sinus: No maxillary sinus tenderness or frontal sinus tenderness. Mouth/Throat:      Mouth: Mucous membranes are moist.      Pharynx: Uvula midline. No oropharyngeal exudate, posterior oropharyngeal erythema or uvula swelling. Tonsils: No tonsillar exudate or tonsillar abscesses. Eyes:      Extraocular Movements: Extraocular movements intact. Conjunctiva/sclera: Conjunctivae normal.      Pupils: Pupils are equal, round, and reactive to light. Cardiovascular:      Rate and Rhythm: Normal rate and regular rhythm. Pulses: Normal pulses. Heart sounds: Normal heart sounds. Pulmonary:      Effort: Pulmonary effort is normal. No tachypnea or respiratory distress. Breath sounds: Normal breath sounds and air entry. No decreased breath sounds, wheezing, rhonchi or rales. Abdominal:      General: Bowel sounds are normal.      Palpations: Abdomen is soft. Tenderness: There is no abdominal tenderness.    Musculoskeletal: General: Normal range of motion. Cervical back: Normal range of motion and neck supple. Lymphadenopathy:      Cervical: No cervical adenopathy. Skin:     General: Skin is warm and dry. Capillary Refill: Capillary refill takes less than 2 seconds. Neurological:      General: No focal deficit present. Mental Status: He is alert and oriented to person, place, and time. GCS: GCS eye subscore is 4. GCS verbal subscore is 5. GCS motor subscore is 6. Cranial Nerves: Facial asymmetry (R - mild; would not have been recognizable if patient did not point it out) present. No cranial nerve deficit. Sensory: Sensation is intact. Motor: Motor function is intact. No weakness. Coordination: Coordination is intact. Gait: Gait is intact. Deep Tendon Reflexes: Reflexes are normal and symmetric.       Comments:   No evidence of weakness  No sensory loss  Normal gait & station  [facility], [address] [place] [address]

## 2023-10-13 ENCOUNTER — OFFICE VISIT (OUTPATIENT)
Dept: PHYSICAL THERAPY | Facility: CLINIC | Age: 73
End: 2023-10-13
Payer: MEDICARE

## 2023-10-13 DIAGNOSIS — R53.81 PHYSICAL DECONDITIONING: Primary | ICD-10-CM

## 2023-10-13 PROCEDURE — 97110 THERAPEUTIC EXERCISES: CPT

## 2023-10-13 NOTE — PROGRESS NOTES
Daily Note     Today's date: 10/13/2023  Patient name: Daniel Foreman  : 1950  MRN: [id number]  Referring provider: Frieda Dhaliwal DO  Dx:   Encounter Diagnosis     ICD-10-CM    1. Physical deconditioning  R53.81                      Subjective: Pt reports he was seen in ED and had MRI and CT Scan negative for stroke. States he was told he has residual symptoms of [place] Palsy from chemo. Did have follow up appt with PCP. Objective: See treatment diary below      Assessment: Tolerated treatment well. BP today prior to treatment 131/83. Resume exercises as per POC. Patient demonstrated fatigue post treatment, exhibited good technique with therapeutic exercises, and would benefit from continued PT. Plan: Continue per plan of care. Precautions: Hx of lung CA. Robotic Lobectomy  As tolerates.        Manuals 10/9 10/13 9/29 10/2 10/6                                   Neuro Re-Ed        SLS Balance                                                         Ther Ex        Nu Step  L3 x 10 min  L5x10 min L4 x10 min L4 x 10 min  L5 x10 min   UBE   120 rpm  5'/5' 120 rpm  5'/5' 120 5/5 100 rpm  5'/5'   Hip adduction  5" x20 5" x20 5''20x 5" x20   Hip abduction   Noel  5" x20   Noel  5" x20 Grey 5''20x Grey  5" x 20   LAQ   1# 5" x20 5" x30 5''30x 1# 5" 3x10   Step up   "B" 1# 2x10 ea "B"  2x10 ea B 2x10 "B"  1# 2x10   Standing three way   1# 2x10 ea x10 ea 10x each  1# 2x10   HR TR   x20 ea x20 ea 20x each  x20    Mini Squat          SLR flexion   x10 ea x15 ea x15 each  2x10 ea   Gastroc stretch   30" x3 30" x3 ea 30''3x 30" x3   Hamstring stretch   30" x3 30" x3 ea 30''3x  30" x3   Piriformis stretch   30" x3 30" x3 ea 30''3x 30" x3                                           Ther Activity                        Gait Training                        Modalities

## 2023-10-16 ENCOUNTER — OFFICE VISIT (OUTPATIENT)
Dept: HEMATOLOGY ONCOLOGY | Facility: MEDICAL CENTER | Age: 73
End: 2023-10-16
Payer: MEDICARE

## 2023-10-16 VITALS
HEIGHT: 70 IN | SYSTOLIC BLOOD PRESSURE: 132 MMHG | BODY MASS INDEX: 26.03 KG/M2 | OXYGEN SATURATION: 98 % | DIASTOLIC BLOOD PRESSURE: 76 MMHG | HEART RATE: 60 BPM | WEIGHT: 181.8 LBS | RESPIRATION RATE: 17 BRPM | TEMPERATURE: 98.3 F

## 2023-10-16 DIAGNOSIS — C34.92 ADENOCARCINOMA, LUNG, LEFT (HCC): Primary | ICD-10-CM

## 2023-10-16 PROCEDURE — 99214 OFFICE O/P EST MOD 30 MIN: CPT | Performed by: INTERNAL MEDICINE
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
## 2023-10-20 ENCOUNTER — OFFICE VISIT (OUTPATIENT)
Dept: PHYSICAL THERAPY | Facility: CLINIC | Age: 73
End: 2023-10-20
Payer: MEDICARE

## 2023-10-20 DIAGNOSIS — R53.81 PHYSICAL DECONDITIONING: Primary | ICD-10-CM

## 2023-10-20 PROCEDURE — 97110 THERAPEUTIC EXERCISES: CPT

## 2023-10-20 NOTE — PROGRESS NOTES
Daily Note     Today's date: 10/20/2023  Patient name: Stephanie Walker  : 1950  MRN: [id number]  Referring provider: Luis Ray DO  Dx:   Encounter Diagnosis     ICD-10-CM    1. Physical deconditioning  R53.81           Start Time: 0700          Subjective: Pt has no new c/o. Pt's goal is to join gym. Objective: See treatment diary below      Assessment: Tolerated treatment well. BP prior to exercise 134/86 mmHg. Gradual progression with PREs. Patient exhibited good technique with therapeutic exercises and would benefit from continued PT for strengthening. Plan: Continue per plan of care. Precautions: Hx of lung CA. Robotic Lobectomy  As tolerates.        Manuals 10/9 10/13 10/20 10/2 10/6                                   Neuro Re-Ed        SLS Balance                                                         Ther Ex        Nu Step  L3 x 10 min  L5x10 min L5x10 min L4 x 10 min  L5 x10 min   UBE   120 rpm  5'/5' 100 rpm  5'/5' 120 5/5 100 rpm  5'/5'   Hip adduction  5" x20 5" x20 5''20x 5" x20   Hip abduction   Noel  5" x20   Noel  5" x20 Grey 5''20x Grey  5" x 20   LAQ   1# 5" x20 5" 1.5# x30 5''30x 1# 5" 3x10   Step up   "B" 1# 2x10 ea "B"  1.5# 2x10 ea B 2x10 "B"  1# 2x10   Standing three way   1# 2x10 ea x10 ea 10x each  1# 2x10   HR TR   x20 ea x20 ea 20x each  x20    Mini Squat          SLR flexion   x10 ea 1.5# 2 x 10  ea x15 each  2x10 ea   Gastroc stretch   30" x3 Standing incline board  30" x3 30''3x 30" x3   Hamstring stretch   30" x3 30" x3 ea 30''3x  30" x3   Piriformis stretch   30" x3 30" x3 ea 30''3x 30" x3                                           Ther Activity                        Gait Training                        Modalities

## 2023-10-23 ENCOUNTER — OFFICE VISIT (OUTPATIENT)
Dept: PHYSICAL THERAPY | Facility: CLINIC | Age: 73
End: 2023-10-23
Payer: MEDICARE

## 2023-10-23 DIAGNOSIS — R53.81 PHYSICAL DECONDITIONING: Primary | ICD-10-CM

## 2023-10-23 PROCEDURE — 97110 THERAPEUTIC EXERCISES: CPT

## 2023-10-23 NOTE — PROGRESS NOTES
Daily Note     Today's date: 10/23/2023  Patient name: Kaur Bass  : 1950  MRN: [id number]  Referring provider: Bambi Schilder, DO  Dx:   Encounter Diagnosis     ICD-10-CM    1. Physical deconditioning  R53.81                      Subjective: Pt reports feels he will do well with transition to gym maintenance program at this time. Objective: See treatment diary below      Assessment: Tolerated treatment well. Initiated knee extension and knee flexion gym machine today under direction of DPT. Patient exhibited good technique with therapeutic exercises      Plan: Pt to be D/c'd from PT by DPT. Pt plans to join gym to continue with maintenance program.     Precautions: Hx of lung CA. Robotic Lobectomy  As tolerates.        Manuals 10/9 10/13 10/20 10/23 10/6                                   Neuro Re-Ed        SLS Balance                                                         Ther Ex        Nu Step  L3 x 10 min  L5x10 min L5x10 min L5x 10 min  L5 x10 min   UBE   120 rpm  5'/5' 100 rpm  5'/5' 100 rpm  5' fwd /5' retro 100 rpm  5'/5'   Hip adduction  5" x20 5" x20 5''20x 5" x20   Hip abduction   Noel  5" x20   Noel  5" x20 Grey 5''20x Grey  5" x 20   LAQ   1# 5" x20 5" 1.5# x30 Knee ext machine  20# 2x10    Knee flex machine  20# 2x10       1# 5" 3x10   Step up   "B" 1# 2x10 ea "B"  1.5# 2x10 ea "B" 1.5# 2x10 "B"  1# 2x10   Standing three way   1# 2x10 ea 1.5# x10 ea 1.5# x10 each  1# 2x10   HR TR   x20 ea x20 ea 20x each  x20    Mini Squat          SLR flexion   x10 ea 1.5# 2 x 10  ea 1.5# 2 x10 each  2x10 ea   Gastroc stretch   30" x3 Standing incline board  30" x3 30''3x 30" x3   Hamstring stretch   30" x3 30" x3 ea 30''3x  30" x3   Piriformis stretch   30" x3 30" x3 ea 30''3x 30" x3                                           Ther Activity                        Gait Training                        Modalities

## 2023-10-27 NOTE — DISCHARGE INSTRUCTIONS
If you develop any new or worsening symptoms please immediately return to your nearest emergency department  Please follow-up with your outpatient providers as soon as possible  Jabier Au  1961  [id number]      HISTORY OF PRESENT ILLNESS:  Mr. Allen Teixeira is a 58 y.o. male returns for a follow up visit for pain management  He has a diagnosis of   1. Chronic pain syndrome    2. Brachial neuritis or radiculitis    3. Lumbar radiculitis    4. Mood disorder ([address])    5. Failed neck syndrome    6. Fibromyalgia    7. Postlaminectomy syndrome, lumbar region    8. Primary insomnia    9. DDD (degenerative disc disease), lumbar    10. Lumbar spondylosis    11. At risk for respiratory depression due to opioid    . As per information/history obtained from the PADT(patient assessment and documentation tool) -  He complains of pain in the neck, upper back, and lower back with radiation to the shoulders Left, arms Left, hands Left, ankles Left, and feet Left He rates the pain 5/10 and describes it as sharp, pins and needles. Pain is made worse by: nothing, movement, walking, standing, sitting, bending, lifting. He denies any side effects from the current pain regimen. Patient reports that since last follow up visit the physical functioning is unchanged, family/social relationships are unchanged, mood is unchanged sleep patterns are unchanged. Mr. Allen Teixeira states that since starting the treatment with the current regimen the  overall functioning  in the above aspects is  better, Patient denies misusing/abusing his narcotic pain medications or using any illegal drugs. There are No indicators for possible drug abuse, addiction or diversion problems. Upon obtaining the medical history from Mr. Allen Teixeira regarding today's office visit for his presenting problems, patient states he has been doing fair. Mr. Allen Teixeira states he is driving his motor house on vacation for 2 weeks. He mentions he is on Voltaren along with Ambien. Patient states he had an CARMEN on Cervical spine yesterday but did not help much yet. He mentions he is using Xanax still, he has decreased it to 1 per day PRN.

## 2023-11-20 ENCOUNTER — PATIENT OUTREACH (OUTPATIENT)
Dept: HEMATOLOGY ONCOLOGY | Facility: CLINIC | Age: 73
End: 2023-11-20

## 2023-11-20 ENCOUNTER — HOSPITAL ENCOUNTER (OUTPATIENT)
Dept: CT IMAGING | Facility: HOSPITAL | Age: 73
Discharge: HOME | End: 2023-11-20
Payer: MEDICARE

## 2023-11-20 DIAGNOSIS — C34.92 ADENOCARCINOMA, LUNG, LEFT (HCC): ICD-10-CM

## 2023-11-20 PROCEDURE — 71250 CT THORAX DX C-: CPT

## 2023-11-20 PROCEDURE — G1004 CDSM NDSC: HCPCS

## 2023-11-20 NOTE — PROGRESS NOTES
I called Baljit Bravo and reviewed results. Emphysema and bronchiectasis are not concerning. He notes a linger cough and will trial Mucinex. Will plan for 3 month CT to follow 7mm GGO, likely atelectasis. Encouraged to continue with cardio exercise and IS.

## 2023-11-20 NOTE — PROGRESS NOTES
Received a message from patient's wife. She stated the patient  had his scan done today and he has an appointment next Monday. She left the following message. "My concern is the scan came back today and there's some things in there that concern me, especially since he's been coughing. It looks like they said he has mild emphysema and mild bronchitis and also it looks to be like there's some ground glass they claim in the one lung, which they said according to what naturally I googled it said possible like something with a lung collapsing and it could spread. So I'm a little bit concerned about him waiting until Monday if he needs treatment like now go to our primary or to urgent care or something. Could you please give me or my  a call?  My 's number is [phone] or call my cell phone."    Will forward to the Thoracic Surgery team.

## 2023-11-27 ENCOUNTER — OFFICE VISIT (OUTPATIENT)
Dept: CARDIAC SURGERY | Facility: CLINIC | Age: 73
End: 2023-11-27
Payer: MEDICARE

## 2023-11-27 VITALS
HEIGHT: 70 IN | DIASTOLIC BLOOD PRESSURE: 70 MMHG | OXYGEN SATURATION: 99 % | RESPIRATION RATE: 15 BRPM | BODY MASS INDEX: 25.44 KG/M2 | HEART RATE: 63 BPM | WEIGHT: 177.69 LBS | TEMPERATURE: 98.3 F | SYSTOLIC BLOOD PRESSURE: 117 MMHG

## 2023-11-27 DIAGNOSIS — C34.92 ADENOCARCINOMA, LUNG, LEFT (HCC): Primary | ICD-10-CM

## 2023-11-27 PROCEDURE — 99213 OFFICE O/P EST LOW 20 MIN: CPT

## 2023-11-27 NOTE — PROGRESS NOTES
Thoracic Follow-Up  Assessment/Plan:    Adenocarcinoma, lung, left [facility])  Mr. Izaiah Dangelo is approximately 6 months out from left lower lobectomy for stage IIA adenocarcinoma. He is recovering well from surgery. CT scan of the chest on 11/20/2023 notes new 0.7cm groundglass opacity in posterior left upper lobe. No evidence of lung cancer recurrence or metastasis. Patient will return in 3 months with a new chest CT scan for continued lung cancer surveillance given the groundglass opacity and follow up visit after CT scan is completed. Discussed that patient will need surveillance with a chest CT for a minimum of every 6 months for a total of 3 years post operatively and then annually after 3 years. Patient in agreement with plan. Diagnoses and all orders for this visit:    Adenocarcinoma, lung, left ([address])  -     CT chest wo contrast; Future          Thoracic History    Cancer Staging   Adenocarcinoma, lung, left ([address])  Staging form: Lung, AJCC 8th Edition  - Clinical stage from 6/8/2023: Stage IIA (ycT2b, cN0, cM0) - Signed by Quiana Whitfield PA-C on 6/28/2023  Histopathologic type:  Adenocarcinoma, NOS  Stage prefix: Post-therapy        Oncology History   Adenocarcinoma, lung, left ([address])   3/6/2023 Initial Diagnosis    Adenocarcinoma, lung, left ([address])     3/28/2023 -  Chemotherapy    palonosetron (ALOXI), 0.25 mg, Intravenous, Once, 3 of 3 cycles  Administration: 0.25 mg (3/28/2023), 0.25 mg (4/18/2023), 0.25 mg (5/9/2023)  fosaprepitant (EMEND) IVPB, 150 mg, Intravenous, Once, 3 of 3 cycles  Administration: 150 mg (3/28/2023), 150 mg (4/18/2023), 150 mg (5/9/2023)  nivolumab (OPDIVO) IVPB, 360 mg, Intravenous, Once, 2 of 2 cycles  Administration: 360 mg (3/28/2023), 360 mg (4/18/2023)  CARBOplatin (PARAPLATIN) IVPB (GO AUC DOSING), 561.5 mg, Intravenous, Once, 3 of 3 cycles  Administration: 561.5 mg (3/28/2023), 532 mg (4/18/2023), 551 mg (5/9/2023)  PACLItaxel (TAXOL) chemo IVPB, 175 mg/m2 = 336 mg, Intravenous, Once, 3 of 3 cycles  Administration: 336 mg (3/28/2023), 336 mg (4/18/2023), 336 mg (5/9/2023)     6/8/2023 -  Cancer Staged    Staging form: Lung, AJCC 8th Edition  - Clinical stage from 6/8/2023: Stage IIA (ycT2b, cN0, cM0) - Signed by Ermelinda Weathers PA-C on 6/28/2023  Histopathologic type: Adenocarcinoma, NOS  Stage prefix: Post-therapy       6/8/2023 Surgery    Left robotic-assisted lower lobectomy with MLND            Subjective:    Patient ID: Genie Cervantes is a 68 y.o. male. ECOG 0    HPI    Lata Flores is a 68year old male with a history of stage IIA left lower lobe adenocarcinoma s/p neoadjuvant chemotherapy and robotic-assisted left lower lobectomy on 6/8/2023 who presents today for his first surveillance screening visit post operatively. Patient was last seen in our office on 7/31/23; he was recovering well and CT scan was ordered to begin surveillance screening. Recent CT scan of the chest on 11/20/23 reviewed personally by myself in PACS with the scan impression noting new 0.7cm area of groundglass in posterior left upper lobe. No significant mediastinal or hilar lymphadenopathy. On discussion today, patient feels well. Has minimal occasional left rib pain; does not take any pain medication for this. Reports dry cough started about 1 week ago and now improving; he took Mucinex. No sick contacts. States he has less energy than he used to have pre-operatively. Goes to the gym twice a week. Uses IS 3-4 times daily, volumes of 2500mL. Lost balance several times in the past few weeks but does state balance is improving with exercise. No trauma to head when lost balance. Denies shortness of breath, fevers, chills, chest pain, new headaches, extremity weakness/numbness, blurred vision, new onset bone or joint pain, unexplained weight loss. Quit smoking in February 2023.  Since being seen at last visit, patient reports within the last 2 months was evaluated for a stroke and diagnosed with Bell's palsy, thought to be related to his prednisone use that treated his abdominal symptoms after chemotherapy. The following portions of the patient's history were reviewed and updated as appropriate: allergies, current medications, past family history, past medical history, past social history, past surgical history, and problem list.    Review of Systems   Constitutional:  Negative for chills, fever and unexpected weight change. HENT:  Negative for rhinorrhea and sore throat. Eyes:  Negative for visual disturbance. Respiratory:  Positive for cough (dry cough x1 week, improving). Negative for shortness of breath. Cardiovascular:  Negative for chest pain. Gastrointestinal:  Negative for abdominal pain and nausea. Musculoskeletal:  Negative for arthralgias. Neurological:  Negative for weakness, numbness and headaches. Objective:   Physical Exam  Constitutional:       General: He is not in acute distress. HENT:      Head: Normocephalic and atraumatic. Right Ear: External ear normal.      Left Ear: External ear normal.      Nose: Nose normal.   Eyes:      Extraocular Movements: Extraocular movements intact. Comments: Wears glasses     Cardiovascular:      Rate and Rhythm: Normal rate and regular rhythm. Pulmonary:      Effort: Pulmonary effort is normal.      Breath sounds: Normal breath sounds. Abdominal:      Palpations: Abdomen is soft. Tenderness: There is no abdominal tenderness. Musculoskeletal:      Right lower leg: No edema. Left lower leg: No edema. Skin:     General: Skin is warm and dry.      /70 (BP Location: Left arm, Patient Position: Sitting, Cuff Size: Standard)   Pulse 63   Temp 98.3 °F (36.8 °C) (Temporal)   Resp 15   Ht 5' 10" (1.778 m)   Wt 80.6 kg (177 lb 11.1 oz)   SpO2 99%   BMI 25.50 kg/m²     XR chest pa & lateral    Result Date: 7/28/2023  Impression Left lower lobectomy with mild elevation of the left hemidiaphragm with no acute disease. Workstation performed: [id number]     XR chest pa & lateral    Result Date: 6/28/2023  Impression No acute cardiopulmonary disease. Workstation performed: [id number]     XR chest pa & lateral    Result Date: 6/10/2023  Impression Left chest tube removal after left lower lobectomy with no pneumothorax. Workstation performed: [id number]      CT chest wo contrast    Result Date: 11/20/2023  Narrative CT CHEST WITHOUT IV CONTRAST INDICATION:   C34.92: Malignant neoplasm of unspecified part of left bronchus or lung. Treatment has included neoadjuvant chemotherapy/immunotherapy and left lower lobectomy (June 8, 2023) COMPARISON: Chest CT January 30, 2023. Correlation PET/CT May 23, 2023 TECHNIQUE: CT examination of the chest was performed without intravenous contrast. Multiplanar 2D reformatted images were created from the source data. This examination, like all CT scans performed in the [facility], was performed utilizing techniques to minimize radiation dose exposure, including the use of iterative reconstruction and automated exposure control. Radiation dose length product (DLP) for this visit:  400.33 mGy-cm FINDINGS: LUNGS: Post left lower lobectomy. Mild emphysema. Mild bronchiectasis. 0.7 cm area of groundglass in the posterior left upper lobe is new since prior study and is likely focal atelectasis (series 3, image 176). PLEURA:  Unremarkable. HEART/GREAT VESSELS: Heart is unremarkable for patient's age. No thoracic aortic aneurysm. MEDIASTINUM AND OBIE:  Unremarkable. CHEST WALL AND LOWER NECK:  Unremarkable. VISUALIZED STRUCTURES IN THE UPPER ABDOMEN: 3 cm left renal cyst. OSSEOUS STRUCTURES: Spinal degenerative changes are noted. No acute fracture or destructive osseous lesion. Impression No recurrent or metastatic disease in the chest following left lower lobectomy.  Workstation performed: [id number]     No CT Chest,Abdomen,Pelvis results available for this patient. NM PET CT skull base to mid thigh    Result Date: 5/23/2023  Narrative PET/CT SCAN INDICATION: C34.92: Malignant neoplasm of unspecified part of left bronchus or lung C34.32: Malignant neoplasm of lower lobe, left bronchus or lung Restaging study following neoadjuvant therapy, lung cancer. MODIFIER: PS COMPARISON: 3/16/2023, CT abdomen and pelvis 4/21/2023 CELL TYPE: Left lower lobe adenocarcinoma TECHNIQUE:   8.3 mCi F-18-FDG administered IV. Multiplanar attenuation corrected and non attenuation corrected PET images are available for interpretation, and contiguous, low dose, axial CT sections were obtained from the skull base through the femurs. Intravenous contrast material was not utilized. This examination, like all CT scans performed in the [facility], was performed utilizing techniques to minimize radiation dose exposure, including the use of iterative reconstruction and automated exposure control. Fasting serum glucose: 97 mg/dl FINDINGS: VISUALIZED BRAIN: No acute abnormalities are seen. HEAD/NECK: -No suspicious FDG avid cervical adenopathy - Prior study demonstrated increased activity of the left mandible, SUV max was 5.9 now 3.2. This may be associated with diminished inflammation associated with dental/periodontal disease - Mild bilateral symmetric and diffuse thyroid activity can be a normal physiologic finding or related to thyroid dysfunction; correlate with thyroid function tests as clinically indicated CT images: Otherwise unremarkable CHEST: -Again seen is the semisolid left lower lobe mass earlier up to 4.6 cm in size currently 3.4 cm. Prior SUV max was 2.2, currently 2.1. - No new parenchymal abnormalities - No development of FDG avid mediastinal or hilar adenopathy CT images: Coronary artery calcification. No pericardial or pleural effusion ABDOMEN: No FDG avid soft tissue lesions are seen. CT images: Presumed bilateral renal cysts.  Moderately increased stool within the colon. Small periumbilical adipose containing hernia. PELVIS: No FDG avid soft tissue lesions are seen. CT images: Left gluteal intramuscular lipoma. OSSEOUS STRUCTURES: No FDG avid lesions are seen. CT images: No significant findings. Impression 1. The semisolid left lower lobe mass has diminished in size. FDG avidity has not significantly changed. 2. No new FDG-avid abnormalities within the thorax. No evidence of FDG avid metastatic disease in the neck, abdomen, pelvis or skeleton 3. Partially diminished FDG avidity of the left mandible Workstation performed: [id number]     NM PET CT skull base to mid thigh    Result Date: 3/16/2023  Narrative PET/CT SCAN INDICATION: New diagnosis of lung cancer. Initial staging. C34.92: Malignant neoplasm of unspecified part of left bronchus or lung C34.02: Malignant neoplasm of left main bronchus MODIFIER: PI COMPARISON: CT chest 1/30/2023 CELL TYPE:  Adenocarcinoma of the lung TECHNIQUE:   8.5 mCi F-18-FDG administered IV. Multiplanar attenuation corrected and non attenuation corrected PET images are available for interpretation, and contiguous, low dose, axial CT sections were obtained from the skull vertex through the femurs. Intravenous contrast material was not utilized. This examination, like all CT scans performed in the [facility], was performed utilizing techniques to minimize radiation dose exposure, including the use of iterative reconstruction and automated exposure control. Fasting serum glucose: 84 mg/dl FINDINGS: VISUALIZED BRAIN:   No acute abnormalities are seen. HEAD/NECK:   Focal radiotracer uptake at the left mandible, SUV max of 5.9. There is a focal lucency adjacent to a more on CT measuring up to 9 mm in size image 67 series 3. No FDG avid lymph nodes. CT images: No additional findings. CHEST:   Mild radiotracer uptake related to the left lower lobe mass, SUV max of 2.2.  This is a semi solid density on CT measures up to 4.6 cm in size, stable. No suspicious focal FDG uptake in the mediastinal or perihilar regions. CT images: Mild biapical nodularity better seen on prior chest CT. No suspicious FDG uptake here. ABDOMEN:   No FDG avid soft tissue lesions are seen. CT images: A few renal hypodensities likely cysts. Moderate fecal retention in the colon. Small fat-containing focal hernia. Colonic diverticulosis. PELVIS: No FDG avid soft tissue lesions are seen. CT images: 5.2 cm lipoma of the left gluteal musculature anteriorly. OSSEOUS STRUCTURES: No FDG avid lesions are seen. Focal uptake inferior to the right glenoid likely inflammatory such as related to arthritis given the location. No obvious findings on CT. CT images: No significant findings. Impression 1. The left lower lobe mass demonstrates mild FDG uptake compatible with the known malignancy. The low level of uptake would favor a low-grade malignancy. 2. No findings for hypermetabolic metastasis. 3.  Focal radiotracer uptake at the left mandible adjacent to a mandibular molar with corresponding lucent lesion on CT. Findings suggest inflammatory periodontal disease. Correlate with dental exam. Workstation performed: [id number]      No Barium Swallow results available for this patient.

## 2023-11-27 NOTE — PROGRESS NOTES
Assessment/Plan:    No problem-specific Assessment & Plan notes found for this encounter. Diagnoses and all orders for this visit:    Other fatigue  Comments:  Normal p.o. intake bowel movement status post chemotherapy June 2023  Check vitamins and  thyroid    Low energy  -     Folate; Future  -     Comprehensive metabolic panel; Future  -     Magnesium; Future    Screening for thyroid disorder  -     TSH, 3rd generation with Free T4 reflex; Future  -     T4, free; Future  -     T3, free; Future    Deficiency of other specified B group vitamins  -     Vitamin B12; Future  -     Folate; Future    Other orders  -     b complex vitamins capsule; Take 1 capsule by mouth daily  -     Multiple Vitamin (multivitamin) tablet; Take 1 tablet by mouth daily        Return to review labs I will give him prescription for B12 shot upon reviewing the labs  Subjective:      Patient ID: Jovan Farris is a 68 y.o. male. PRINCE Hankins is a 68year old male with a history of stage IIA left lower lobe adenocarcinoma s/p neoadjuvant chemotherapy and robotic-assisted left lower lobectomy on 6/8/2023 who presents today regarding fatigue. He reports since May he has been feeling that he has low energy. He received in May his last chemotherapy. This can be multifactorial secondary to chemotherapy that he received. He reports that he has normal appetite no diarrhea, no fever. Patient quit smoking February 2023. He reports that before that he used to smoke 1 pack/day for almost 65 years. Alcohol use no recreational drug use. The following portions of the patient's history were reviewed and updated as appropriate: allergies, current medications, past family history, past medical history, past social history, past surgical history, and problem list.    Review of Systems   Constitutional:  Positive for fatigue. Negative for chills and fever. HENT:  Negative for ear pain and sore throat.     Eyes:  Negative for pain and visual disturbance. Respiratory:  Negative for cough and shortness of breath. Cardiovascular:  Negative for chest pain and palpitations. Gastrointestinal:  Negative for abdominal pain and vomiting. Genitourinary:  Negative for dysuria and hematuria. Musculoskeletal:  Negative for arthralgias and back pain. Skin:  Negative for color change and rash. Neurological:  Negative for seizures and syncope. All other systems reviewed and are negative. Objective: There were no vitals taken for this visit. Physical Exam  Vitals reviewed. HENT:      Head: Normocephalic and atraumatic. Nose: Nose normal.      Mouth/Throat:      Mouth: Mucous membranes are moist.   Eyes:      Conjunctiva/sclera: Conjunctivae normal.      Pupils: Pupils are equal, round, and reactive to light. Cardiovascular:      Rate and Rhythm: Normal rate and regular rhythm. Pulses: Normal pulses. Heart sounds: Normal heart sounds. Pulmonary:      Effort: Pulmonary effort is normal.      Breath sounds: Normal breath sounds. Musculoskeletal:      Right lower leg: No edema. Left lower leg: No edema. Skin:     General: Skin is warm. Capillary Refill: Capillary refill takes less than 2 seconds. Neurological:      General: No focal deficit present. Mental Status: He is alert and oriented to person, place, and time.    Psychiatric:         Mood and Affect: Mood normal.

## 2023-11-27 NOTE — ASSESSMENT & PLAN NOTE
Mr. Calvin Burton is approximately 6 months out from left lower lobectomy for stage IIA adenocarcinoma. He is recovering well from surgery. CT scan of the chest on 11/20/2023 notes new 0.7cm groundglass opacity in posterior left upper lobe. No evidence of lung cancer recurrence or metastasis. Patient will return in 3 months with a new chest CT scan for continued lung cancer surveillance given the groundglass opacity and follow up visit after CT scan is completed. Discussed that patient will need surveillance with a chest CT for a minimum of every 6 months for a total of 3 years post operatively and then annually after 3 years. Patient in agreement with plan.

## 2023-11-28 ENCOUNTER — OFFICE VISIT (OUTPATIENT)
Dept: FAMILY MEDICINE CLINIC | Facility: CLINIC | Age: 73
End: 2023-11-28
Payer: MEDICARE

## 2023-11-28 VITALS
HEIGHT: 70 IN | SYSTOLIC BLOOD PRESSURE: 118 MMHG | HEART RATE: 53 BPM | BODY MASS INDEX: 25.48 KG/M2 | WEIGHT: 178 LBS | OXYGEN SATURATION: 99 % | TEMPERATURE: 96.8 F | DIASTOLIC BLOOD PRESSURE: 72 MMHG

## 2023-11-28 DIAGNOSIS — E53.8 DEFICIENCY OF OTHER SPECIFIED B GROUP VITAMINS: ICD-10-CM

## 2023-11-28 DIAGNOSIS — R53.83 OTHER FATIGUE: Primary | ICD-10-CM

## 2023-11-28 DIAGNOSIS — Z13.29 SCREENING FOR THYROID DISORDER: ICD-10-CM

## 2023-11-28 DIAGNOSIS — R53.83 LOW ENERGY: ICD-10-CM

## 2023-11-28 PROCEDURE — 99213 OFFICE O/P EST LOW 20 MIN: CPT | Performed by: FAMILY MEDICINE

## 2023-11-28 RX ORDER — VITAMIN B COMPLEX
1 CAPSULE ORAL DAILY
COMMUNITY

## 2023-11-28 RX ORDER — MULTIVITAMIN
1 TABLET ORAL DAILY
COMMUNITY

## 2023-11-29 ENCOUNTER — TELEPHONE (OUTPATIENT)
Dept: FAMILY MEDICINE CLINIC | Facility: CLINIC | Age: 73
End: 2023-11-29

## 2023-11-29 ENCOUNTER — APPOINTMENT (OUTPATIENT)
Dept: LAB | Facility: HOSPITAL | Age: 73
End: 2023-11-29
Payer: MEDICARE

## 2023-11-29 DIAGNOSIS — E03.9 ACQUIRED HYPOTHYROIDISM: Primary | ICD-10-CM

## 2023-11-29 DIAGNOSIS — E53.8 DEFICIENCY OF OTHER SPECIFIED B GROUP VITAMINS: ICD-10-CM

## 2023-11-29 DIAGNOSIS — R53.83 LOW ENERGY: ICD-10-CM

## 2023-11-29 DIAGNOSIS — Z13.29 SCREENING FOR THYROID DISORDER: ICD-10-CM

## 2023-11-29 LAB
ALBUMIN SERPL BCP-MCNC: 4.6 G/DL (ref 3.5–5)
ALP SERPL-CCNC: 35 U/L (ref 34–104)
ALT SERPL W P-5'-P-CCNC: 31 U/L (ref 7–52)
ANION GAP SERPL CALCULATED.3IONS-SCNC: 7 MMOL/L
AST SERPL W P-5'-P-CCNC: 51 U/L (ref 13–39)
BILIRUB SERPL-MCNC: 0.89 MG/DL (ref 0.2–1)
BUN SERPL-MCNC: 23 MG/DL (ref 5–25)
CALCIUM SERPL-MCNC: 9.5 MG/DL (ref 8.4–10.2)
CHLORIDE SERPL-SCNC: 103 MMOL/L (ref 96–108)
CO2 SERPL-SCNC: 28 MMOL/L (ref 21–32)
CREAT SERPL-MCNC: 1.24 MG/DL (ref 0.6–1.3)
FOLATE SERPL-MCNC: 7.3 NG/ML
GFR SERPL CREATININE-BSD FRML MDRD: 57 ML/MIN/1.73SQ M
GLUCOSE SERPL-MCNC: 80 MG/DL (ref 65–140)
MAGNESIUM SERPL-MCNC: 2.3 MG/DL (ref 1.9–2.7)
POTASSIUM SERPL-SCNC: 4.2 MMOL/L (ref 3.5–5.3)
PROT SERPL-MCNC: 7.5 G/DL (ref 6.4–8.4)
SODIUM SERPL-SCNC: 138 MMOL/L (ref 135–147)
T3FREE SERPL-MCNC: 1.7 PG/ML (ref 2.5–3.9)
T4 FREE SERPL-MCNC: <0.25 NG/DL (ref 0.61–1.12)
T4 FREE SERPL-MCNC: <0.25 NG/DL (ref 0.61–1.12)
TSH SERPL DL<=0.05 MIU/L-ACNC: 20.98 UIU/ML (ref 0.45–4.5)
VIT B12 SERPL-MCNC: 310 PG/ML (ref 180–914)

## 2023-11-29 PROCEDURE — 84481 FREE ASSAY (FT-3): CPT

## 2023-11-29 PROCEDURE — 84439 ASSAY OF FREE THYROXINE: CPT

## 2023-11-29 PROCEDURE — 80053 COMPREHEN METABOLIC PANEL: CPT

## 2023-11-29 PROCEDURE — 83735 ASSAY OF MAGNESIUM: CPT

## 2023-11-29 PROCEDURE — 36415 COLL VENOUS BLD VENIPUNCTURE: CPT

## 2023-11-29 PROCEDURE — 84443 ASSAY THYROID STIM HORMONE: CPT

## 2023-11-29 PROCEDURE — 82746 ASSAY OF FOLIC ACID SERUM: CPT

## 2023-11-29 PROCEDURE — 82607 VITAMIN B-12: CPT

## 2023-11-29 RX ORDER — LEVOTHYROXINE SODIUM 0.05 MG/1
50 TABLET ORAL
Qty: 90 TABLET | Refills: 3 | Status: SHIPPED | OUTPATIENT
Start: 2023-11-29

## 2023-11-29 NOTE — TELEPHONE ENCOUNTER
Called and spoke with patient regarding his labs. His TSH is elevated T4 and T3 still pending. Discussed with patient that he has most probably hypothyroidism secondary to chemotherapy. I will wait for the rest of the blood work and initiate levothyroxine after that.

## 2024-01-22 ENCOUNTER — APPOINTMENT (OUTPATIENT)
Dept: LAB | Facility: HOSPITAL | Age: 74
End: 2024-01-22
Payer: MEDICARE

## 2024-01-22 DIAGNOSIS — E03.9 ACQUIRED HYPOTHYROIDISM: ICD-10-CM

## 2024-01-22 LAB
T4 FREE SERPL-MCNC: 0.58 NG/DL (ref 0.61–1.12)
TSH SERPL DL<=0.05 MIU/L-ACNC: 8 UIU/ML (ref 0.45–4.5)

## 2024-01-22 PROCEDURE — 84443 ASSAY THYROID STIM HORMONE: CPT

## 2024-01-22 PROCEDURE — 36415 COLL VENOUS BLD VENIPUNCTURE: CPT

## 2024-01-22 PROCEDURE — 84439 ASSAY OF FREE THYROXINE: CPT

## 2024-01-29 NOTE — PROGRESS NOTES
Assessment/Plan:    No problem-specific Assessment & Plan notes found for this encounter.       Diagnoses and all orders for this visit:    Acquired hypothyroidism  Comments:  TSH/free T4 significantly improved  Will increase levothyroxine from 50 to 75 mcg  Repeat TFTs in 6  weeks  Orders:  -     TSH, 3rd generation; Future  -     T4, free; Future  -     levothyroxine (Euthyrox) 75 mcg tablet; Take 1 tablet (75 mcg total) by mouth daily    Need for hepatitis C screening test  -     Hepatitis C Antibody; Future    Encounter for immunization  Comments:  Refused pneumococcal and influenza vaccinations.  Orders:  -     Cancel: Pneumococcal Conjugate Vaccine 20-valent (PCV20)  -     Cancel: influenza vaccine, high-dose, PF 0.7 mL (FLUZONE HIGH-DOSE)          Subjective:      Patient ID: Ankur Marc is a 73 y.o. male.    HPI  Patient is here to follow-up regarding hypothyroidism.  He reports that his fatigue is significantly improved.  I reviewed with him his TFTs.  I increased his levothyroxine from 50 to 75 mcg.  He will repeat TFTs in 6 weeks.  The following portions of the patient's history were reviewed and updated as appropriate: allergies, current medications, past family history, past medical history, past social history, past surgical history, and problem list.    Review of Systems   Constitutional:  Negative for chills and fever.   HENT:  Negative for ear pain and sore throat.    Eyes:  Negative for pain and visual disturbance.   Respiratory:  Negative for cough and shortness of breath.    Cardiovascular:  Negative for chest pain and palpitations.   Gastrointestinal:  Negative for abdominal pain and vomiting.   Genitourinary:  Negative for dysuria and hematuria.   Musculoskeletal:  Negative for arthralgias and back pain.   Skin:  Negative for color change and rash.   Neurological:  Negative for seizures and syncope.   All other systems reviewed and are negative.        Objective:      /62   Pulse (!)  "52   Temp (!) 96.8 °F (36 °C)   Ht 5' 10\" (1.778 m)   Wt 80.3 kg (177 lb)   SpO2 98%   BMI 25.40 kg/m²          Physical Exam  Vitals reviewed.   HENT:      Head: Normocephalic and atraumatic.   Eyes:      Conjunctiva/sclera: Conjunctivae normal.      Pupils: Pupils are equal, round, and reactive to light.   Cardiovascular:      Rate and Rhythm: Normal rate and regular rhythm.      Pulses: Normal pulses.      Heart sounds: Normal heart sounds.   Pulmonary:      Effort: Pulmonary effort is normal.      Breath sounds: Normal breath sounds.   Skin:     General: Skin is warm.      Capillary Refill: Capillary refill takes less than 2 seconds.   Neurological:      General: No focal deficit present.      Mental Status: He is alert and oriented to person, place, and time.   Psychiatric:         Mood and Affect: Mood normal.         Thought Content: Thought content normal.         Judgment: Judgment normal.           "

## 2024-01-30 ENCOUNTER — OFFICE VISIT (OUTPATIENT)
Dept: FAMILY MEDICINE CLINIC | Facility: CLINIC | Age: 74
End: 2024-01-30
Payer: MEDICARE

## 2024-01-30 VITALS
HEIGHT: 70 IN | BODY MASS INDEX: 25.34 KG/M2 | DIASTOLIC BLOOD PRESSURE: 62 MMHG | OXYGEN SATURATION: 98 % | HEART RATE: 52 BPM | SYSTOLIC BLOOD PRESSURE: 118 MMHG | TEMPERATURE: 96.8 F | WEIGHT: 177 LBS

## 2024-01-30 DIAGNOSIS — E03.9 ACQUIRED HYPOTHYROIDISM: Primary | ICD-10-CM

## 2024-01-30 DIAGNOSIS — Z23 ENCOUNTER FOR IMMUNIZATION: ICD-10-CM

## 2024-01-30 DIAGNOSIS — Z11.59 NEED FOR HEPATITIS C SCREENING TEST: ICD-10-CM

## 2024-01-30 PROCEDURE — 99213 OFFICE O/P EST LOW 20 MIN: CPT | Performed by: FAMILY MEDICINE

## 2024-01-30 RX ORDER — LEVOTHYROXINE SODIUM 0.07 MG/1
75 TABLET ORAL DAILY
Qty: 30 TABLET | Refills: 2 | Status: SHIPPED | OUTPATIENT
Start: 2024-01-30 | End: 2024-04-29

## 2024-02-20 ENCOUNTER — TELEPHONE (OUTPATIENT)
Dept: HEMATOLOGY ONCOLOGY | Facility: CLINIC | Age: 74
End: 2024-02-20

## 2024-02-20 NOTE — TELEPHONE ENCOUNTER
Appointment Change  Cancel, Reschedule, Change to Virtual      Who are you speaking with? Patient   If it is not the patient, is the caller listed on the communication consent form? Yes   Which provider is the appointment scheduled with? Dr. Willard and Angela Mills PA-C   When was the original appointment scheduled?    Please list date and time 3/4/24   At which location is the appointment scheduled to take place? [place]   Was the appointment rescheduled?     Was the appointment changed from an in person visit to a virtual visit?    If so, please list the details of the change. 3/20/24   What is the reason for the appointment change? Provider

## 2024-02-26 ENCOUNTER — HOSPITAL ENCOUNTER (OUTPATIENT)
Dept: CT IMAGING | Facility: HOSPITAL | Age: 74
Discharge: HOME | End: 2024-02-26
Payer: MEDICARE

## 2024-02-26 DIAGNOSIS — C34.92 ADENOCARCINOMA, LUNG, LEFT (HCC): ICD-10-CM

## 2024-02-26 PROCEDURE — G1004 CDSM NDSC: HCPCS

## 2024-02-26 PROCEDURE — 71250 CT THORAX DX C-: CPT

## 2024-03-08 DIAGNOSIS — E03.9 ACQUIRED HYPOTHYROIDISM: ICD-10-CM

## 2024-03-11 ENCOUNTER — RA CDI HCC (OUTPATIENT)
Dept: OTHER | Facility: HOSPITAL | Age: 74
End: 2024-03-11

## 2024-03-11 RX ORDER — LEVOTHYROXINE SODIUM 0.07 MG/1
75 TABLET ORAL DAILY
Qty: 90 TABLET | Refills: 2 | Status: SHIPPED | OUTPATIENT
Start: 2024-03-11

## 2024-03-12 ENCOUNTER — LAB (OUTPATIENT)
Dept: LAB | Facility: HOSPITAL | Age: 74
End: 2024-03-12
Payer: MEDICARE

## 2024-03-12 DIAGNOSIS — Z11.59 NEED FOR HEPATITIS C SCREENING TEST: ICD-10-CM

## 2024-03-12 DIAGNOSIS — E03.9 ACQUIRED HYPOTHYROIDISM: ICD-10-CM

## 2024-03-12 LAB
HCV AB SER QL: NORMAL
T4 FREE SERPL-MCNC: 0.52 NG/DL (ref 0.61–1.12)
TSH SERPL DL<=0.05 MIU/L-ACNC: 8.73 UIU/ML (ref 0.45–4.5)

## 2024-03-12 PROCEDURE — 86803 HEPATITIS C AB TEST: CPT

## 2024-03-12 PROCEDURE — 84443 ASSAY THYROID STIM HORMONE: CPT

## 2024-03-12 PROCEDURE — 84439 ASSAY OF FREE THYROXINE: CPT

## 2024-03-12 PROCEDURE — 36415 COLL VENOUS BLD VENIPUNCTURE: CPT

## 2024-03-18 ENCOUNTER — OFFICE VISIT (OUTPATIENT)
Dept: FAMILY MEDICINE CLINIC | Facility: CLINIC | Age: 74
End: 2024-03-18
Payer: MEDICARE

## 2024-03-18 VITALS
HEART RATE: 61 BPM | TEMPERATURE: 97.6 F | BODY MASS INDEX: 24.91 KG/M2 | HEIGHT: 70 IN | OXYGEN SATURATION: 96 % | WEIGHT: 174 LBS | DIASTOLIC BLOOD PRESSURE: 78 MMHG | SYSTOLIC BLOOD PRESSURE: 116 MMHG

## 2024-03-18 DIAGNOSIS — E78.00 HYPERCHOLESTEROLEMIA: ICD-10-CM

## 2024-03-18 DIAGNOSIS — E03.9 ACQUIRED HYPOTHYROIDISM: ICD-10-CM

## 2024-03-18 DIAGNOSIS — Z13.31 NEGATIVE DEPRESSION SCREENING: ICD-10-CM

## 2024-03-18 DIAGNOSIS — D69.6 PLATELETS DECREASED (HCC): ICD-10-CM

## 2024-03-18 DIAGNOSIS — Z12.5 SCREENING FOR PROSTATE CANCER: ICD-10-CM

## 2024-03-18 DIAGNOSIS — Z00.00 MEDICARE ANNUAL WELLNESS VISIT, SUBSEQUENT: Primary | ICD-10-CM

## 2024-03-18 DIAGNOSIS — C34.92 ADENOCARCINOMA, LUNG, LEFT (HCC): ICD-10-CM

## 2024-03-18 DIAGNOSIS — Z23 ENCOUNTER FOR IMMUNIZATION: ICD-10-CM

## 2024-03-18 PROCEDURE — G0439 PPPS, SUBSEQ VISIT: HCPCS | Performed by: FAMILY MEDICINE

## 2024-03-18 PROCEDURE — 99214 OFFICE O/P EST MOD 30 MIN: CPT | Performed by: FAMILY MEDICINE

## 2024-03-18 NOTE — PATIENT INSTRUCTIONS
Medicare Preventive Visit Patient Instructions  Thank you for completing your Welcome to Medicare Visit or Medicare Annual Wellness Visit today. Your next wellness visit will be due in one year (3/19/2025).  The screening/preventive services that you may require over the next 5-10 years are detailed below. Some tests may not apply to you based off risk factors and/or age. Screening tests ordered at today's visit but not completed yet may show as past due. Also, please note that scanned in results may not display below.  Preventive Screenings:  Service Recommendations Previous Testing/Comments   Colorectal Cancer Screening  Colonoscopy    Fecal Occult Blood Test (FOBT)/Fecal Immunochemical Test (FIT)  Fecal DNA/Cologuard Test  Flexible Sigmoidoscopy Age: 45-75 years old   Colonoscopy: every 10 years (May be performed more frequently if at higher risk)  OR  FOBT/FIT: every 1 year  OR  Cologuard: every 3 years  OR  Sigmoidoscopy: every 5 years  Screening may be recommended earlier than age 45 if at higher risk for colorectal cancer. Also, an individualized decision between you and your healthcare provider will decide whether screening between the ages of 76-85 would be appropriate. Colonoscopy: 05/05/2023  FOBT/FIT: Not on file  Cologuard: Not on file  Sigmoidoscopy: Not on file    Screening Current     Prostate Cancer Screening Individualized decision between patient and health care provider in men between ages of 55-69   Medicare will cover every 12 months beginning on the day after your 50th birthday PSA: 3.3 ng/mL           Hepatitis C Screening Once for adults born between 1945 and 1965  More frequently in patients at high risk for Hepatitis C Hep C Antibody: 03/12/2024    Screening Current   Diabetes Screening 1-2 times per year if you're at risk for diabetes or have pre-diabetes Fasting glucose: 104 mg/dL (5/30/2023)  A1C: 5.6 % (1/30/2023)  Screening Current   Cholesterol Screening Once every 5 years if you  don't have a lipid disorder. May order more often based on risk factors. Lipid panel: 01/30/2023  Screening Not Indicated  History Lipid Disorder      Other Preventive Screenings Covered by Medicare:  Abdominal Aortic Aneurysm (AAA) Screening: covered once if your at risk. You're considered to be at risk if you have a family history of AAA or a male between the age of 65-75 who smoking at least 100 cigarettes in your lifetime.  Lung Cancer Screening: covers low dose CT scan once per year if you meet all of the following conditions: (1) Age 55-77; (2) No signs or symptoms of lung cancer; (3) Current smoker or have quit smoking within the last 15 years; (4) You have a tobacco smoking history of at least 20 pack years (packs per day x number of years you smoked); (5) You get a written order from a healthcare provider.  Glaucoma Screening: covered annually if you're considered high risk: (1) You have diabetes OR (2) Family history of glaucoma OR (3)  aged 50 and older OR (4)  American aged 65 and older  Osteoporosis Screening: covered every 2 years if you meet one of the following conditions: (1) Have a vertebral abnormality; (2) On glucocorticoid therapy for more than 3 months; (3) Have primary hyperparathyroidism; (4) On osteoporosis medications and need to assess response to drug therapy.  HIV Screening: covered annually if you're between the age of 15-65. Also covered annually if you are younger than 15 and older than 65 with risk factors for HIV infection. For pregnant patients, it is covered up to 3 times per pregnancy.    Immunizations:  Immunization Recommendations   Influenza Vaccine Annual influenza vaccination during flu season is recommended for all persons aged >= 6 months who do not have contraindications   Pneumococcal Vaccine   * Pneumococcal conjugate vaccine = PCV13 (Prevnar 13), PCV15 (Vaxneuvance), PCV20 (Prevnar 20)  * Pneumococcal polysaccharide vaccine = PPSV23 (Pneumovax)  Adults 19-65 yo with certain risk factors or if 65+ yo  If never received any pneumonia vaccine: recommend Prevnar 20 (PCV20)  Give PCV20 if previously received 1 dose of PCV13 or PPSV23   Hepatitis B Vaccine 3 dose series if at intermediate or high risk (ex: diabetes, end stage renal disease, liver disease)   Respiratory syncytial virus (RSV) Vaccine - COVERED BY MEDICARE PART D  * RSVPreF3 (Arexvy) CDC recommends that adults 60 years of age and older may receive a single dose of RSV vaccine using shared clinical decision-making (SCDM)   Tetanus (Td) Vaccine - COST NOT COVERED BY MEDICARE PART B Following completion of primary series, a booster dose should be given every 10 years to maintain immunity against tetanus. Td may also be given as tetanus wound prophylaxis.   Tdap Vaccine - COST NOT COVERED BY MEDICARE PART B Recommended at least once for all adults. For pregnant patients, recommended with each pregnancy.   Shingles Vaccine (Shingrix) - COST NOT COVERED BY MEDICARE PART B  2 shot series recommended in those 19 years and older who have or will have weakened immune systems or those 50 years and older     Health Maintenance Due:      Topic Date Due   • Colorectal Cancer Screening  05/05/2033   • Hepatitis C Screening  Completed   • Lung Cancer Screening  Discontinued     Immunizations Due:      Topic Date Due   • Pneumococcal Vaccine: 65+ Years (2 of 2 - PPSV23 or PCV20) 10/25/2019   • Influenza Vaccine (1) 09/01/2023   • COVID-19 Vaccine (4 - 2023-24 season) 09/01/2023     Advance Directives   What are advance directives?  Advance directives are legal documents that state your wishes and plans for medical care. These plans are made ahead of time in case you lose your ability to make decisions for yourself. Advance directives can apply to any medical decision, such as the treatments you want, and if you want to donate organs.   What are the types of advance directives?  There are many types of advance  directives, and each state has rules about how to use them. You may choose a combination of any of the following:  Living will:  This is a written record of the treatment you want. You can also choose which treatments you do not want, which to limit, and which to stop at a certain time. This includes surgery, medicine, IV fluid, and tube feedings.   Durable power of  for healthcare (DPAHC):  This is a written record that states who you want to make healthcare choices for you when you are unable to make them for yourself. This person, called a proxy, is usually a family member or a friend. You may choose more than 1 proxy.  Do not resuscitate (DNR) order:  A DNR order is used in case your heart stops beating or you stop breathing. It is a request not to have certain forms of treatment, such as CPR. A DNR order may be included in other types of advance directives.  Medical directive:  This covers the care that you want if you are in a coma, near death, or unable to make decisions for yourself. You can list the treatments you want for each condition. Treatment may include pain medicine, surgery, blood transfusions, dialysis, IV or tube feedings, and a ventilator (breathing machine).  Values history:  This document has questions about your views, beliefs, and how you feel and think about life. This information can help others choose the care that you would choose.  Why are advance directives important?  An advance directive helps you control your care. Although spoken wishes may be used, it is better to have your wishes written down. Spoken wishes can be misunderstood, or not followed. Treatments may be given even if you do not want them. An advance directive may make it easier for your family to make difficult choices about your care.       © Copyright [employer] 2018 Information is for End User's use only and may not be sold, redistributed or otherwise used for commercial purposes. All illustrations and  images included in CareNotes® are the copyrighted property of [email or web address] or [email or web address]

## 2024-03-18 NOTE — PROGRESS NOTES
Assessment and Plan:     Problem List Items Addressed This Visit       Negative depression screening    Hypercholesterolemia    Relevant Orders    Comprehensive metabolic panel    Lipid panel    Adenocarcinoma, lung, left (HCC)    Platelets decreased (HCC)    Relevant Orders    CBC and differential    Medicare annual wellness visit, subsequent - Primary     Other Visit Diagnoses       Acquired hypothyroidism        pt is completely asymptomatic    Relevant Orders    TSH, 3rd generation with Free T4 reflex    Encounter for immunization        Relevant Orders    Pneumococcal Conjugate Vaccine 20-valent (Pcv20)    Screening for prostate cancer        Relevant Orders    PSA, Total Screen             Preventive health issues were discussed with patient, and age appropriate screening tests were ordered as noted in patient's After Visit Summary.  Personalized health advice and appropriate referrals for health education or preventive services given if needed, as noted in patient's After Visit Summary.     History of Present Illness:     Patient presents for a Medicare Wellness Visit    Hyperlipidemia  This is a chronic problem. The current episode started more than 1 year ago. Factors aggravating his hyperlipidemia include fatty foods. Pertinent negatives include no chest pain, myalgias or shortness of breath. Compliance problems include adherence to diet and adherence to exercise.       Patient Care Team:  Shaun Oswald DO as PCP - General (Family Medicine)  MAXWELL Nichole MD as Medical Oncologist (Hematology and Oncology)  Sheila Catalan RN as Registered Nurse (Hematology and Oncology)  Celestino Willard MD as Surgeon (Thoracic Surgery)  Shaun Hartley MA as Care Coordinator (Oncology)  Dahlia Barbosa RN as Nurse Navigator (Oncology)  Jamey Cohn as  (Oncology)     Review of Systems:     Review of Systems   Constitutional: Negative.  Negative for chills, fatigue  and fever.   HENT: Negative.  Negative for hearing loss.    Eyes: Negative.  Negative for visual disturbance.   Respiratory:  Negative for shortness of breath and wheezing.    Cardiovascular:  Negative for chest pain and palpitations.   Gastrointestinal:  Negative for abdominal pain, blood in stool, constipation, diarrhea, nausea and vomiting.   Endocrine: Negative.  Negative for cold intolerance and heat intolerance.   Genitourinary:  Negative for difficulty urinating and dysuria.   Musculoskeletal:  Negative for arthralgias and myalgias.   Skin: Negative.    Allergic/Immunologic: Negative.    Neurological:  Negative for seizures and syncope.   Hematological:  Negative for adenopathy.   Psychiatric/Behavioral: Negative.          Problem List:     Patient Active Problem List   Diagnosis    Negative depression screening    Hypercholesterolemia    Adenocarcinoma, lung, left (HCC)    Platelets decreased (HCC)    BPH with obstruction/lower urinary tract symptoms    Low energy    Medicare annual wellness visit, subsequent      Past Medical and Surgical History:     Past Medical History:   Diagnosis Date    BPH with obstruction/lower urinary tract symptoms     Cancer (HCC) 2/27/2023    biopsy results    Cystitis, chronic     Epididymo-orchitis     Feeling of incomplete bladder emptying     Frequency of micturition     Nocturia     Other microscopic hematuria     Poor urinary stream     Simple renal cyst      Past Surgical History:   Procedure Laterality Date    CYSTOSCOPY  2012    FRACTURE SURGERY      legs, arms, shoulder, back after accident    IR BIOPSY LUNG  02/23/2023    CT BRNCHSC INCL FLUOR GDNCE DX W/CELL WASHG SPX N/A 6/8/2023    Procedure: BRONCHOSCOPY FLEXIBLE;  Surgeon: Celestino Willard MD;  Location: [facility];  Service: Thoracic    CT THORACOSCOPY W/LOBECTOMY SINGLE LOBE Left 6/8/2023    Procedure: Left robotic assisted lower lobectomy w/ mediastinal lymph node dissection;  Surgeon: Celestino Willard MD;   Location: [facility];  Service: Thoracic      Family History:     Family History   Problem Relation Age of Onset    Diabetes Mother     Hypertension Mother     Stroke Mother     No Known Problems Father       Social History:     Social History     Socioeconomic History    Marital status: /Civil Union     Spouse name: None    Number of children: None    Years of education: None    Highest education level: None   Occupational History    None   Tobacco Use    Smoking status: Former     Current packs/day: 0.00     Average packs/day: 0.3 packs/day for 60.0 years (15.0 ttl pk-yrs)     Types: Cigarettes     Start date: 3/3/1963     Quit date: 3/3/2023     Years since quittin.0    Smokeless tobacco: Never    Tobacco comments:     have quit when diagnosed with cancer   Vaping Use    Vaping status: Never Used   Substance and Sexual Activity    Alcohol use: Not Currently     Comment: No alcohol in  approx 30 years    Drug use: Never    Sexual activity: Yes     Partners: Female   Other Topics Concern    None   Social History Narrative    None     Social Determinants of Health     Financial Resource Strain: Not on file   Food Insecurity: No Food Insecurity (3/18/2024)    Hunger Vital Sign     Worried About Running Out of Food in the Last Year: Never true     Ran Out of Food in the Last Year: Never true   Transportation Needs: No Transportation Needs (3/18/2024)    PRAPARE - Transportation     Lack of Transportation (Medical): No     Lack of Transportation (Non-Medical): No   Physical Activity: Not on file   Stress: Not on file   Social Connections: Not on file   Intimate Partner Violence: Not on file   Housing Stability: Low Risk  (3/18/2024)    Housing Stability Vital Sign     Unable to Pay for Housing in the Last Year: No     Number of Places Lived in the Last Year: 1     Unstable Housing in the Last Year: No      Medications and Allergies:     Current Outpatient Medications   Medication Sig Dispense Refill    b  complex vitamins capsule Take 1 capsule by mouth daily      levothyroxine 75 mcg tablet take 1 tablet by mouth once daily 90 tablet 2    Multiple Vitamin (multivitamin) tablet Take 1 tablet by mouth daily       No current facility-administered medications for this visit.     No Known Allergies   Immunizations:     Immunization History   Administered Date(s) Administered    COVID-19 MODERNA VACC 0.5 ML IM 03/31/2021, 04/28/2021, 01/11/2022    Influenza, high dose seasonal 0.7 mL 01/25/2023    Influenza, seasonal, injectable 10/10/2014    Pneumococcal Conjugate 13-Valent 10/25/2018    Tdap 06/18/2014      Health Maintenance:         Topic Date Due    Colorectal Cancer Screening  05/05/2033    Hepatitis C Screening  Completed    Lung Cancer Screening  Discontinued         Topic Date Due    Pneumococcal Vaccine: 65+ Years (2 of 2 - PPSV23 or PCV20) 10/25/2019    Influenza Vaccine (1) 09/01/2023    COVID-19 Vaccine (4 - 2023-24 season) 09/01/2023      Medicare Screening Tests and Risk Assessments:     Ankur is here for his Subsequent Wellness visit. Last Medicare Wellness visit information reviewed, patient interviewed and updates made to the record today.      Health Risk Assessment:   Patient rates overall health as good. Patient feels that their physical health rating is much better. Patient is very satisfied with their life. Eyesight was rated as same. Hearing was rated as same. Patient feels that their emotional and mental health rating is same. Patients states they are never, rarely angry. Patient states they are sometimes unusually tired/fatigued. Pain experienced in the last 7 days has been none. Patient states that he has experienced no weight loss or gain in last 6 months.     Depression Screening:   PHQ-2 Score: 0      Fall Risk Screening:   In the past year, patient has experienced: no history of falling in past year      Home Safety:  Patient does not have trouble with stairs inside or outside of their  home. Patient has working smoke alarms and has working carbon monoxide detector. Home safety hazards include: none.     Nutrition:   Current diet is Regular.     Medications:   Patient is not currently taking any over-the-counter supplements. Patient is able to manage medications.     Activities of Daily Living (ADLs)/Instrumental Activities of Daily Living (IADLs):   Walk and transfer into and out of bed and chair?: Yes  Dress and groom yourself?: Yes    Bathe or shower yourself?: Yes    Feed yourself? Yes  Do your laundry/housekeeping?: Yes  Manage your money, pay your bills and track your expenses?: Yes  Make your own meals?: Yes    Do your own shopping?: Yes    Previous Hospitalizations:   Any hospitalizations or ED visits within the last 12 months?: Yes    How many hospitalizations have you had in the last year?: 1-2    Advance Care Planning:   Living will: Yes    Durable POA for healthcare: Yes    Advanced directive: Yes    Advanced directive counseling given: Yes    ACP document given: Yes    Patient declined ACP directive: No    End of Life Decisions reviewed with patient: Yes    Provider agrees with end of life decisions: Yes      Cognitive Screening:   Provider or family/friend/caregiver concerned regarding cognition?: No    PREVENTIVE SCREENINGS      Cardiovascular Screening:    General: Screening Not Indicated and History Lipid Disorder      Diabetes Screening:     General: Screening Current      Colorectal Cancer Screening:     General: Screening Current      Prostate Cancer Screening:      Due for: PSA      Osteoporosis Screening:    General: Screening Not Indicated      Abdominal Aortic Aneurysm (AAA) Screening:    Risk factors include: age between 65-76 yo and tobacco use        Lung Cancer Screening:     General: Screening Not Indicated and History Lung Cancer      Hepatitis C Screening:    General: Screening Current    Screening, Brief Intervention, and Referral to Treatment  "(SBIRT)    Screening  Typical number of drinks in a day: 0  Typical number of drinks in a week: 0  Interpretation: Low risk drinking behavior.    AUDIT-C Screenin) How often did you have a drink containing alcohol in the past year? never  2) How many drinks did you have on a typical day when you were drinking in the past year? 0  3) How often did you have 6 or more drinks on one occasion in the past year? never    AUDIT-C Score: 0  Interpretation: Score 0-3 (male): Negative screen for alcohol misuse    Single Item Drug Screening:  How often have you used an illegal drug (including marijuana) or a prescription medication for non-medical reasons in the past year? never    Single Item Drug Screen Score: 0  Interpretation: Negative screen for possible drug use disorder    No results found.     Physical Exam:     /78   Pulse 61   Temp 97.6 °F (36.4 °C) (Tympanic)   Ht 5' 10\" (1.778 m)   Wt 78.9 kg (174 lb)   SpO2 96%   BMI 24.97 kg/m²     Physical Exam  Vitals and nursing note reviewed.   Constitutional:       General: He is not in acute distress.     Appearance: Normal appearance. He is well-developed. He is not ill-appearing, toxic-appearing or diaphoretic.   HENT:      Head: Normocephalic and atraumatic.      Right Ear: Tympanic membrane, ear canal and external ear normal. There is no impacted cerumen.      Left Ear: Tympanic membrane, ear canal and external ear normal. There is no impacted cerumen.      Nose: Nose normal. No congestion or rhinorrhea.      Mouth/Throat:      Mouth: Mucous membranes are moist.      Pharynx: No oropharyngeal exudate or posterior oropharyngeal erythema.   Eyes:      General: No scleral icterus.        Right eye: No discharge.         Left eye: No discharge.      Conjunctiva/sclera: Conjunctivae normal.      Pupils: Pupils are equal, round, and reactive to light.   Neck:      Thyroid: No thyromegaly.      Trachea: No tracheal deviation.   Cardiovascular:      Rate and " Rhythm: Normal rate and regular rhythm.      Pulses: Normal pulses.      Heart sounds: Normal heart sounds. No murmur heard.  Pulmonary:      Effort: Pulmonary effort is normal. No respiratory distress.      Breath sounds: Normal breath sounds. No stridor. No wheezing or rales.   Abdominal:      General: There is no distension.      Palpations: Abdomen is soft. There is no mass.      Tenderness: There is no abdominal tenderness. There is no guarding or rebound.      Hernia: No hernia is present.   Musculoskeletal:         General: No tenderness or deformity. Normal range of motion.      Cervical back: Normal range of motion and neck supple. No rigidity.      Right lower leg: No edema.      Left lower leg: No edema.   Lymphadenopathy:      Cervical: No cervical adenopathy.   Skin:     General: Skin is warm.      Findings: No erythema or rash.   Neurological:      Mental Status: He is alert and oriented to person, place, and time.      Sensory: No sensory deficit.      Motor: No abnormal muscle tone.      Deep Tendon Reflexes: Reflexes normal.   Psychiatric:         Mood and Affect: Mood normal.         Behavior: Behavior normal.         Thought Content: Thought content normal.         Judgment: Judgment normal.          Shaun Martinez,

## 2024-03-20 ENCOUNTER — OFFICE VISIT (OUTPATIENT)
Dept: CARDIAC SURGERY | Facility: CLINIC | Age: 74
End: 2024-03-20
Payer: MEDICARE

## 2024-03-20 VITALS
BODY MASS INDEX: 25.15 KG/M2 | TEMPERATURE: 97.5 F | WEIGHT: 175.71 LBS | SYSTOLIC BLOOD PRESSURE: 125 MMHG | OXYGEN SATURATION: 97 % | HEIGHT: 70 IN | DIASTOLIC BLOOD PRESSURE: 77 MMHG | HEART RATE: 55 BPM | RESPIRATION RATE: 14 BRPM

## 2024-03-20 DIAGNOSIS — C34.92 ADENOCARCINOMA, LUNG, LEFT (HCC): Primary | ICD-10-CM

## 2024-03-20 PROCEDURE — 99213 OFFICE O/P EST LOW 20 MIN: CPT | Performed by: THORACIC SURGERY (CARDIOTHORACIC VASCULAR SURGERY)

## 2024-03-20 NOTE — ASSESSMENT & PLAN NOTE
Mr Marc is about 9 months out from a left lower lobectomy for Stage IIA adenocarcinoma. He is doing very well. His most recent CT chest 7mm left upper lobe ground glass opacity remains stable and is likely atelectasis vs scar. He has no new or enlarging nodules and no concern for recurrent disease. We will return to routine surveillance per NCCN guidelines with a CT chest in 6 months. We will continue to monitor with CT chest and follow-up every 6 months x 3 years and then yearly from then on out. All questions were answered and he is in agreement with this plan.

## 2024-03-20 NOTE — PROGRESS NOTES
Thoracic Follow-Up  Assessment/Plan:    Adenocarcinoma, lung, left (HCC)  Mr Monico is about 9 months out from a left lower lobectomy for Stage IIA adenocarcinoma. He is doing very well. His most recent CT chest 7mm left upper lobe ground glass opacity remains stable and is likely atelectasis vs scar. He has no new or enlarging nodules and no concern for recurrent disease. We will return to routine surveillance per NCCN guidelines with a CT chest in 6 months. We will continue to monitor with CT chest and follow-up every 6 months x 3 years and then yearly from then on out. All questions were answered and he is in agreement with this plan.        Diagnoses and all orders for this visit:    Adenocarcinoma, lung, left (HCC)  -     CT chest wo contrast; Future          Thoracic History         Cancer Staging   Adenocarcinoma, lung, left (HCC)  Staging form: Lung, AJCC 8th Edition  - Clinical stage from 6/8/2023: Stage IIA (ycT2b, cN0, cM0) - Signed by Angela Mills PA-C on 6/28/2023  Histopathologic type: Adenocarcinoma, NOS  Stage prefix: Post-therapy  Oncology History   Adenocarcinoma, lung, left (HCC)   3/6/2023 Initial Diagnosis    Adenocarcinoma, lung, left (HCC)     3/28/2023 -  Chemotherapy    palonosetron (ALOXI), 0.25 mg, Intravenous, Once, 3 of 3 cycles  Administration: 0.25 mg (3/28/2023), 0.25 mg (4/18/2023), 0.25 mg (5/9/2023)  fosaprepitant (EMEND) IVPB, 150 mg, Intravenous, Once, 3 of 3 cycles  Administration: 150 mg (3/28/2023), 150 mg (4/18/2023), 150 mg (5/9/2023)  nivolumab (OPDIVO) IVPB, 360 mg, Intravenous, Once, 2 of 2 cycles  Administration: 360 mg (3/28/2023), 360 mg (4/18/2023)  CARBOplatin (PARAPLATIN) IVPB (GOG AUC DOSING), 561.5 mg, Intravenous, Once, 3 of 3 cycles  Administration: 561.5 mg (3/28/2023), 532 mg (4/18/2023), 551 mg (5/9/2023)  PACLItaxel (TAXOL) chemo IVPB, 175 mg/m2 = 336 mg, Intravenous, Once, 3 of 3 cycles  Administration: 336 mg (3/28/2023), 336 mg (4/18/2023), 336 mg  (5/9/2023)     6/8/2023 -  Cancer Staged    Staging form: Lung, AJCC 8th Edition  - Clinical stage from 6/8/2023: Stage IIA (ycT2b, cN0, cM0) - Signed by Angela Mills PA-C on 6/28/2023  Histopathologic type: Adenocarcinoma, NOS  Stage prefix: Post-therapy       6/8/2023 Surgery    Left robotic-assisted lower lobectomy with MLND         Subjective:    Patient ID: Ankur Marc is a 73 y.o. male. ECOG 0    HPI  Mr Marc is a 73 year old male with history of Stage IIA left lower lobe adenocarcinoma s/p neoadjuvant systemic therapy and robotic-assisted left lower lobectomy on 6/8/2023 who presents today for review of CT chest.     He was last seen in our office on 11/27/23 at which his CT chest showed a new 7mm GGO in the posterior left upper lobe and thus he was scheduled for a 3 month CT chest f/u to re-evaluate this area and now presents for review.     CT chest on 2/26/24 was personally reviewed by myself in PACS and shows stability of the 7mm left upper lobe opacity likely representing atelectasis vs scarring. No new or enlarging nodes. No mediastinal nor hilar adenopathy.     On discussion he is feeling very well. He exercises every day. He has no SOB, cough, hemoptysis, fevers/chills or unintentional weight loss.     The following portions of the patient's history were reviewed and updated as appropriate: allergies, current medications, past family history, past medical history, past social history, past surgical history, and problem list.    Review of Systems   Constitutional:  Negative for chills, diaphoresis and unexpected weight change.   HENT: Negative.     Eyes: Negative.    Respiratory:  Negative for cough, shortness of breath and wheezing.    Cardiovascular:  Negative for chest pain and leg swelling.   Gastrointestinal:  Negative for abdominal pain, diarrhea and nausea.   Endocrine: Negative.    Genitourinary: Negative.    Musculoskeletal: Negative.    Skin: Negative.    Allergic/Immunologic:  "Negative.    Neurological: Negative.    Hematological:  Negative for adenopathy. Does not bruise/bleed easily.   Psychiatric/Behavioral: Negative.     All other systems reviewed and are negative.        Objective:   Physical Exam  Vitals reviewed.   Constitutional:       General: He is not in acute distress.     Appearance: Normal appearance. He is not ill-appearing.   HENT:      Head: Normocephalic.      Nose: Nose normal.      Mouth/Throat:      Mouth: Mucous membranes are moist.   Eyes:      Pupils: Pupils are equal, round, and reactive to light.   Cardiovascular:      Rate and Rhythm: Normal rate and regular rhythm.      Heart sounds: Normal heart sounds.   Pulmonary:      Effort: Pulmonary effort is normal.      Breath sounds: Normal breath sounds. No wheezing, rhonchi or rales.   Abdominal:      Palpations: Abdomen is soft.   Musculoskeletal:         General: No swelling. Normal range of motion.   Lymphadenopathy:      Cervical: No cervical adenopathy.   Skin:     General: Skin is warm.   Neurological:      General: No focal deficit present.      Mental Status: He is alert and oriented to person, place, and time.   Psychiatric:         Mood and Affect: Mood normal.     /77 (BP Location: Left arm, Patient Position: Sitting, Cuff Size: Standard)   Pulse 55   Temp 97.5 °F (36.4 °C) (Temporal)   Resp 14   Ht 5' 10\" (1.778 m)   Wt 79.7 kg (175 lb 11.3 oz)   SpO2 97%   BMI 25.21 kg/m²     XR chest pa & lateral    Result Date: 7/28/2023  Impression Left lower lobectomy with mild elevation of the left hemidiaphragm with no acute disease. Workstation performed: [id number]     XR chest pa & lateral    Result Date: 6/28/2023  Impression No acute cardiopulmonary disease. Workstation performed: [id number]     XR chest pa & lateral    Result Date: 6/10/2023  Impression Left chest tube removal after left lower lobectomy with no pneumothorax. Workstation performed: [id number]      CT chest wo contrast    Result " Date: 3/4/2024  Narrative CT CHEST WITHOUT IV CONTRAST INDICATION: C34.92: Malignant neoplasm of unspecified part of left bronchus or lung. COMPARISON: CT chest 11/20/2023 TECHNIQUE: CT examination of the chest was performed without intravenous contrast. Multiplanar 2D reformatted images were created from the source data. This examination, like all CT scans performed in the [facility], was performed utilizing techniques to minimize radiation dose exposure, including the use of iterative reconstruction and automated exposure control. Radiation dose length product (DLP) for this visit: 396.56 mGy-cm FINDINGS: LUNGS: Postsurgical changes of left lower lobectomy. Persistent area of groundglass opacity in the posterior left upper lobe (series 3 image 149) likely representing atelectasis versus scarring. Right basilar atelectasis. No suspicious pulmonary nodules.  There is no tracheal or endobronchial lesion. PLEURA: Unremarkable. HEART/GREAT VESSELS: Coronary artery calcifications present. No thoracic aortic aneurysm. MEDIASTINUM AND OBIE: Unremarkable. CHEST WALL AND LOWER NECK: Unremarkable. VISUALIZED STRUCTURES IN THE UPPER ABDOMEN: Left renal cysts. OSSEOUS STRUCTURES: No acute fracture or destructive osseous lesion.     Impression No recurrent or metastatic disease. Workstation performed: [id number]     CT chest wo contrast    Result Date: 11/20/2023  Narrative CT CHEST WITHOUT IV CONTRAST INDICATION:   C34.92: Malignant neoplasm of unspecified part of left bronchus or lung. Treatment has included neoadjuvant chemotherapy/immunotherapy and left lower lobectomy (June 8, 2023) COMPARISON: Chest CT January 30, 2023. Correlation PET/CT May 23, 2023 TECHNIQUE: CT examination of the chest was performed without intravenous contrast. Multiplanar 2D reformatted images were created from the source data. This examination, like all CT scans performed in the [facility], was performed utilizing  techniques to minimize radiation dose exposure, including the use of iterative reconstruction and automated exposure control. Radiation dose length product (DLP) for this visit:  400.33 mGy-cm FINDINGS: LUNGS: Post left lower lobectomy. Mild emphysema. Mild bronchiectasis. 0.7 cm area of groundglass in the posterior left upper lobe is new since prior study and is likely focal atelectasis (series 3, image 176). PLEURA:  Unremarkable. HEART/GREAT VESSELS: Heart is unremarkable for patient's age.  No thoracic aortic aneurysm. MEDIASTINUM AND OBIE:  Unremarkable. CHEST WALL AND LOWER NECK:  Unremarkable. VISUALIZED STRUCTURES IN THE UPPER ABDOMEN: 3 cm left renal cyst. OSSEOUS STRUCTURES: Spinal degenerative changes are noted.  No acute fracture or destructive osseous lesion.     Impression No recurrent or metastatic disease in the chest following left lower lobectomy. Workstation performed: [id number]     No CT Chest,Abdomen,Pelvis results available for this patient.   NM PET CT skull base to mid thigh    Result Date: 5/23/2023  Narrative PET/CT SCAN INDICATION: C34.92: Malignant neoplasm of unspecified part of left bronchus or lung C34.32: Malignant neoplasm of lower lobe, left bronchus or lung Restaging study following neoadjuvant therapy, lung cancer. MODIFIER: PS COMPARISON: 3/16/2023, CT abdomen and pelvis 4/21/2023 CELL TYPE: Left lower lobe adenocarcinoma TECHNIQUE:   8.3 mCi F-18-FDG administered IV. Multiplanar attenuation corrected and non attenuation corrected PET images are available for interpretation, and contiguous, low dose, axial CT sections were obtained from the skull base through the femurs. Intravenous contrast material was not utilized. This examination, like all CT scans performed in the [facility], was performed utilizing techniques to minimize radiation dose exposure, including the use of iterative reconstruction and automated exposure control. Fasting serum glucose: 97 mg/dl  FINDINGS: VISUALIZED BRAIN: No acute abnormalities are seen. HEAD/NECK: -No suspicious FDG avid cervical adenopathy - Prior study demonstrated increased activity of the left mandible, SUV max was 5.9 now 3.2. This may be associated with diminished inflammation associated with dental/periodontal disease - Mild bilateral symmetric and diffuse thyroid activity can be a normal physiologic finding or related to thyroid dysfunction; correlate with thyroid function tests as clinically indicated CT images: Otherwise unremarkable CHEST: -Again seen is the semisolid left lower lobe mass earlier up to 4.6 cm in size currently 3.4 cm. Prior SUV max was 2.2, currently 2.1. - No new parenchymal abnormalities - No development of FDG avid mediastinal or hilar adenopathy CT images: Coronary artery calcification. No pericardial or pleural effusion ABDOMEN: No FDG avid soft tissue lesions are seen. CT images: Presumed bilateral renal cysts. Moderately increased stool within the colon. Small periumbilical adipose containing hernia. PELVIS: No FDG avid soft tissue lesions are seen. CT images: Left gluteal intramuscular lipoma. OSSEOUS STRUCTURES: No FDG avid lesions are seen. CT images: No significant findings.     Impression 1. The semisolid left lower lobe mass has diminished in size. FDG avidity has not significantly changed. 2. No new FDG-avid abnormalities within the thorax. No evidence of FDG avid metastatic disease in the neck, abdomen, pelvis or skeleton 3. Partially diminished FDG avidity of the left mandible Workstation performed: [id number]      No Barium Swallow results available for this patient.

## 2024-05-01 PROBLEM — Z00.00 MEDICARE ANNUAL WELLNESS VISIT, SUBSEQUENT: Status: RESOLVED | Noted: 2024-03-18 | Resolved: 2024-05-01

## 2024-08-19 ENCOUNTER — APPOINTMENT (OUTPATIENT)
Dept: RADIOLOGY | Facility: CLINIC | Age: 74
End: 2024-08-19
Payer: MEDICARE

## 2024-08-19 ENCOUNTER — OFFICE VISIT (OUTPATIENT)
Dept: URGENT CARE | Facility: CLINIC | Age: 74
End: 2024-08-19
Payer: MEDICARE

## 2024-08-19 VITALS
WEIGHT: 173 LBS | HEART RATE: 61 BPM | DIASTOLIC BLOOD PRESSURE: 56 MMHG | HEIGHT: 70 IN | BODY MASS INDEX: 24.77 KG/M2 | TEMPERATURE: 97.8 F | SYSTOLIC BLOOD PRESSURE: 107 MMHG | RESPIRATION RATE: 16 BRPM | OXYGEN SATURATION: 98 %

## 2024-08-19 VITALS
SYSTOLIC BLOOD PRESSURE: 107 MMHG | RESPIRATION RATE: 16 BRPM | DIASTOLIC BLOOD PRESSURE: 56 MMHG | OXYGEN SATURATION: 98 % | BODY MASS INDEX: 24.84 KG/M2 | WEIGHT: 173.1 LBS | HEART RATE: 61 BPM | TEMPERATURE: 97.8 F

## 2024-08-19 DIAGNOSIS — S49.92XA SHOULDER INJURY, LEFT, INITIAL ENCOUNTER: Primary | ICD-10-CM

## 2024-08-19 DIAGNOSIS — S49.92XA SHOULDER INJURY, LEFT, INITIAL ENCOUNTER: ICD-10-CM

## 2024-08-19 DIAGNOSIS — S46.092A OTHER INJURY OF MUSCLE(S) AND TENDON(S) OF THE ROTATOR CUFF OF LEFT SHOULDER, INITIAL ENCOUNTER: Primary | ICD-10-CM

## 2024-08-19 PROCEDURE — 73030 X-RAY EXAM OF SHOULDER: CPT

## 2024-08-19 PROCEDURE — 99213 OFFICE O/P EST LOW 20 MIN: CPT | Performed by: FAMILY MEDICINE

## 2024-08-19 PROCEDURE — G0463 HOSPITAL OUTPT CLINIC VISIT: HCPCS | Performed by: FAMILY MEDICINE

## 2024-08-19 NOTE — PROGRESS NOTES
[facility]        NAME: Ankur Marc is a 74 y.o. male  : 1950    MRN: [id number]  DATE: 2024  TIME: 4:52 PM    Assessment and Plan   Other injury of muscle(s) and tendon(s) of the rotator cuff of left shoulder, initial encounter [S46.302A]  1. Other injury of muscle(s) and tendon(s) of the rotator cuff of left shoulder, initial encounter  Ambulatory Referral to Physical Therapy    Ambulatory Referral to Orthopedic Surgery        XR of the left shoulder reviewed and discussed with the patient which shows no evidence of fractures or dislocations. Mild glenohumeral joint degenerative changes.     History and exam consistent with a rotator cuff injury, strain versus tear. Positive Uriostegui's could also indicate labral involvement. Advised patient of initial management consisting of PT and further evaluation by orthopedics.     Patient Instructions     Pain relief:   Tylenol   NSAIDs (Motrin, Aleve, Advil)   Ice/warm compresses   OTC Voltaren gel   Gentle home exercises  Activity as tolerated  Referral placed to orthopedics. Please call the [facility] scheduling office at [phone] to schedule your appointment.     If tests are performed, our office will contact you with results only if changes need to made to the care plan discussed with you at the visit. You can review your full results on [facility].    Chief Complaint     Chief Complaint   Patient presents with    Shoulder Injury     Left shoulder          History of Present Illness       74-year-old male presents to the urgent care for evaluation of left shoulder pain.  The patient states last week he was pushing a heavy generator around his yard when he felt pain in both of his shoulders.  He states the pain in his right shoulder has resolved, however the pain is left shoulder has only worsened.  When asked where the pain is located he points along the anterior aspect.  He complains of pain with any type of  movement, especially overhead and abduction.  He notes that he has to lift his left arm with his right arm because of the pain and weakness.  The patient denies any numbness or tingling.  He admits many years ago he did sustain bilateral shoulder fractures, though healed without surgery.  He has not had any issues with his shoulder since.  For symptom relief the patient has been using Tylenol.        Review of Systems   Review of Systems   Constitutional:  Negative for chills and fever.   HENT:  Negative for ear pain and sore throat.    Eyes:  Negative for pain and visual disturbance.   Respiratory:  Negative for cough and shortness of breath.    Cardiovascular:  Negative for chest pain and palpitations.   Gastrointestinal:  Negative for abdominal pain and vomiting.   Genitourinary:  Negative for dysuria and hematuria.   Musculoskeletal:  Positive for arthralgias. Negative for back pain.   Skin:  Negative for color change and rash.   Neurological:  Negative for seizures and syncope.   All other systems reviewed and are negative.        Current Medications       Current Outpatient Medications:     b complex vitamins capsule, Take 1 capsule by mouth daily, Disp: , Rfl:     levothyroxine 75 mcg tablet, take 1 tablet by mouth once daily, Disp: 90 tablet, Rfl: 2    Multiple Vitamin (multivitamin) tablet, Take 1 tablet by mouth daily, Disp: , Rfl:     Current Allergies     Allergies as of 08/19/2024    (No Known Allergies)            The following portions of the patient's history were reviewed and updated as appropriate: allergies, current medications, past family history, past medical history, past social history, past surgical history and problem list.     Past Medical History:   Diagnosis Date    BPH with obstruction/lower urinary tract symptoms     Cancer (HCC) 2/27/2023    biopsy results    Cystitis, chronic     Epididymo-orchitis     Feeling of incomplete bladder emptying     Frequency of micturition     Nocturia      Other microscopic hematuria     Poor urinary stream     Simple renal cyst        Past Surgical History:   Procedure Laterality Date    CYSTOSCOPY  2012    FRACTURE SURGERY      legs, arms, shoulder, back after accident    IR BIOPSY LUNG  02/23/2023    MI BRNCHSC INCL FLUOR GDNCE DX W/CELL WASHG SPX N/A 6/8/2023    Procedure: BRONCHOSCOPY FLEXIBLE;  Surgeon: Celestino iWllard MD;  Location: [facility];  Service: Thoracic    MI THORACOSCOPY W/LOBECTOMY SINGLE LOBE Left 6/8/2023    Procedure: Left robotic assisted lower lobectomy w/ mediastinal lymph node dissection;  Surgeon: Celestino Willard MD;  Location: [facility];  Service: Thoracic       Family History   Problem Relation Age of Onset    Diabetes Mother     Hypertension Mother     Stroke Mother     No Known Problems Father          Medications have been verified.        Objective   There were no vitals taken for this visit.       Physical Exam     Physical Exam  Vitals and nursing note reviewed.   Constitutional:       General: He is not in acute distress.     Appearance: Normal appearance. He is not ill-appearing.   HENT:      Head: Normocephalic and atraumatic.      Nose: Nose normal.      Mouth/Throat:      Mouth: Mucous membranes are moist.      Pharynx: Oropharynx is clear.   Eyes:      Extraocular Movements: Extraocular movements intact.      Pupils: Pupils are equal, round, and reactive to light.   Cardiovascular:      Rate and Rhythm: Normal rate.      Pulses: Normal pulses.   Pulmonary:      Effort: Pulmonary effort is normal. No respiratory distress.   Musculoskeletal:      Cervical back: Normal range of motion.      Comments: Left shoulder:  Skin without lesions, ecchymosis, erythema, warmth, swelling.  The patient denies any significant tenderness throughout the posterior lateral aspect shoulder, deltoid muscle, rotator cuff insertion.  Patient is full active range of motion of the digits, wrist, elbow without complaint.  Full supination and  pronation.  He does complain of some pain along the anterior aspect of the shoulder with resisted pronation.  Active shoulder forward flexion limited to approximately 110 degrees, abduction 110 degrees.  Passively I am able to forward flex to 130, abduct 120 degrees.  External rotation approximately 70 degrees with pain.  4+/5 strength resisted forward flexion and abduction.  Positive Go, positive [place]'s with worsening pain and thumbs up position.  Sensation motor intact along the radial, median, ulnar nerve distributions.  Strong radial pulse.  Brisk cap refill in all fingers.   Skin:     General: Skin is warm and dry.      Capillary Refill: Capillary refill takes less than 2 seconds.   Neurological:      General: No focal deficit present.      Mental Status: He is alert and oriented to person, place, and time.   Psychiatric:         Mood and Affect: Mood normal.         Behavior: Behavior normal.

## 2024-08-19 NOTE — PATIENT INSTRUCTIONS
Pain relief:   Tylenol   NSAIDs (Motrin, Aleve, Advil)   Ice/warm compresses   OTC Voltaren gel   Gentle home exercises  Activity as tolerated  Referral placed to orthopedics. Please call the [facility] scheduling office at [phone] to schedule your appointment.

## 2024-08-20 ENCOUNTER — EVALUATION (OUTPATIENT)
Dept: PHYSICAL THERAPY | Facility: CLINIC | Age: 74
End: 2024-08-20
Payer: MEDICARE

## 2024-08-20 VITALS — DIASTOLIC BLOOD PRESSURE: 84 MMHG | SYSTOLIC BLOOD PRESSURE: 151 MMHG

## 2024-08-20 DIAGNOSIS — S46.092A OTHER INJURY OF MUSCLE(S) AND TENDON(S) OF THE ROTATOR CUFF OF LEFT SHOULDER, INITIAL ENCOUNTER: ICD-10-CM

## 2024-08-20 PROCEDURE — 97112 NEUROMUSCULAR REEDUCATION: CPT | Performed by: PHYSICAL THERAPIST

## 2024-08-20 PROCEDURE — 97162 PT EVAL MOD COMPLEX 30 MIN: CPT | Performed by: PHYSICAL THERAPIST

## 2024-08-20 PROCEDURE — 97110 THERAPEUTIC EXERCISES: CPT | Performed by: PHYSICAL THERAPIST

## 2024-08-20 NOTE — PROGRESS NOTES
PT Evaluation     Today's date: 2024  Patient name: Ankur Marc  : 1950  MRN: [id number]  Referring provider: Shaun Oswald*  Dx:   Encounter Diagnosis     ICD-10-CM    1. Other injury of muscle(s) and tendon(s) of the rotator cuff of left shoulder, initial encounter  S46.092A Ambulatory Referral to Physical Therapy                     Assessment  Impairments: abnormal coordination, abnormal muscle firing, abnormal or restricted ROM, abnormal movement, activity intolerance, impaired physical strength, lacks appropriate home exercise program, pain with function, safety issue, weight-bearing intolerance and poor posture   Functional limitations: Difficulty with lifting at and above shoulder height.  Symptom irritability: moderate    Assessment details: Ankur Marc is a 74 y.o. male who presents to outpatient PT with a  Other injury of muscle(s) and tendon(s) of the rotator cuff of left shoulder, initial encounter.  No further referral appears necessary at this time based upon examination results. Pt presents with decreased strength, ROM, balance, functional activity tolerance, and pain with movement in his left shoulder  which is  limiting his ability to perform the aforementioned functional activities.  Etiologic factors include repetitive poor body mechanics. Prognosis is good given HEP compliance and PT 2-3/wk.  Please contact me if you have any questions or recommendations.  Thank you for the opportunity to share in  Ankur tati.     Understanding of Dx/Px/POC: good     Prognosis: good    Goals  STG to be achieved in 4 weeks     1. Pt will reduce subjective pain rating by at least 50 percent the help facilitate return to PLOF  2. Pt will improve Left shoulder AROM by at least 10 degrees to help promote improved functional activity tolerance   3. Pt will improve Left shoulder MMT scores by at least 1/2 grade to promote improved functional activity tolerance      LTG to be achieved in  6-8 weeks   1. Pt will be complaint with HEP   2. Pt will improve ROM to WFL, to help facilitate independence with ADL's, IADL's, and functional activities   3. Pt will improve Strength to WFL to help facilitate independence with ADL's, IADL's, and functional activities   4. Pt will have no limitations with reaching at and above shoulder height to help promote safe return to plof           Plan  Patient would benefit from: skilled physical therapy    Planned therapy interventions: ADL training, balance, balance/weight bearing training, flexibility, functional ROM exercises, gait training, graded activity, graded exercise, graded motor, home exercise program, joint mobilization, manual therapy, neuromuscular re-education, patient education, postural training, strengthening, stretching, therapeutic activities and therapeutic exercise    Frequency: 2x week  Duration in weeks: 12  Plan of Care beginning date: 2024  Plan of Care expiration date: 2024  Treatment plan discussed with: patient, PTA and referring physician        Subjective Evaluation    History of Present Illness  Mechanism of injury: Pt is a 74 year old male who is known to this clinic. He reports with chief complaint of left shoulder pain for the past week. Reports he was pulling a generator last week, when his shoulder started to bother him. Pain localized to lateral aspect of the left shoulder worse with left shoulder AROM in all planes. Goals for PT to improve left shoulder.   Patient Goals  Patient goals for therapy: increased strength, decreased pain and increased motion  Patient goal: Improve the left shoulder  Pain  Current pain ratin  At best pain ratin  At worst pain ratin  Quality: sharp  Relieving factors: change in position, relaxation, rest and support  Aggravating factors: lifting and overhead activity  Progression: worsening        Objective     Active Range of Motion   Left Shoulder   Flexion: 140 degrees   Abduction:  90 degrees   External rotation BTH: Active external rotation behind the head: unable.   Internal rotation BTB: Active internal rotation behind the back: T12.     Passive Range of Motion   Left Shoulder   Normal passive range of motion    Strength/Myotome Testing     Left Shoulder     Planes of Motion   Flexion: 4-   Abduction: 4-   External rotation BTH: 4-   Internal rotation BTB: 4-     Tests     Left Shoulder   Positive empty can, full can, Neer's and painful arc.              Precautions: Hx of Lung CA       Manuals             PROM Left shoulder                                                     Neuro Re-Ed             MTP LTP              TB ER IR              UBE for postural corrections  5/5 120             0-45              Supine ABC             Supine cane                           Ther Ex             Pulley  5''10x flex/ scap             Finger ladder              Upper trap stretch             Levator stretch                                                                  Ther Activity                                       Gait Training                                       Modalities

## 2024-08-23 ENCOUNTER — OFFICE VISIT (OUTPATIENT)
Dept: PHYSICAL THERAPY | Facility: CLINIC | Age: 74
End: 2024-08-23
Payer: MEDICARE

## 2024-08-23 DIAGNOSIS — S46.092A OTHER INJURY OF MUSCLE(S) AND TENDON(S) OF THE ROTATOR CUFF OF LEFT SHOULDER, INITIAL ENCOUNTER: Primary | ICD-10-CM

## 2024-08-23 PROCEDURE — 97110 THERAPEUTIC EXERCISES: CPT

## 2024-08-23 PROCEDURE — 97112 NEUROMUSCULAR REEDUCATION: CPT

## 2024-08-23 PROCEDURE — 97140 MANUAL THERAPY 1/> REGIONS: CPT

## 2024-08-23 NOTE — PROGRESS NOTES
"Daily Note     Today's date: 2024  Patient name: Ankur Marc  : 1950  MRN: [id number]  Referring provider: Shaun Oswald*  Dx:   Encounter Diagnosis     ICD-10-CM    1. Other injury of muscle(s) and tendon(s) of the rotator cuff of left shoulder, initial encounter  S46.092A                      Subjective: Pt reports his L shoulder is feeling a little better.       Objective: See treatment diary below      Assessment: Tolerated treatment well. Initiated exercises as outlined in POC. Issued handouts and reviewed with patient for HEP.  Pt reports he does go to gym but has held UE exercises since injury. Reports he planned to begin UE workout again. Encouraged patient to perform UBC only at gym at this time until L shoulder symptoms decrease. Patient demonstrated fatigue post treatment, exhibited good technique with therapeutic exercises, and would benefit from continued PT.       Plan: Continue per plan of care.      Precautions: Hx of Lung CA       Manuals            PROM Left shoulder    MJC                                                 Neuro Re-Ed             MTP LTP    RTB 5\" x20  GTB NV          TB ER IR    RTB  X20 ea          UBE for postural corrections   120 5'/5'  120 rpm 5'/5  120 rpm           0-45              Supine ABC             Supine cane    5\" x20                       Ther Ex             Pulley  5''10x flex/ scap   5\" x10 ea  Flex/  scap          Finger ladder    5\" x10          Upper trap stretch   30\" x3 ea          Levator stretch    30\" x3                                                              Ther Activity                                       Gait Training                                       Modalities                                            "

## 2024-08-27 ENCOUNTER — OFFICE VISIT (OUTPATIENT)
Dept: PHYSICAL THERAPY | Facility: CLINIC | Age: 74
End: 2024-08-27
Payer: MEDICARE

## 2024-08-27 DIAGNOSIS — S46.092A OTHER INJURY OF MUSCLE(S) AND TENDON(S) OF THE ROTATOR CUFF OF LEFT SHOULDER, INITIAL ENCOUNTER: Primary | ICD-10-CM

## 2024-08-27 PROCEDURE — 97140 MANUAL THERAPY 1/> REGIONS: CPT

## 2024-08-27 PROCEDURE — 97110 THERAPEUTIC EXERCISES: CPT

## 2024-08-27 NOTE — PROGRESS NOTES
"Daily Note     Today's date: 2024  Patient name: Ankur Marc  : 1950  MRN: [id number]  Referring provider: Shaun Oswald*  Dx:   Encounter Diagnosis     ICD-10-CM    1. Other injury of muscle(s) and tendon(s) of the rotator cuff of left shoulder, initial encounter  S46.092A                      Subjective: Pt reports L shoulder is improving, pain just with certain motions now.       Objective: See treatment diary below      Assessment: Tolerated treatment well. No significant limitation noted L shoulder PROM today. Gradual progression with exercise for scapular strengthening today. Patient demonstrated fatigue post treatment, exhibited good technique with therapeutic exercises, and would benefit from continued PT      Plan: Continue per plan of care.      Precautions: Hx of Lung CA       Manuals           PROM Left shoulder    C C                                                Neuro Re-Ed             MTP LTP    RTB 5\" x20  GTB NV GTB  5\" x20         TB ER IR    RTB  X20 ea RTB  X20 ea         UBE for postural corrections  5/ 120 5'/5'  120 rpm 5'/5  120 rpm 5'/5'  120 rpm          0-45              Supine ABC             Supine cane    5\" x20 5\" x20                      Ther Ex             Pulley  5''10x flex/ scap   5\" x10 ea  Flex/  scap 5\" x10 ea  Flex/  scap         Finger ladder    5\" x10 5\" x10         Upper trap stretch   30\" x3 ea 30\" x3 ea         Levator stretch    30\" x3 30\" x3                                                             Ther Activity                                       Gait Training                                       Modalities                                              "

## 2024-08-30 ENCOUNTER — OFFICE VISIT (OUTPATIENT)
Dept: PHYSICAL THERAPY | Facility: CLINIC | Age: 74
End: 2024-08-30
Payer: MEDICARE

## 2024-08-30 DIAGNOSIS — S46.092A OTHER INJURY OF MUSCLE(S) AND TENDON(S) OF THE ROTATOR CUFF OF LEFT SHOULDER, INITIAL ENCOUNTER: Primary | ICD-10-CM

## 2024-08-30 PROCEDURE — 97140 MANUAL THERAPY 1/> REGIONS: CPT

## 2024-08-30 PROCEDURE — 97112 NEUROMUSCULAR REEDUCATION: CPT

## 2024-08-30 PROCEDURE — 97110 THERAPEUTIC EXERCISES: CPT

## 2024-08-30 NOTE — PROGRESS NOTES
"Daily Note     Today's date: 2024  Patient name: Ankur Marc  : 1950  MRN: [id number]  Referring provider: Shaun Oswald*  Dx:   Encounter Diagnosis     ICD-10-CM    1. Other injury of muscle(s) and tendon(s) of the rotator cuff of left shoulder, initial encounter  S46.092A                      Subjective: Pt reports his shoulder is feeling better.       Objective: See treatment diary below      Assessment: Tolerated treatment well. Progressed with stabilization exercises as outlined in POC. Patient demonstrated fatigue post treatment, exhibited good technique with therapeutic exercises, and would benefit from continued PT      Plan: Continue per plan of care.      Precautions: Hx of Lung CA       Manuals          PROM Left shoulder    MJC MJC MJC                                               Neuro Re-Ed             MTP LTP    RTB 5\" x20  GTB NV GTB  5\" x20 GTB  5\" x20        TB ER IR    RTB  X20 ea RTB  X20 ea GTB  x20        UBE for postural corrections  5/ 120 5'/5'  120 rpm 5'/5  120 rpm 5'/5'  120 rpm 5'/5'  100 rpm         0-45      2x10        Supine ABC     2x10        Supine cane    5\" x20 5\" x20 5\" x20                     Ther Ex             Pulley  5''10x flex/ scap   5\" x10 ea  Flex/  scap 5\" x10 ea  Flex/  scap 5\" x10 ea  Flex/  scap        Finger ladder    5\" x10 5\" x10 5\" x10        Upper trap stretch   30\" x3 ea 30\" x3 ea 30\" x3 ea        Levator stretch    30\" x3 30\" x3 30\" x3                                                            Ther Activity                                       Gait Training                                       Modalities                                                "

## 2024-09-04 ENCOUNTER — OFFICE VISIT (OUTPATIENT)
Dept: PHYSICAL THERAPY | Facility: CLINIC | Age: 74
End: 2024-09-04
Payer: MEDICARE

## 2024-09-04 ENCOUNTER — APPOINTMENT (OUTPATIENT)
Dept: LAB | Facility: HOSPITAL | Age: 74
End: 2024-09-04
Payer: MEDICARE

## 2024-09-04 ENCOUNTER — HOSPITAL ENCOUNTER (OUTPATIENT)
Dept: CT IMAGING | Facility: HOSPITAL | Age: 74
Discharge: HOME | End: 2024-09-04
Payer: MEDICARE

## 2024-09-04 DIAGNOSIS — E78.00 HYPERCHOLESTEROLEMIA: ICD-10-CM

## 2024-09-04 DIAGNOSIS — C34.92 ADENOCARCINOMA, LUNG, LEFT (HCC): ICD-10-CM

## 2024-09-04 DIAGNOSIS — E03.9 ACQUIRED HYPOTHYROIDISM: ICD-10-CM

## 2024-09-04 DIAGNOSIS — Z12.5 SCREENING FOR PROSTATE CANCER: ICD-10-CM

## 2024-09-04 DIAGNOSIS — S46.092A OTHER INJURY OF MUSCLE(S) AND TENDON(S) OF THE ROTATOR CUFF OF LEFT SHOULDER, INITIAL ENCOUNTER: Primary | ICD-10-CM

## 2024-09-04 DIAGNOSIS — D69.6 PLATELETS DECREASED (HCC): ICD-10-CM

## 2024-09-04 LAB
ALBUMIN SERPL BCG-MCNC: 4.5 G/DL (ref 3.5–5)
ALP SERPL-CCNC: 37 U/L (ref 34–104)
ALT SERPL W P-5'-P-CCNC: 13 U/L (ref 7–52)
ANION GAP SERPL CALCULATED.3IONS-SCNC: 5 MMOL/L (ref 4–13)
AST SERPL W P-5'-P-CCNC: 17 U/L (ref 13–39)
BASOPHILS # BLD AUTO: 0.04 THOUSANDS/ÂΜL (ref 0–0.1)
BASOPHILS NFR BLD AUTO: 1 % (ref 0–1)
BILIRUB SERPL-MCNC: 0.59 MG/DL (ref 0.2–1)
BUN SERPL-MCNC: 16 MG/DL (ref 5–25)
CALCIUM SERPL-MCNC: 9.7 MG/DL (ref 8.4–10.2)
CHLORIDE SERPL-SCNC: 107 MMOL/L (ref 96–108)
CHOLEST SERPL-MCNC: 225 MG/DL
CO2 SERPL-SCNC: 30 MMOL/L (ref 21–32)
CREAT SERPL-MCNC: 0.92 MG/DL (ref 0.6–1.3)
EOSINOPHIL # BLD AUTO: 0.09 THOUSAND/ÂΜL (ref 0–0.61)
EOSINOPHIL NFR BLD AUTO: 2 % (ref 0–6)
ERYTHROCYTE [DISTWIDTH] IN BLOOD BY AUTOMATED COUNT: 13.2 % (ref 11.6–15.1)
GFR SERPL CREATININE-BSD FRML MDRD: 81 ML/MIN/1.73SQ M
GLUCOSE P FAST SERPL-MCNC: 98 MG/DL (ref 65–99)
HCT VFR BLD AUTO: 44.1 % (ref 36.5–49.3)
HDLC SERPL-MCNC: 54 MG/DL
HGB BLD-MCNC: 14.1 G/DL (ref 12–17)
IMM GRANULOCYTES # BLD AUTO: 0.02 THOUSAND/UL (ref 0–0.2)
IMM GRANULOCYTES NFR BLD AUTO: 0 % (ref 0–2)
LDLC SERPL CALC-MCNC: 154 MG/DL (ref 0–100)
LYMPHOCYTES # BLD AUTO: 1.45 THOUSANDS/ÂΜL (ref 0.6–4.47)
LYMPHOCYTES NFR BLD AUTO: 25 % (ref 14–44)
MCH RBC QN AUTO: 31.5 PG (ref 26.8–34.3)
MCHC RBC AUTO-ENTMCNC: 32 G/DL (ref 31.4–37.4)
MCV RBC AUTO: 98 FL (ref 82–98)
MONOCYTES # BLD AUTO: 0.59 THOUSAND/ÂΜL (ref 0.17–1.22)
MONOCYTES NFR BLD AUTO: 10 % (ref 4–12)
NEUTROPHILS # BLD AUTO: 3.57 THOUSANDS/ÂΜL (ref 1.85–7.62)
NEUTS SEG NFR BLD AUTO: 62 % (ref 43–75)
NONHDLC SERPL-MCNC: 171 MG/DL
NRBC BLD AUTO-RTO: 0 /100 WBCS
PLATELET # BLD AUTO: 208 THOUSANDS/UL (ref 149–390)
PMV BLD AUTO: 10 FL (ref 8.9–12.7)
POTASSIUM SERPL-SCNC: 4.5 MMOL/L (ref 3.5–5.3)
PROT SERPL-MCNC: 7.3 G/DL (ref 6.4–8.4)
PSA SERPL-MCNC: 4.56 NG/ML (ref 0–4)
RBC # BLD AUTO: 4.48 MILLION/UL (ref 3.88–5.62)
SODIUM SERPL-SCNC: 142 MMOL/L (ref 135–147)
TRIGL SERPL-MCNC: 85 MG/DL
TSH SERPL DL<=0.05 MIU/L-ACNC: 4.5 UIU/ML (ref 0.45–4.5)
WBC # BLD AUTO: 5.76 THOUSAND/UL (ref 4.31–10.16)

## 2024-09-04 PROCEDURE — 71250 CT THORAX DX C-: CPT

## 2024-09-04 PROCEDURE — G0103 PSA SCREENING: HCPCS

## 2024-09-04 PROCEDURE — 97110 THERAPEUTIC EXERCISES: CPT

## 2024-09-04 PROCEDURE — 80061 LIPID PANEL: CPT

## 2024-09-04 PROCEDURE — 97112 NEUROMUSCULAR REEDUCATION: CPT

## 2024-09-04 PROCEDURE — 36415 COLL VENOUS BLD VENIPUNCTURE: CPT

## 2024-09-04 PROCEDURE — 85025 COMPLETE CBC W/AUTO DIFF WBC: CPT

## 2024-09-04 PROCEDURE — 80053 COMPREHEN METABOLIC PANEL: CPT

## 2024-09-04 PROCEDURE — 84443 ASSAY THYROID STIM HORMONE: CPT

## 2024-09-04 NOTE — PROGRESS NOTES
"Daily Note     Today's date: 2024  Patient name: Ankur Marc  : 1950  MRN: [id number]  Referring provider: Shaun Oswald*  Dx:   Encounter Diagnosis     ICD-10-CM    1. Other injury of muscle(s) and tendon(s) of the rotator cuff of left shoulder, initial encounter  S46.792A                      Subjective: Pt reports his L shoulder is doing well.       Objective: See treatment diary below      Assessment: Tolerated treatment well. Gradual progression with exercise. No significant limitation noted with L shoulder PROM today. Patient demonstrated fatigue post treatment, exhibited good technique with therapeutic exercises, and would benefit from continued PT, progressing as tolerated.      Plan: Continue per plan of care.      Precautions: Hx of Lung CA       Manuals         PROM Left shoulder    MJC MJC MJC MJC                                              Neuro Re-Ed             MTP LTP    RTB 5\" x20  GTB NV GTB  5\" x20 GTB  5\" x20 GTB  3x10  Blue NV       TB ER IR    RTB  X20 ea RTB  X20 ea GTB  x20 GTB  3x10       UBE for postural corrections  5/5 120 5'/5'  120 rpm 5'/5  120 rpm 5'/5'  120 rpm 5'/5'  100 rpm 5'/5'  90 rpm        0-45      2x10 2x10       Supine ABC     2x10 2x10       Supine cane    5\" x20 5\" x20 5\" x20 5\" x20                    Ther Ex             Pulley  5''10x flex/ scap   5\" x10 ea  Flex/  scap 5\" x10 ea  Flex/  scap 5\" x10 ea  Flex/  scap 5\" x10 ea  Flex/  scap       Finger ladder    5\" x10 5\" x10 5\" x10 5\" x10       Upper trap stretch   30\" x3 ea 30\" x3 ea 30\" x3 ea 30\" x3 ea       Levator stretch    30\" x3 30\" x3 30\" x3 30\" x3 ea                                                           Ther Activity                                       Gait Training                                       Modalities                                                  "

## 2024-09-06 ENCOUNTER — APPOINTMENT (OUTPATIENT)
Dept: PHYSICAL THERAPY | Facility: CLINIC | Age: 74
End: 2024-09-06
Payer: MEDICARE

## 2024-09-09 ENCOUNTER — APPOINTMENT (OUTPATIENT)
Dept: PHYSICAL THERAPY | Facility: CLINIC | Age: 74
End: 2024-09-09
Payer: MEDICARE

## 2024-09-10 ENCOUNTER — RA CDI HCC (OUTPATIENT)
Dept: OTHER | Facility: HOSPITAL | Age: 74
End: 2024-09-10

## 2024-09-10 ENCOUNTER — APPOINTMENT (OUTPATIENT)
Dept: PHYSICAL THERAPY | Facility: CLINIC | Age: 74
End: 2024-09-10
Payer: MEDICARE

## 2024-09-11 ENCOUNTER — OFFICE VISIT (OUTPATIENT)
Dept: CARDIAC SURGERY | Facility: CLINIC | Age: 74
End: 2024-09-11
Payer: MEDICARE

## 2024-09-11 ENCOUNTER — APPOINTMENT (OUTPATIENT)
Dept: PHYSICAL THERAPY | Facility: CLINIC | Age: 74
End: 2024-09-11
Payer: MEDICARE

## 2024-09-11 ENCOUNTER — DOCUMENTATION (OUTPATIENT)
Dept: CARDIAC SURGERY | Facility: CLINIC | Age: 74
End: 2024-09-11

## 2024-09-11 VITALS
WEIGHT: 175.04 LBS | BODY MASS INDEX: 25.06 KG/M2 | RESPIRATION RATE: 14 BRPM | SYSTOLIC BLOOD PRESSURE: 137 MMHG | HEIGHT: 70 IN | DIASTOLIC BLOOD PRESSURE: 81 MMHG | HEART RATE: 64 BPM | OXYGEN SATURATION: 96 % | TEMPERATURE: 98 F

## 2024-09-11 DIAGNOSIS — D69.6 PLATELETS DECREASED (HCC): Primary | ICD-10-CM

## 2024-09-11 DIAGNOSIS — C34.92 ADENOCARCINOMA, LUNG, LEFT (HCC): ICD-10-CM

## 2024-09-11 PROCEDURE — 99213 OFFICE O/P EST LOW 20 MIN: CPT | Performed by: THORACIC SURGERY (CARDIOTHORACIC VASCULAR SURGERY)

## 2024-09-11 NOTE — PROGRESS NOTES
I met with Ankur at his visit with Dr. Willard today.He's familiar with me from prior visits.Questions answered, support provided.

## 2024-09-13 ENCOUNTER — APPOINTMENT (OUTPATIENT)
Dept: PHYSICAL THERAPY | Facility: CLINIC | Age: 74
End: 2024-09-13
Payer: MEDICARE

## 2024-09-13 NOTE — PROGRESS NOTES
Ambulatory Visit  Name: Ankur Marc      : 1950      MRN: [id number]  Encounter Provider: Celestino Willard MD  Encounter Date: 2024   Encounter department: [facility]    Assessment & Plan  Platelets decreased (HCC)         Adenocarcinoma, lung, left (HCC)  74-year-old male with history of stage IIa left lower lobe adenocarcinoma status post 2023 robotic left lower lobectomy and adjuvant chemotherapy doing well with no evidence of recurrent or metastatic disease    Patient comes back to our office for 6-month surveillance CT scan.  Overall he is doing well at this time.  His most recent CT shows no concern for metastatic or recurrent disease.  From my standpoint I would recommend continued ongoing surveillance with a repeat noncontrast CT in 6 months.  He understands we will do CT scans every 6 months for the first 3 years postoperatively and then once a year after that.  I will order the scan now and we will schedule him to see us back in our office afterwards    I did discuss with him that given his stage II cancer he is at slight increased risk of lifetime recurrence.  For now we will just continue to monitor with surveillance imaging.  Should he experience any worsening shortness of breath, hemoptysis, persistent lung infections, or unintentional weight loss I would like him to call us sooner.  Otherwise follow-up with us in 6 months with a repeat scan    Celestino Willard      Orders:    CT chest wo contrast; Future      History of Present Illness     Ankur Marc is a 74 y.o. male who presents back to our office with a repeat CT scan for his history of stage II left lower lobe non-small cell lung cancer.  Overall he is doing well.  He denies any major complaints at this time.  No fevers or chills.  No cough.  No major shortness of breath.  No significant pain.      Review of Systems   Constitutional:  Negative for activity change, appetite change,  "chills, diaphoresis, fatigue, fever and unexpected weight change.   HENT: Negative.     Eyes: Negative.    Respiratory:  Negative for apnea, cough, chest tightness, shortness of breath, wheezing and stridor.    Cardiovascular:  Negative for chest pain, palpitations and leg swelling.   Gastrointestinal:  Negative for abdominal distention, abdominal pain, blood in stool, constipation, diarrhea, nausea and vomiting.   Endocrine: Negative.    Genitourinary: Negative.    Musculoskeletal: Negative.    Skin: Negative.    Allergic/Immunologic: Negative.    Neurological: Negative.    Hematological: Negative.    Psychiatric/Behavioral: Negative.             Objective     /81 (BP Location: Left arm, Patient Position: Sitting, Cuff Size: Standard)   Pulse 64   Temp 98 °F (36.7 °C) (Temporal)   Resp 14   Ht 5' 10\" (1.778 m)   Wt 79.4 kg (175 lb 0.7 oz)   SpO2 96%   BMI 25.12 kg/m²     Physical Exam  Vitals and nursing note reviewed.   Constitutional:       General: He is not in acute distress.     Appearance: He is well-developed. He is not diaphoretic.   HENT:      Head: Normocephalic and atraumatic.      Mouth/Throat:      Pharynx: No oropharyngeal exudate.   Eyes:      General: No scleral icterus.     Conjunctiva/sclera: Conjunctivae normal.      Pupils: Pupils are equal, round, and reactive to light.   Neck:      Thyroid: No thyromegaly.      Vascular: No JVD.      Trachea: No tracheal deviation.   Cardiovascular:      Rate and Rhythm: Normal rate and regular rhythm.      Heart sounds: Normal heart sounds. No murmur heard.     No friction rub. No gallop.   Pulmonary:      Effort: Pulmonary effort is normal. No respiratory distress.      Breath sounds: Normal breath sounds. No wheezing or rales.      Comments: Normal work of breathing on room air.  Chest:      Chest wall: No tenderness.   Abdominal:      General: Bowel sounds are normal. There is no distension.      Palpations: Abdomen is soft. There is no mass. "      Tenderness: There is no abdominal tenderness. There is no guarding or rebound.   Musculoskeletal:         General: No tenderness or deformity. Normal range of motion.      Cervical back: Normal range of motion and neck supple.   Skin:     General: Skin is warm and dry.      Coloration: Skin is not pale.      Findings: No erythema or rash.   Neurological:      Mental Status: He is alert and oriented to person, place, and time.   Psychiatric:         Behavior: Behavior normal.         Thought Content: Thought content normal.         Judgment: Judgment normal.     I personally reviewed his CT scan in PACS.  No significant masses.  No enlarged adenopathy.  No suspicion for recurrent or metastatic disease at this time.

## 2024-09-13 NOTE — ASSESSMENT & PLAN NOTE
74-year-old male with history of stage IIa left lower lobe adenocarcinoma status post 6/8/2023 robotic left lower lobectomy and adjuvant chemotherapy doing well with no evidence of recurrent or metastatic disease    Patient comes back to our office for 6-month surveillance CT scan.  Overall he is doing well at this time.  His most recent CT shows no concern for metastatic or recurrent disease.  From my standpoint I would recommend continued ongoing surveillance with a repeat noncontrast CT in 6 months.  He understands we will do CT scans every 6 months for the first 3 years postoperatively and then once a year after that.  I will order the scan now and we will schedule him to see us back in our office afterwards    I did discuss with him that given his stage II cancer he is at slight increased risk of lifetime recurrence.  For now we will just continue to monitor with surveillance imaging.  Should he experience any worsening shortness of breath, hemoptysis, persistent lung infections, or unintentional weight loss I would like him to call us sooner.  Otherwise follow-up with us in 6 months with a repeat scan    Celestino Willard      Orders:    CT chest wo contrast; Future

## 2024-09-16 ENCOUNTER — APPOINTMENT (OUTPATIENT)
Dept: PHYSICAL THERAPY | Facility: CLINIC | Age: 74
End: 2024-09-16
Payer: MEDICARE

## 2024-09-19 ENCOUNTER — OFFICE VISIT (OUTPATIENT)
Dept: FAMILY MEDICINE CLINIC | Facility: CLINIC | Age: 74
End: 2024-09-19
Payer: MEDICARE

## 2024-09-19 VITALS
HEIGHT: 70 IN | WEIGHT: 173.6 LBS | SYSTOLIC BLOOD PRESSURE: 116 MMHG | HEART RATE: 48 BPM | BODY MASS INDEX: 24.85 KG/M2 | OXYGEN SATURATION: 99 % | TEMPERATURE: 96.8 F | DIASTOLIC BLOOD PRESSURE: 62 MMHG

## 2024-09-19 DIAGNOSIS — E78.00 HYPERCHOLESTEROLEMIA: ICD-10-CM

## 2024-09-19 DIAGNOSIS — Z23 ENCOUNTER FOR IMMUNIZATION: ICD-10-CM

## 2024-09-19 DIAGNOSIS — E03.9 ACQUIRED HYPOTHYROIDISM: Primary | ICD-10-CM

## 2024-09-19 DIAGNOSIS — Z13.31 NEGATIVE DEPRESSION SCREENING: ICD-10-CM

## 2024-09-19 DIAGNOSIS — R97.20 ELEVATED PSA: ICD-10-CM

## 2024-09-19 DIAGNOSIS — C34.92 ADENOCARCINOMA, LUNG, LEFT (HCC): ICD-10-CM

## 2024-09-19 PROBLEM — D69.6 PLATELETS DECREASED (HCC): Status: RESOLVED | Noted: 2023-04-25 | Resolved: 2024-09-19

## 2024-09-19 PROCEDURE — 99214 OFFICE O/P EST MOD 30 MIN: CPT | Performed by: FAMILY MEDICINE

## 2024-09-19 PROCEDURE — G2211 COMPLEX E/M VISIT ADD ON: HCPCS | Performed by: FAMILY MEDICINE

## 2024-09-19 RX ORDER — ATORVASTATIN CALCIUM 20 MG/1
20 TABLET, FILM COATED ORAL DAILY
Qty: 30 TABLET | Refills: 6 | Status: SHIPPED | OUTPATIENT
Start: 2024-09-19 | End: 2024-09-23

## 2024-09-19 NOTE — PROGRESS NOTES
"Assessment/Plan:    No problem-specific Assessment & Plan notes found for this encounter.       Diagnoses and all orders for this visit:    Acquired hypothyroidism  Comments:  no change in the medication    Hypercholesterolemia  -     Lipid panel; Future  -     atorvastatin (LIPITOR) 20 mg tablet; Take 1 tablet (20 mg total) by mouth daily  -     Comprehensive metabolic panel; Future    Encounter for immunization  -     Pneumococcal Conjugate Vaccine 20-valent (Pcv20)    Adenocarcinoma, lung, left (HCC)  Comments:  per onc    Elevated PSA  -     Ambulatory Referral to Urology; Future    Negative depression screening          PHQ-2/9 Depression Screening    Little interest or pleasure in doing things: 0 - not at all  Feeling down, depressed, or hopeless: 0 - not at all  PHQ-2 Score: 0  PHQ-2 Interpretation: Negative depression screen            Subjective:      Patient ID: Ankur Marc is a 74 y.o. male.    Thyroid Problem  Presents for follow-up visit. Patient reports no anxiety, cold intolerance, depressed mood, heat intolerance, palpitations, visual change, weight gain or weight loss.       The following portions of the patient's history were reviewed and updated as appropriate: allergies, current medications, past family history, past medical history, past social history, past surgical history, and problem list.    Review of Systems   Constitutional:  Negative for chills, fever, weight gain and weight loss.   Respiratory:  Negative for shortness of breath and wheezing.    Cardiovascular:  Negative for chest pain and palpitations.   Endocrine: Negative for cold intolerance and heat intolerance.   Psychiatric/Behavioral:  The patient is not nervous/anxious.          Objective:    /62   Pulse (!) 48   Temp (!) 96.8 °F (36 °C) (Tympanic)   Ht 5' 10\" (1.778 m)   Wt 78.7 kg (173 lb 9.6 oz)   SpO2 99%   BMI 24.91 kg/m²      Physical Exam  Vitals and nursing note reviewed.   Constitutional:       General: " He is not in acute distress.     Appearance: Normal appearance. He is not ill-appearing or diaphoretic.   HENT:      Head: Normocephalic and atraumatic.   Eyes:      Conjunctiva/sclera: Conjunctivae normal.   Cardiovascular:      Rate and Rhythm: Normal rate and regular rhythm.      Heart sounds: Normal heart sounds. No murmur heard.  Pulmonary:      Effort: Pulmonary effort is normal. No respiratory distress.      Breath sounds: Normal breath sounds. No wheezing, rhonchi or rales.   Abdominal:      General: There is no distension.      Palpations: Abdomen is soft. There is no mass.      Tenderness: There is no abdominal tenderness. There is no guarding or rebound.   Musculoskeletal:      Cervical back: Normal range of motion and neck supple. No rigidity.      Right lower leg: No edema.      Left lower leg: No edema.   Lymphadenopathy:      Cervical: No cervical adenopathy.   Neurological:      Mental Status: He is alert and oriented to person, place, and time.   Psychiatric:         Mood and Affect: Mood normal.         Behavior: Behavior normal.         Thought Content: Thought content normal.         Judgment: Judgment normal.

## 2024-09-20 ENCOUNTER — APPOINTMENT (OUTPATIENT)
Dept: PHYSICAL THERAPY | Facility: CLINIC | Age: 74
End: 2024-09-20
Payer: MEDICARE

## 2024-09-21 DIAGNOSIS — E78.00 HYPERCHOLESTEROLEMIA: ICD-10-CM

## 2024-09-23 ENCOUNTER — APPOINTMENT (OUTPATIENT)
Dept: PHYSICAL THERAPY | Facility: CLINIC | Age: 74
End: 2024-09-23
Payer: MEDICARE

## 2024-09-23 RX ORDER — ATORVASTATIN CALCIUM 20 MG/1
20 TABLET, FILM COATED ORAL DAILY
Qty: 90 TABLET | Refills: 1 | Status: SHIPPED | OUTPATIENT
Start: 2024-09-23

## 2024-09-27 ENCOUNTER — APPOINTMENT (OUTPATIENT)
Dept: PHYSICAL THERAPY | Facility: CLINIC | Age: 74
End: 2024-09-27
Payer: MEDICARE

## 2024-10-03 ENCOUNTER — OFFICE VISIT (OUTPATIENT)
Dept: UROLOGY | Facility: CLINIC | Age: 74
End: 2024-10-03
Payer: MEDICARE

## 2024-10-03 VITALS
HEIGHT: 70 IN | WEIGHT: 174.4 LBS | TEMPERATURE: 98.2 F | OXYGEN SATURATION: 98 % | HEART RATE: 70 BPM | DIASTOLIC BLOOD PRESSURE: 74 MMHG | SYSTOLIC BLOOD PRESSURE: 132 MMHG | BODY MASS INDEX: 24.97 KG/M2

## 2024-10-03 DIAGNOSIS — R97.20 ELEVATED PSA: Primary | ICD-10-CM

## 2024-10-03 LAB
SL AMB  POCT GLUCOSE, UA: ABNORMAL
SL AMB LEUKOCYTE ESTERASE,UA: ABNORMAL
SL AMB POCT BILIRUBIN,UA: ABNORMAL
SL AMB POCT BLOOD,UA: ABNORMAL
SL AMB POCT CLARITY,UA: CLEAR
SL AMB POCT COLOR,UA: YELLOW
SL AMB POCT KETONES,UA: ABNORMAL
SL AMB POCT NITRITE,UA: ABNORMAL
SL AMB POCT PH,UA: 6
SL AMB POCT SPECIFIC GRAVITY,UA: 1.01
SL AMB POCT URINE PROTEIN: ABNORMAL
SL AMB POCT UROBILINOGEN: 0.2

## 2024-10-03 PROCEDURE — 81003 URINALYSIS AUTO W/O SCOPE: CPT | Performed by: UROLOGY

## 2024-10-03 PROCEDURE — 99203 OFFICE O/P NEW LOW 30 MIN: CPT | Performed by: UROLOGY

## 2024-10-03 NOTE — PROGRESS NOTES
UROLOGY PROGRESS NOTE         NAME: Ankur Marc  AGE: 74 y.o. SEX: male  : 1950   MRN: [id number]    DATE: 10/2/2024  TIME: 11:01 PM    Assessment and Plan      Impression:   Elevated PSA and family history of prostate cancer.     Plan: Patient has a mildly elevated PSA but a steady rise over the past year or 2.  His prostate exam is unremarkable he does have a family history of prostate cancer in his father who he believes passed away from the disease in his late 60s.  I repeated a PSA today.  If it remains elevated, I would recommend a prostate MRI and follow-up.  If the PSA is back to the normal range and no increased velocity, I will see him back in 6 months with a repeat PSA and exam.  He is agreeable.  He has no significant voiding symptoms and an unremarkable urinalysis today.      Chief Complaint   No chief complaint on file.    History of Present Illness     HPI: Ankur Marc is a 74 y.o. year old male who presents with history of an elevated PSA.  Most recent PSA was 4.5 last PSA was 3.3 but that was 4 years ago in .  Urinalysis was normal today.  No significant voiding symptoms mild nocturia.              The following portions of the patient's history were reviewed and updated as appropriate: allergies, current medications, past family history, past medical history, past social history, past surgical history and problem list.  Past Medical History:   Diagnosis Date    BPH with obstruction/lower urinary tract symptoms     Cancer (HCC) 2023    biopsy results    Cystitis, chronic     Epididymo-orchitis     Feeling of incomplete bladder emptying     Frequency of micturition     Nocturia     Other microscopic hematuria     Platelets decreased (HCC) 2023    Poor urinary stream     Simple renal cyst      Past Surgical History:   Procedure Laterality Date    CYSTOSCOPY      FRACTURE SURGERY      legs, arms, shoulder, back after accident    IR BIOPSY LUNG  2023    MD  [facility] INCL FLUOR GDNCE DX W/CELL WASHG SPX N/A 6/8/2023    Procedure: BRONCHOSCOPY FLEXIBLE;  Surgeon: Celestino Willard MD;  Location: [facility];  Service: Thoracic    [place] THORACOSCOPY W/LOBECTOMY SINGLE LOBE Left 6/8/2023    Procedure: Left robotic assisted lower lobectomy w/ mediastinal lymph node dissection;  Surgeon: Celestino Willard MD;  Location: [facility];  Service: Thoracic     shoulder  Review of Systems     Const: Denies chills, fever and weight loss.  CV: Denies chest pain.  Resp: Denies SOB.  GI: Denies abdominal pain, nausea and vomiting.  : Denies symptoms other than stated above.  Musculo: Denies back pain.    Objective   There were no vitals taken for this visit.    Physical Exam  Const: Appears healthy and well developed. No signs of acute distress present.  Resp: Respirations are regular and unlabored.   CV: Rate is regular. Rhythm is regular.  Abdomen: Abdomen is soft, nontender, and nondistended. Kidneys are not palpable.  : External genitalia are normal foreskin is redundant uncircumcised no lesions meatus normal testes descended and normal no hernia.  Prostate is 2-3+ smooth soft elastic and there are no nodules or hard areas no tenderness.  Psych: Patient's attitude is cooperative. Mood is normal. Affect is normal.    Procedure   Procedures     Current Medications     Current Outpatient Medications:     atorvastatin (LIPITOR) 20 mg tablet, take 1 tablet by mouth once daily, Disp: 90 tablet, Rfl: 1    b complex vitamins capsule, Take 1 capsule by mouth daily, Disp: , Rfl:     levothyroxine 75 mcg tablet, take 1 tablet by mouth once daily, Disp: 90 tablet, Rfl: 2    Multiple Vitamin (multivitamin) tablet, Take 1 tablet by mouth daily, Disp: , Rfl:         Kd Dubon MD

## 2024-10-04 ENCOUNTER — APPOINTMENT (OUTPATIENT)
Dept: LAB | Facility: HOSPITAL | Age: 74
End: 2024-10-04
Payer: MEDICARE

## 2024-10-04 DIAGNOSIS — R97.20 ELEVATED PSA: ICD-10-CM

## 2024-10-04 LAB — PSA SERPL-MCNC: 3.12 NG/ML (ref 0–4)

## 2024-10-04 PROCEDURE — 84153 ASSAY OF PSA TOTAL: CPT

## 2024-10-04 PROCEDURE — 36415 COLL VENOUS BLD VENIPUNCTURE: CPT

## 2024-10-30 ENCOUNTER — TELEPHONE (OUTPATIENT)
Dept: UROLOGY | Facility: CLINIC | Age: 74
End: 2024-10-30

## 2024-10-30 NOTE — TELEPHONE ENCOUNTER
----- Message from Jerry Dubon MD sent at 10/29/2024 10:29 PM EDT -----  PSA improved significantly.  Please repeat in 6 months

## 2024-10-30 NOTE — TELEPHONE ENCOUNTER
Left message for patient to call the office back regarding below message. Please relay message to patient when he returns call.

## 2024-10-31 NOTE — TELEPHONE ENCOUNTER
Patient called stating he is returning the call from the office.  Relayed message below   - Message from Jerry Dubon MD sent at 10/29/2024 10:29 PM EDT -----  PSA improved significantly.  Please repeat in 6 months   Patient verbalized understanding and stated thank you for the information. No further action needed at this time.

## 2024-11-15 ENCOUNTER — APPOINTMENT (OUTPATIENT)
Dept: LAB | Facility: HOSPITAL | Age: 74
End: 2024-11-15
Attending: FAMILY MEDICINE
Payer: MEDICARE

## 2024-11-15 DIAGNOSIS — E78.00 HYPERCHOLESTEROLEMIA: ICD-10-CM

## 2024-11-15 LAB
ALBUMIN SERPL BCG-MCNC: 4.2 G/DL (ref 3.5–5)
ALP SERPL-CCNC: 46 U/L (ref 34–104)
ALT SERPL W P-5'-P-CCNC: 14 U/L (ref 7–52)
ANION GAP SERPL CALCULATED.3IONS-SCNC: 4 MMOL/L (ref 4–13)
AST SERPL W P-5'-P-CCNC: 18 U/L (ref 13–39)
BILIRUB SERPL-MCNC: 0.55 MG/DL (ref 0.2–1)
BUN SERPL-MCNC: 17 MG/DL (ref 5–25)
CALCIUM SERPL-MCNC: 9 MG/DL (ref 8.4–10.2)
CHLORIDE SERPL-SCNC: 109 MMOL/L (ref 96–108)
CHOLEST SERPL-MCNC: 155 MG/DL (ref ?–200)
CO2 SERPL-SCNC: 29 MMOL/L (ref 21–32)
CREAT SERPL-MCNC: 0.99 MG/DL (ref 0.6–1.3)
GFR SERPL CREATININE-BSD FRML MDRD: 74 ML/MIN/1.73SQ M
GLUCOSE P FAST SERPL-MCNC: 106 MG/DL (ref 65–99)
HDLC SERPL-MCNC: 53 MG/DL
LDLC SERPL CALC-MCNC: 91 MG/DL (ref 0–100)
NONHDLC SERPL-MCNC: 102 MG/DL
POTASSIUM SERPL-SCNC: 4.6 MMOL/L (ref 3.5–5.3)
PROT SERPL-MCNC: 6.8 G/DL (ref 6.4–8.4)
SODIUM SERPL-SCNC: 142 MMOL/L (ref 135–147)
TRIGL SERPL-MCNC: 53 MG/DL (ref ?–150)

## 2024-11-15 PROCEDURE — 80053 COMPREHEN METABOLIC PANEL: CPT

## 2024-11-15 PROCEDURE — 80061 LIPID PANEL: CPT

## 2024-11-15 PROCEDURE — 36415 COLL VENOUS BLD VENIPUNCTURE: CPT

## 2024-11-22 ENCOUNTER — OFFICE VISIT (OUTPATIENT)
Dept: FAMILY MEDICINE CLINIC | Facility: CLINIC | Age: 74
End: 2024-11-22
Payer: MEDICARE

## 2024-11-22 VITALS
SYSTOLIC BLOOD PRESSURE: 116 MMHG | HEART RATE: 63 BPM | BODY MASS INDEX: 25.86 KG/M2 | WEIGHT: 180.6 LBS | HEIGHT: 70 IN | DIASTOLIC BLOOD PRESSURE: 80 MMHG | RESPIRATION RATE: 16 BRPM | TEMPERATURE: 97.5 F | OXYGEN SATURATION: 100 %

## 2024-11-22 DIAGNOSIS — R97.20 ELEVATED PSA: ICD-10-CM

## 2024-11-22 DIAGNOSIS — M65.342 TRIGGER RING FINGER OF LEFT HAND: ICD-10-CM

## 2024-11-22 DIAGNOSIS — E78.00 HYPERCHOLESTEROLEMIA: Primary | ICD-10-CM

## 2024-11-22 DIAGNOSIS — C34.92 ADENOCARCINOMA, LUNG, LEFT (HCC): ICD-10-CM

## 2024-11-22 DIAGNOSIS — Z23 ENCOUNTER FOR IMMUNIZATION: ICD-10-CM

## 2024-11-22 PROCEDURE — G2211 COMPLEX E/M VISIT ADD ON: HCPCS | Performed by: FAMILY MEDICINE

## 2024-11-22 PROCEDURE — 99214 OFFICE O/P EST MOD 30 MIN: CPT | Performed by: FAMILY MEDICINE

## 2024-11-22 NOTE — PROGRESS NOTES
"Assessment/Plan:    No problem-specific Assessment & Plan notes found for this encounter.       Diagnoses and all orders for this visit:    Hypercholesterolemia  Comments:  well controlled no change in the medication  Orders:  -     CBC and differential; Future  -     Comprehensive metabolic panel; Future  -     Lipid panel; Future  -     TSH, 3rd generation with Free T4 reflex; Future    Adenocarcinoma, lung, left (HCC)  Comments:  no evidence on last CT of chest    Encounter for immunization  -     Pneumococcal Conjugate Vaccine 20-valent (Pcv20)  -     influenza vaccine, high-dose, PF 0.5 mL (Fluzone High Dose)    Elevated PSA  Comments:  per urology    Trigger ring finger of left hand  -     Ambulatory Referral to Orthopedic Surgery; Future          PHQ-2/9 Depression Screening                Subjective:      Patient ID: Ankur Marc is a 74 y.o. male.    Hyperlipidemia  This is a chronic problem. The current episode started more than 1 year ago. The problem is controlled. Recent lipid tests were reviewed and are normal. Factors aggravating his hyperlipidemia include fatty foods. Pertinent negatives include no chest pain or myalgias. Current antihyperlipidemic treatment includes statins. The current treatment provides significant improvement of lipids. Compliance problems include adherence to diet and adherence to exercise.        The following portions of the patient's history were reviewed and updated as appropriate: allergies, current medications, past family history, past medical history, past social history, past surgical history, and problem list.    Review of Systems   Cardiovascular:  Negative for chest pain and palpitations.   Gastrointestinal:  Negative for abdominal pain, nausea and vomiting.   Musculoskeletal:  Negative for myalgias.         Objective:    /80 (Patient Position: Sitting, Cuff Size: Large)   Pulse 63   Temp 97.5 °F (36.4 °C) (Tympanic)   Resp 16   Ht 5' 10\" (1.778 m)   Wt " 81.9 kg (180 lb 9.6 oz)   SpO2 100%   BMI 25.91 kg/m²      Physical Exam  Vitals and nursing note reviewed.   Constitutional:       General: He is not in acute distress.     Appearance: Normal appearance. He is not ill-appearing.   HENT:      Head: Normocephalic and atraumatic.   Eyes:      Conjunctiva/sclera: Conjunctivae normal.   Cardiovascular:      Rate and Rhythm: Normal rate and regular rhythm.      Heart sounds: Normal heart sounds. No murmur heard.  Pulmonary:      Effort: Pulmonary effort is normal. No respiratory distress.      Breath sounds: Normal breath sounds. No wheezing, rhonchi or rales.   Abdominal:      General: There is no distension.      Palpations: Abdomen is soft. There is no mass.      Tenderness: There is no abdominal tenderness. There is no guarding or rebound.   Musculoskeletal:      Cervical back: Normal range of motion and neck supple. No rigidity.      Right lower leg: No edema.      Left lower leg: No edema.   Lymphadenopathy:      Cervical: No cervical adenopathy.   Skin:     General: Skin is warm and dry.   Neurological:      Mental Status: He is alert and oriented to person, place, and time.   Psychiatric:         Mood and Affect: Mood normal.         Behavior: Behavior normal.         Thought Content: Thought content normal.         Judgment: Judgment normal.

## 2024-11-22 NOTE — ADDENDUM NOTE
Addended by: PAULINA LANCASTER on: 11/22/2024 09:02 AM     Modules accepted: Orders, Level of Service

## 2024-11-25 ENCOUNTER — OFFICE VISIT (OUTPATIENT)
Dept: OBGYN CLINIC | Facility: CLINIC | Age: 74
End: 2024-11-25
Payer: MEDICARE

## 2024-11-25 VITALS
BODY MASS INDEX: 25.77 KG/M2 | SYSTOLIC BLOOD PRESSURE: 100 MMHG | HEART RATE: 73 BPM | WEIGHT: 180 LBS | DIASTOLIC BLOOD PRESSURE: 61 MMHG | HEIGHT: 70 IN

## 2024-11-25 DIAGNOSIS — M65.342 TRIGGER RING FINGER OF LEFT HAND: ICD-10-CM

## 2024-11-25 PROCEDURE — 20550 NJX 1 TENDON SHEATH/LIGAMENT: CPT | Performed by: ORTHOPAEDIC SURGERY

## 2024-11-25 PROCEDURE — 99203 OFFICE O/P NEW LOW 30 MIN: CPT | Performed by: ORTHOPAEDIC SURGERY

## 2024-11-25 RX ORDER — BUPIVACAINE HYDROCHLORIDE 2.5 MG/ML
0.5 INJECTION, SOLUTION INFILTRATION; PERINEURAL
Status: COMPLETED | OUTPATIENT
Start: 2024-11-25 | End: 2024-11-25

## 2024-11-25 RX ORDER — BETAMETHASONE SODIUM PHOSPHATE AND BETAMETHASONE ACETATE 3; 3 MG/ML; MG/ML
3 INJECTION, SUSPENSION INTRA-ARTICULAR; INTRALESIONAL; INTRAMUSCULAR; SOFT TISSUE
Status: COMPLETED | OUTPATIENT
Start: 2024-11-25 | End: 2024-11-25

## 2024-11-25 RX ADMIN — BUPIVACAINE HYDROCHLORIDE 0.5 ML: 2.5 INJECTION, SOLUTION INFILTRATION; PERINEURAL at 09:15

## 2024-11-25 RX ADMIN — BETAMETHASONE SODIUM PHOSPHATE AND BETAMETHASONE ACETATE 3 MG: 3; 3 INJECTION, SUSPENSION INTRA-ARTICULAR; INTRALESIONAL; INTRAMUSCULAR; SOFT TISSUE at 09:15

## 2024-11-25 NOTE — PROGRESS NOTES
Assessment/Plan:   Diagnoses and all orders for this visit:    Trigger ring finger of left hand  -     Ambulatory Referral to Orthopedic Surgery  -     Hand/upper extremity injection    Reviewed physical exam with the patient at time of visit. Discussed with the patient that his symptoms and exam are consistent with trigger finger of the left ring finger. He was offered and accepted an injection(s) of celestone and marcaine to his Left ring finger A1 pulley(s) for symptomatic relief of pain and inflammation. Patient tolerated the treatment(s) well. Ice and post injection protocol advised. Weightbearing activities as tolerated. He will be seen for follow-up 2 months for re-evaluation and consideration for repeat injections as necessary. Patient expresses understanding and is in agreement with this treatment plan. The patient was given the opportunity to ask questions or present concerns.    The patient has a left ring trigger finger.  Under aseptic technique, the this was injected with Celestone and Marcaine.  She tolerated procedure well.  Return back in 2 months for reevaluation      Subjective:   Patient ID: Ankur Marc  1950     HPI  Patient is a 74 y.o. male who presents for initial evaluation of his left hand. The patient reports triggering and locking in the left ring finger, for several weeks. He states that he is unable to  a hammer or screw  without reproducing symptoms. He reports pain and stiffness with making a closed fist. He has to use his opposite hand to manually release his finger from flexion. He denies numbness or tingling.     The following portions of the patient's history were reviewed and updated as appropriate:  Past medical history, past surgical history, Family history, social history, current medications and allergies    Past Medical History:   Diagnosis Date    BPH with obstruction/lower urinary tract symptoms     Cancer (HCC) 2/27/2023    lung cancer    Cystitis,  chronic     Epididymo-orchitis     Feeling of incomplete bladder emptying     Frequency of micturition     Nocturia     Other microscopic hematuria     Platelets decreased (HCC) 2023    Poor urinary stream     Simple renal cyst        Past Surgical History:   Procedure Laterality Date    CYSTOSCOPY      FRACTURE SURGERY      legs, arms, shoulder, back after accident    IR BIOPSY LUNG  2023    LUNG LOBECTOMY      left    [place] BRNCHSC INCL FLUOR GDNCE DX W/CELL WASHG SPX N/A 2023    Procedure: BRONCHOSCOPY FLEXIBLE;  Surgeon: Celestino Willard MD;  [facility];  Service: Thoracic    NV THORACOSCOPY W/LOBECTOMY SINGLE LOBE Left 2023    Procedure: Left robotic assisted lower lobectomy w/ mediastinal lymph node dissection;  Surgeon: Celestino Willard MD;  Location: [facility];  Service: Thoracic       Family History   Problem Relation Age of Onset    Diabetes Mother     Hypertension Mother     Stroke Mother     No Known Problems Father        Social History     Socioeconomic History    Marital status: /Civil Union     Spouse name: None    Number of children: None    Years of education: None    Highest education level: None   Occupational History    None   Tobacco Use    Smoking status: Former     Current packs/day: 0.00     Average packs/day: 0.3 packs/day for 60.0 years (15.0 ttl pk-yrs)     Types: Cigarettes     Start date: 3/3/1963     Quit date: 3/3/2023     Years since quittin.7    Smokeless tobacco: Never    Tobacco comments:     have quit when diagnosed with cancer   Vaping Use    Vaping status: Never Used   Substance and Sexual Activity    Alcohol use: Not Currently     Comment: No alcohol in  approx 30 years    Drug use: Never    Sexual activity: Yes     Partners: Female   Other Topics Concern    None   Social History Narrative    None     Social Drivers of Health     Financial Resource Strain: Not on file   Food Insecurity: No Food Insecurity (3/18/2024)     "Nursing - Inadequate Food Risk Classification     Worried About Running Out of Food in the Last Year: Never true     Ran Out of Food in the Last Year: Never true     Ran Out of Food in the Last Year: Not on file   Transportation Needs: No Transportation Needs (3/18/2024)    PRAPARE - Transportation     Lack of Transportation (Medical): No     Lack of Transportation (Non-Medical): No   Physical Activity: Not on file   Stress: Not on file   Social Connections: Not on file   Intimate Partner Violence: Not on file   Housing Stability: Unknown (3/18/2024)    Housing Stability Vital Sign     Unable to Pay for Housing in the Last Year: No     Number of Times Moved in the Last Year: Not on file     Homeless in the Last Year: No         Current Outpatient Medications:     atorvastatin (LIPITOR) 20 mg tablet, take 1 tablet by mouth once daily, Disp: 90 tablet, Rfl: 1    b complex vitamins capsule, Take 1 capsule by mouth daily, Disp: , Rfl:     levothyroxine 75 mcg tablet, take 1 tablet by mouth once daily, Disp: 90 tablet, Rfl: 2    Multiple Vitamin (multivitamin) tablet, Take 1 tablet by mouth daily, Disp: , Rfl:     No Known Allergies    Review of Systems   Constitutional:  Negative for chills and fever.   HENT:  Negative for ear pain and sore throat.    Eyes:  Negative for pain and visual disturbance.   Respiratory:  Negative for cough and shortness of breath.    Cardiovascular:  Negative for chest pain and palpitations.   Gastrointestinal:  Negative for abdominal pain and vomiting.   Genitourinary:  Negative for dysuria and hematuria.   Musculoskeletal:  Negative for arthralgias and back pain.   Skin:  Negative for color change and rash.   Neurological:  Negative for seizures and syncope.   All other systems reviewed and are negative.       Objective:  /61   Pulse 73   Ht 5' 10\" (1.778 m)   Wt 81.6 kg (180 lb)   BMI 25.83 kg/m²     Ortho Exam  left Hand -    Patient presents with no obvious anatomical " deformity  Skin is warm and dry to touch with no signs of erythema, ecchymosis, or infection  No soft tissue swelling or effusion noted  Full FDS, FDP, extensor mechanisms are intact  No rotational deformity with composite finger flexion  TTP with palpable nodule in the ring finger flexor tendon local to A1 Pulley  Reproducible triggering on exam  Demonstrates normal wrist, elbow, and shoulder motion  Forearm compartments are soft and supple  2+ distal radial pulse with brisk capillary refill to the fingers  Radial, median, and ulnar motor and sensory distribution intact  Sensations light to touch intact distally      Physical Exam  HENT:      Head: Normocephalic and atraumatic.      Nose: Nose normal.   Eyes:      Conjunctiva/sclera: Conjunctivae normal.   Cardiovascular:      Rate and Rhythm: Normal rate.   Pulmonary:      Effort: Pulmonary effort is normal.   Musculoskeletal:      Cervical back: Neck supple.   Skin:     General: Skin is warm and dry.      Capillary Refill: Capillary refill takes less than 2 seconds.   Neurological:      Mental Status: He is alert and oriented to person, place, and time.   Psychiatric:         Mood and Affect: Mood normal.         Behavior: Behavior normal.          Diagnostic Test Review:  No new imaging at time of visit.    Hand/upper extremity injection  Universal Protocol:  procedure performed by consultantConsent: Verbal consent obtained.  Risks and benefits: risks, benefits and alternatives were discussed  Consent given by: patient  Timeout called at: 11/25/2024 9:23 AM.  Patient understanding: patient states understanding of the procedure being performed  Patient consent: the patient's understanding of the procedure matches consent given  Site marked: the operative site was marked  Radiology Images displayed and confirmed. If images not available, report reviewed: imaging studies available  Patient identity confirmed: verbally with patient  Supporting  Documentation  Indications: joint swelling and pain   Procedure Details  Condition:trigger finger Location: ring finger -   Preparation: Patient was prepped and draped in the usual sterile fashion  Needle size: 25 G  Ultrasound guidance: no  Approach: volar  Medications administered: 0.5 mL bupivacaine 0.25 %; 3 mg betamethasone acetate-betamethasone sodium phosphate 6 (3-3) mg/mL  Patient tolerance: patient tolerated the procedure well with no immediate complications  Dressing:  Sterile dressing applied              Scribe Attestation      I,:  Federico Banegas am acting as a scribe while in the presence of the attending physician.:       I,:  Ankur Valencia DO personally performed the services described in this documentation    as scribed in my presence.:

## 2024-12-12 ENCOUNTER — APPOINTMENT (OUTPATIENT)
Dept: RADIOLOGY | Facility: CLINIC | Age: 74
End: 2024-12-12
Payer: MEDICARE

## 2024-12-12 ENCOUNTER — OFFICE VISIT (OUTPATIENT)
Dept: URGENT CARE | Facility: CLINIC | Age: 74
End: 2024-12-12
Payer: MEDICARE

## 2024-12-12 VITALS
DIASTOLIC BLOOD PRESSURE: 86 MMHG | HEART RATE: 77 BPM | OXYGEN SATURATION: 99 % | RESPIRATION RATE: 16 BRPM | SYSTOLIC BLOOD PRESSURE: 126 MMHG | TEMPERATURE: 97.9 F | WEIGHT: 180 LBS | HEIGHT: 70 IN | BODY MASS INDEX: 25.77 KG/M2

## 2024-12-12 DIAGNOSIS — R05.1 ACUTE COUGH: ICD-10-CM

## 2024-12-12 DIAGNOSIS — J06.9 VIRAL URI: Primary | ICD-10-CM

## 2024-12-12 PROCEDURE — G0463 HOSPITAL OUTPT CLINIC VISIT: HCPCS | Performed by: ORTHOPAEDIC SURGERY

## 2024-12-12 PROCEDURE — 71046 X-RAY EXAM CHEST 2 VIEWS: CPT

## 2024-12-12 PROCEDURE — 99213 OFFICE O/P EST LOW 20 MIN: CPT | Performed by: ORTHOPAEDIC SURGERY

## 2024-12-12 RX ORDER — BENZONATATE 200 MG/1
200 CAPSULE ORAL 3 TIMES DAILY PRN
Qty: 20 CAPSULE | Refills: 0 | Status: SHIPPED | OUTPATIENT
Start: 2024-12-12

## 2024-12-12 NOTE — PROGRESS NOTES
[facility]        NAME: Ankur Marc is a 74 y.o. male  : 1950    MRN: [id number]  DATE: 2024  TIME: 11:34 AM    Assessment and Plan   Viral URI [J06.9]  1. Viral URI  benzonatate (TESSALON) 200 MG capsule      2. Acute cough  XR chest pa and lateral        CXR reviewed and discussed with the patient, which shows no evidence of infiltrate or pneumonia. Elevated left diaphragm s/p lobectomy visualized, comparable to previous imaging. Will await final radiology report.     Patient afebrile, lungs clear to auscultation on exam, O2 sats 99% on RA. History and exam consistent with a viral URI. Did offer a course of prednisone, though patient declined at this time. Advised OTC symptomatic management. Discussed with patient that if he begins to experience any fevers, worsening productive cough, shortness of breath, or wheezing, he should return to the [facility], follow up with his PCP, or proceed to the ED for repeat evaluation.     Patient Instructions       Most upper respiratory infections are viral and resolve on their own within 10-14 days. Antibiotics are not indicated for the viral infection, and are only prescribed if there is evidence for a bacterial infection. Sometimes an upper respiratory infection may lead to secondary bacterial infection, such as bacterial sinusitis, in which case antibiotics would be indicated at that time. If your symptoms continue beyond 10-14 days or if you experience ongoing fevers, productive cough with green, brown, bloody phlegm production, you may have developed a bacterial infection. For the uncomplicated viral upper respiratory infection conservative management includes:    Fever and pain control:  Ibuprofen (Motrin) 600mg every 6 hours for fever, headaches, body aches   Ibuprofen is an NSAID. Please stop medication if you experience stomach/abdominal pain and report to your primary care provider.   Ask your primary care provider before you take  NSAIDs if you are on any blood thinners, or if you have a history of heart disease, kidney disease, gastric bypass surgery, GI bleed, or poorly controlled high blood pressure.   May use acetaminophen (Tylenol) as directed on the bottle between doses of ibuprofen. Do not exceed 4,000mg of Tylenol a day.   Cough & Congestion:  Guaifenesin (Mucinex) as directed on the bottle for congestion and mucous-y cough.   Dextromethorphan (Delsym, Robitussin) for dry cough and cough suppression   Pseudoephedrine (Sudafed) for congestion and sinus pressure   Sudafed may cause increased heart rate, irregular heart rate, and an increase in blood pressure. Please do not take Sudafed if you have a history of heart disease or high blood pressure.   Sudafed should not be taken if you are on anti-depressants such as those belonging to the class MAOIs or tricyclics.  Coricidin HBP (chlorpheniramine maleate) can be used as a decongestant in place of other options for those unable to take Sudafed.   Combination cough and cold such as Dimetapp and Mucinex DM also available  Sudafed PE Head Congestion +Flu Severe contains a combination of Sudafed, Tylenol, Mucinex, and Delsym  If prescribed, take Tessalon Pearles or Bromfed/Phenergan DM as directed  Avoid taking prescription cough/congestion medication and OTC options at the same time  Sore Throat:  Cepacol lozenges  Chloraseptic spray  Throat Coat tea  Warm salt water gargles   Vitamin/Minerals:  Vitamin D3 2,000 IU daily  Vitamin C 1000mg twice a day  Some studies suggest that Zinc 12.5-15mg every 2 hours while awake for 5 days may shorten symptom duration by 1-2 days  Other:   Plenty of fluids and rest  Cool mist humidifiers  Nasal sinus rinses such as NettiPot, Neimed, or Navage can be used to help flush out sinuses  Please only use distilled/sterile water that can be purchased at your local pharmacy  Nasal spray options:  Nasal steroid sprays such as Flonase, Nasonex, Nasacort may help  with sinus congestion, itchy/watery eyes, clogged ears  These options must be used consistently for at least 2 weeks for full effect  Afrin nasal spray for quick acting congestion relief  Saline nasal spray for dry nose, irritation of the nasal passages  Follow up with PCP in 3-5 days  Proceed to the ED if symptoms worsen      If tests are performed, our office will contact you with results only if changes need to made to the care plan discussed with you at the visit. You can review your full results on [facility].    Chief Complaint     Chief Complaint   Patient presents with    Cold Like Symptoms     Patient c/o head congestion that is now going into chest. Started over the weekend.         History of Present Illness       74-year-old male presents to the urgent care for evaluation of cough and congestion.  He states symptoms started this past weekend.  Saturday he did have a low-grade fever of 100.2, though has not reported any fever since.  He denies any nausea, vomiting, diarrhea.  Today his symptoms seem to have settled down into his chest, with a productive cough in the morning.  He denies any wheezing, chest pain, shortness of breath.  Denies any history of asthma, COPD, smoking, though he does have a history of lung cancer s/p left lobectomy. For symptom relief he has been using Dayquil.         Review of Systems   Review of Systems   Constitutional:  Positive for fever (Saturday, low grade). Negative for chills.   HENT:  Positive for congestion. Negative for ear pain and sore throat.    Eyes:  Negative for pain and visual disturbance.   Respiratory:  Positive for cough. Negative for chest tightness, shortness of breath and wheezing.    Cardiovascular:  Negative for chest pain and palpitations.   Gastrointestinal:  Negative for abdominal pain, diarrhea, nausea and vomiting.   Genitourinary:  Negative for dysuria and hematuria.   Musculoskeletal:  Negative for arthralgias and back pain.   Skin:   Negative for color change and rash.   Neurological:  Negative for dizziness, seizures, syncope, light-headedness and headaches.   Psychiatric/Behavioral: Negative.     All other systems reviewed and are negative.        Current Medications       Current Outpatient Medications:     atorvastatin (LIPITOR) 20 mg tablet, take 1 tablet by mouth once daily, Disp: 90 tablet, Rfl: 1    b complex vitamins capsule, Take 1 capsule by mouth daily, Disp: , Rfl:     benzonatate (TESSALON) 200 MG capsule, Take 1 capsule (200 mg total) by mouth 3 (three) times a day as needed for cough, Disp: 20 capsule, Rfl: 0    levothyroxine 75 mcg tablet, take 1 tablet by mouth once daily, Disp: 90 tablet, Rfl: 2    Multiple Vitamin (multivitamin) tablet, Take 1 tablet by mouth daily, Disp: , Rfl:     Current Allergies     Allergies as of 12/12/2024    (No Known Allergies)            The following portions of the patient's history were reviewed and updated as appropriate: allergies, current medications, past family history, past medical history, past social history, past surgical history and problem list.     Past Medical History:   Diagnosis Date    BPH with obstruction/lower urinary tract symptoms     Cancer (HCC) 2/27/2023    lung cancer    Cystitis, chronic     Epididymo-orchitis     Feeling of incomplete bladder emptying     Frequency of micturition     Nocturia     Other microscopic hematuria     Platelets decreased (HCC) 04/25/2023    Poor urinary stream     Simple renal cyst        Past Surgical History:   Procedure Laterality Date    CYSTOSCOPY  2012    FRACTURE SURGERY      legs, arms, shoulder, back after accident    IR BIOPSY LUNG  02/23/2023    LUNG LOBECTOMY  2023    left    [place] [facility] INCL FLUOR GDNCE DX W/CELL WASHG SPX N/A 06/08/2023    Procedure: BRONCHOSCOPY FLEXIBLE;  Surgeon: Celestino Willard MD;  [facility];  Service: Thoracic    [place] THORACOSCOPY W/LOBECTOMY SINGLE LOBE Left 06/08/2023    Procedure: Left robotic  "assisted lower lobectomy w/ mediastinal lymph node dissection;  Surgeon: Celestino Willard MD;  [facility];  Service: Thoracic       Family History   Problem Relation Age of Onset    Diabetes Mother     Hypertension Mother     Stroke Mother     No Known Problems Father          Medications have been verified.        Objective   /86   Pulse 77   Temp 97.9 °F (36.6 °C) (Temporal)   Resp 16   Ht 5' 10\" (1.778 m)   Wt 81.6 kg (180 lb)   SpO2 99%   BMI 25.83 kg/m²        Physical Exam     Physical Exam  Vitals and nursing note reviewed.   Constitutional:       General: He is not in acute distress.     Appearance: Normal appearance. He is not ill-appearing.   HENT:      Head: Normocephalic and atraumatic.      Right Ear: Tympanic membrane normal.      Left Ear: Tympanic membrane normal.      Nose: Nose normal.      Mouth/Throat:      Mouth: Mucous membranes are moist.      Pharynx: Oropharynx is clear. No oropharyngeal exudate or posterior oropharyngeal erythema.   Eyes:      Extraocular Movements: Extraocular movements intact.      Pupils: Pupils are equal, round, and reactive to light.   Cardiovascular:      Rate and Rhythm: Normal rate and regular rhythm.      Pulses: Normal pulses.      Heart sounds: Normal heart sounds. No murmur heard.  Pulmonary:      Effort: Pulmonary effort is normal. No respiratory distress.      Breath sounds: Normal breath sounds. No wheezing or rhonchi.   Abdominal:      Palpations: Abdomen is soft.      Tenderness: There is no abdominal tenderness.   Musculoskeletal:         General: Normal range of motion.      Cervical back: Normal range of motion.   Lymphadenopathy:      Cervical: No cervical adenopathy.   Skin:     General: Skin is warm and dry.      Capillary Refill: Capillary refill takes less than 2 seconds.   Neurological:      General: No focal deficit present.      Mental Status: He is alert and oriented to person, place, and time.   Psychiatric:         " Mood and Affect: Mood normal.         Behavior: Behavior normal.

## 2024-12-20 DIAGNOSIS — E03.9 ACQUIRED HYPOTHYROIDISM: ICD-10-CM

## 2024-12-20 RX ORDER — LEVOTHYROXINE SODIUM 75 UG/1
75 TABLET ORAL DAILY
Qty: 90 TABLET | Refills: 1 | Status: SHIPPED | OUTPATIENT
Start: 2024-12-20

## 2025-03-04 ENCOUNTER — HOSPITAL ENCOUNTER (OUTPATIENT)
Dept: CT IMAGING | Facility: HOSPITAL | Age: 75
Discharge: HOME | End: 2025-03-04
Attending: THORACIC SURGERY (CARDIOTHORACIC VASCULAR SURGERY)
Payer: MEDICARE

## 2025-03-04 DIAGNOSIS — C34.92 ADENOCARCINOMA, LUNG, LEFT (HCC): ICD-10-CM

## 2025-03-04 PROCEDURE — 71250 CT THORAX DX C-: CPT

## 2025-03-10 ENCOUNTER — OFFICE VISIT (OUTPATIENT)
Dept: CARDIAC SURGERY | Facility: CLINIC | Age: 75
End: 2025-03-10
Payer: MEDICARE

## 2025-03-10 VITALS
BODY MASS INDEX: 26.16 KG/M2 | TEMPERATURE: 97.8 F | WEIGHT: 182.32 LBS | OXYGEN SATURATION: 99 % | DIASTOLIC BLOOD PRESSURE: 78 MMHG | HEART RATE: 84 BPM | SYSTOLIC BLOOD PRESSURE: 124 MMHG

## 2025-03-10 DIAGNOSIS — C34.92 ADENOCARCINOMA, LUNG, LEFT (HCC): Primary | ICD-10-CM

## 2025-03-10 PROCEDURE — G2211 COMPLEX E/M VISIT ADD ON: HCPCS | Performed by: PHYSICIAN ASSISTANT

## 2025-03-10 PROCEDURE — 99213 OFFICE O/P EST LOW 20 MIN: CPT | Performed by: PHYSICIAN ASSISTANT

## 2025-03-10 NOTE — PROGRESS NOTES
Name: Ankur Marc      : 1950      MRN: [id number]  Encounter Provider: Thoracic Oncology [facility]  Encounter Date: 3/10/2025   Encounter department: [facility] [place]  :  Assessment & Plan  Adenocarcinoma, lung, left (HCC)  Ankur is a 74-year-old male with history of stage IIa left lower lobe adenocarcinoma status post 2023 robotic left lower lobectomy and adjuvant chemotherapy.  He denies any concerns from a thoracic standpoint.  Patient is very active with going to the gym multiple days per week.  His CT scan shows no evidence of recurrent disease. We will do CT scans every 6 months for the first three years postoperatively followed by annual scans after that. Return scan and appt scheduled.   Orders:    CT chest wo contrast; Future      Thoracic History    Cancer Staging   Adenocarcinoma, lung, left (HCC)  Staging form: Lung, AJCC 8th Edition  - Clinical stage from 2023: Stage IIA (ycT2b, cN0, cM0) - Signed by Angela Mills PA-C on 2023  Histopathologic type: Adenocarcinoma, NOS  Stage prefix: Post-therapy    Oncology History   Adenocarcinoma, lung, left (HCC)   3/6/2023 Initial Diagnosis    Adenocarcinoma, lung, left (HCC)     3/28/2023 -  Chemotherapy    palonosetron (ALOXI), 0.25 mg, Intravenous, Once, 3 of 3 cycles  Administration: 0.25 mg (3/28/2023), 0.25 mg (2023), 0.25 mg (2023)  fosaprepitant (EMEND) IVPB, 150 mg, Intravenous, Once, 3 of 3 cycles  Administration: 150 mg (3/28/2023), 150 mg (2023), 150 mg (2023)  nivolumab (OPDIVO) IVPB, 360 mg, Intravenous, Once, 2 of 2 cycles  Administration: 360 mg (3/28/2023), 360 mg (2023)  CARBOplatin (PARAPLATIN) IVPB (GOG AUC DOSING), 561.5 mg, Intravenous, Once, 3 of 3 cycles  Administration: 561.5 mg (3/28/2023), 532 mg (2023), 551 mg (2023)  PACLItaxel (TAXOL) chemo IVPB, 175 mg/m2 = 336 mg, Intravenous, Once, 3 of 3 cycles  Administration: 336 mg (3/28/2023),  336 mg (4/18/2023), 336 mg (5/9/2023)     6/8/2023 -  Cancer Staged    Staging form: Lung, AJCC 8th Edition  - Clinical stage from 6/8/2023: Stage IIA (ycT2b, cN0, cM0) - Signed by Angela Mills PA-C on 6/28/2023  Histopathologic type: Adenocarcinoma, NOS  Stage prefix: Post-therapy       6/8/2023 Surgery    Left robotic-assisted lower lobectomy with MLND          History of Present Illness     HPI Ankur Marc is a 74 y.o. male who is s/p left lower lobectomy on 6/8/2023 followed by adjuvant chemotherapy for a stage IIa left lower lobe adenocarcinoma.  He presents today for routine surveillance.  Patient denies any new medical issues since last seen.    He is doing well and denies SOB, CP, hemoptysis, productive cough, f/c, or unexplained weight loss.  He is going to the gym 3 times per week.    He is going on a cruise to the [facility] with his wife next week.     I reviewed his CT chest from 3/4/2025 which shows post surgical changes from left lower lobectomy without new or enlarging pulmonary nodules.     Review of Systems   Constitutional:  Negative for chills, diaphoresis and unexpected weight change.   HENT: Negative.     Eyes: Negative.    Respiratory:  Negative for cough, shortness of breath and wheezing.    Cardiovascular:  Negative for chest pain and leg swelling.   Gastrointestinal:  Negative for abdominal pain, diarrhea and nausea.   Skin: Negative.    Allergic/Immunologic: Negative.    All other systems reviewed and are negative.     Medical History Reviewed by provider this encounter:     .  Past Medical History   Past Medical History:   Diagnosis Date    BPH with obstruction/lower urinary tract symptoms     Cancer (HCC) 2/27/2023    lung cancer    Cystitis, chronic     Epididymo-orchitis     Feeling of incomplete bladder emptying     Frequency of micturition     Nocturia     Other microscopic hematuria     Platelets decreased (HCC) 04/25/2023    Poor urinary stream     Simple renal cyst      Past  Surgical History:   Procedure Laterality Date    CYSTOSCOPY  2012    FRACTURE SURGERY      legs, arms, shoulder, back after accident    IR BIOPSY LUNG  02/23/2023    LUNG LOBECTOMY  2023    left    [facility] N/A [facility]/08/2023    Procedure: BRONCHOSCOPY FLEXIBLE;  Surgeon: Celestino Willard MD;  Location: [facility];  Service: Thoracic    [place] THORACOSCOPY W/LOBECTOMY SINGLE LOBE Left 06/08/2023    Procedure: Left robotic assisted lower lobectomy w/ mediastinal lymph node dissection;  Surgeon: Celestino Willard MD;  Location: [facility];  Service: Thoracic     Family History   Problem Relation Age of Onset    Diabetes Mother     Hypertension Mother     Stroke Mother     No Known Problems Father       reports that he quit smoking about 2 years ago. His smoking use included cigarettes. He started smoking about 62 years ago. He has a 15 pack-year smoking history. He has never used smokeless tobacco. He reports that he does not currently use alcohol. He reports that he does not use drugs.  Current Outpatient Medications   Medication Instructions    atorvastatin (LIPITOR) 20 mg, Oral, Daily    b complex vitamins capsule 1 capsule, Oral, Daily    benzonatate (TESSALON) 200 mg, Oral, 3 times daily PRN    levothyroxine 75 mcg, Oral, Daily    Multiple Vitamin (multivitamin) tablet 1 tablet, Oral, Daily   No Known Allergies   Current Outpatient Medications on File Prior to Visit   Medication Sig Dispense Refill    atorvastatin (LIPITOR) 20 mg tablet take 1 tablet by mouth once daily 90 tablet 1    b complex vitamins capsule Take 1 capsule by mouth daily      benzonatate (TESSALON) 200 MG capsule Take 1 capsule (200 mg total) by mouth 3 (three) times a day as needed for cough 20 capsule 0    levothyroxine 75 mcg tablet take 1 tablet by mouth once daily 90 tablet 1    Multiple Vitamin (multivitamin) tablet Take 1 tablet by mouth daily       No current facility-administered medications on file  prior to visit.      Social History     Tobacco Use    Smoking status: Former     Current packs/day: 0.00     Average packs/day: 0.3 packs/day for 60.0 years (15.0 ttl pk-yrs)     Types: Cigarettes     Start date: 3/3/1963     Quit date: 3/3/2023     Years since quittin.0    Smokeless tobacco: Never    Tobacco comments:     have quit when diagnosed with cancer   Vaping Use    Vaping status: Never Used   Substance and Sexual Activity    Alcohol use: Not Currently     Comment: No alcohol in  approx 30 years    Drug use: Never    Sexual activity: Yes     Partners: Female        Objective   /78 (BP Location: Left arm, Patient Position: Sitting, Cuff Size: Standard)   Pulse 84   Temp 97.8 °F (36.6 °C) (Temporal)   Wt 82.7 kg (182 lb 5.1 oz)   SpO2 99%   BMI 26.16 kg/m²     Pain Screening:  Pain Score:   7 (hands and arms)  ECOG    Physical Exam  Vitals reviewed.   Constitutional:       General: He is not in acute distress.     Appearance: Normal appearance. He is not ill-appearing.   HENT:      Head: Normocephalic.      Nose: Nose normal.      Mouth/Throat:      Mouth: Mucous membranes are moist.   Cardiovascular:      Rate and Rhythm: Normal rate and regular rhythm.      Heart sounds: Normal heart sounds.   Pulmonary:      Effort: Pulmonary effort is normal.      Breath sounds: Normal breath sounds. No wheezing, rhonchi or rales.   Abdominal:      Palpations: Abdomen is soft.   Musculoskeletal:         General: No swelling. Normal range of motion.   Skin:     General: Skin is warm.   Neurological:      General: No focal deficit present.      Mental Status: He is alert and oriented to person, place, and time.   Psychiatric:         Mood and Affect: Mood normal.

## 2025-03-10 NOTE — ASSESSMENT & PLAN NOTE
Ankur is a 74-year-old male with history of stage IIa left lower lobe adenocarcinoma status post 6/8/2023 robotic left lower lobectomy and adjuvant chemotherapy.  He denies any concerns from a thoracic standpoint.  Patient is very active with going to the gym multiple days per week.  His CT scan shows no evidence of recurrent disease. We will do CT scans every 6 months for the first three years postoperatively followed by annual scans after that. Return scan and appt scheduled.   Orders:    CT chest wo contrast; Future

## 2025-04-15 DIAGNOSIS — E78.00 HYPERCHOLESTEROLEMIA: ICD-10-CM

## 2025-04-16 RX ORDER — ATORVASTATIN CALCIUM 20 MG/1
20 TABLET, FILM COATED ORAL DAILY
Qty: 90 TABLET | Refills: 1 | Status: SHIPPED | OUTPATIENT
Start: 2025-04-16

## 2025-04-18 ENCOUNTER — APPOINTMENT (OUTPATIENT)
Dept: LAB | Facility: HOSPITAL | Age: 75
End: 2025-04-18
Attending: FAMILY MEDICINE
Payer: MEDICARE

## 2025-04-18 DIAGNOSIS — E78.00 HYPERCHOLESTEROLEMIA: ICD-10-CM

## 2025-04-18 LAB
ALBUMIN SERPL BCG-MCNC: 4.2 G/DL (ref 3.5–5)
ALP SERPL-CCNC: 38 U/L (ref 34–104)
ALT SERPL W P-5'-P-CCNC: 14 U/L (ref 7–52)
ANION GAP SERPL CALCULATED.3IONS-SCNC: 6 MMOL/L (ref 4–13)
AST SERPL W P-5'-P-CCNC: 17 U/L (ref 13–39)
BASOPHILS # BLD AUTO: 0.03 THOUSANDS/ÂΜL (ref 0–0.1)
BASOPHILS NFR BLD AUTO: 1 % (ref 0–1)
BILIRUB SERPL-MCNC: 0.67 MG/DL (ref 0.2–1)
BUN SERPL-MCNC: 22 MG/DL (ref 5–25)
CALCIUM SERPL-MCNC: 9.5 MG/DL (ref 8.4–10.2)
CHLORIDE SERPL-SCNC: 106 MMOL/L (ref 96–108)
CHOLEST SERPL-MCNC: 146 MG/DL (ref ?–200)
CO2 SERPL-SCNC: 29 MMOL/L (ref 21–32)
CREAT SERPL-MCNC: 0.99 MG/DL (ref 0.6–1.3)
EOSINOPHIL # BLD AUTO: 0.14 THOUSAND/ÂΜL (ref 0–0.61)
EOSINOPHIL NFR BLD AUTO: 3 % (ref 0–6)
ERYTHROCYTE [DISTWIDTH] IN BLOOD BY AUTOMATED COUNT: 12.7 % (ref 11.6–15.1)
GFR SERPL CREATININE-BSD FRML MDRD: 74 ML/MIN/1.73SQ M
GLUCOSE P FAST SERPL-MCNC: 108 MG/DL (ref 65–99)
HCT VFR BLD AUTO: 44.9 % (ref 36.5–49.3)
HDLC SERPL-MCNC: 47 MG/DL
HGB BLD-MCNC: 14.2 G/DL (ref 12–17)
IMM GRANULOCYTES # BLD AUTO: 0.02 THOUSAND/UL (ref 0–0.2)
IMM GRANULOCYTES NFR BLD AUTO: 0 % (ref 0–2)
LDLC SERPL CALC-MCNC: 86 MG/DL (ref 0–100)
LYMPHOCYTES # BLD AUTO: 1.33 THOUSANDS/ÂΜL (ref 0.6–4.47)
LYMPHOCYTES NFR BLD AUTO: 28 % (ref 14–44)
MCH RBC QN AUTO: 31.5 PG (ref 26.8–34.3)
MCHC RBC AUTO-ENTMCNC: 31.6 G/DL (ref 31.4–37.4)
MCV RBC AUTO: 100 FL (ref 82–98)
MONOCYTES # BLD AUTO: 0.62 THOUSAND/ÂΜL (ref 0.17–1.22)
MONOCYTES NFR BLD AUTO: 13 % (ref 4–12)
NEUTROPHILS # BLD AUTO: 2.67 THOUSANDS/ÂΜL (ref 1.85–7.62)
NEUTS SEG NFR BLD AUTO: 55 % (ref 43–75)
NONHDLC SERPL-MCNC: 99 MG/DL
NRBC BLD AUTO-RTO: 0 /100 WBCS
PLATELET # BLD AUTO: 189 THOUSANDS/UL (ref 149–390)
PMV BLD AUTO: 9.7 FL (ref 8.9–12.7)
POTASSIUM SERPL-SCNC: 4.8 MMOL/L (ref 3.5–5.3)
PROT SERPL-MCNC: 6.9 G/DL (ref 6.4–8.4)
RBC # BLD AUTO: 4.51 MILLION/UL (ref 3.88–5.62)
SODIUM SERPL-SCNC: 141 MMOL/L (ref 135–147)
TRIGL SERPL-MCNC: 64 MG/DL (ref ?–150)
TSH SERPL DL<=0.05 MIU/L-ACNC: 2.59 UIU/ML (ref 0.45–4.5)
WBC # BLD AUTO: 4.81 THOUSAND/UL (ref 4.31–10.16)

## 2025-04-18 PROCEDURE — 36415 COLL VENOUS BLD VENIPUNCTURE: CPT

## 2025-04-18 PROCEDURE — 80061 LIPID PANEL: CPT

## 2025-04-18 PROCEDURE — 80053 COMPREHEN METABOLIC PANEL: CPT

## 2025-04-18 PROCEDURE — 85025 COMPLETE CBC W/AUTO DIFF WBC: CPT

## 2025-04-18 PROCEDURE — 84443 ASSAY THYROID STIM HORMONE: CPT

## 2025-04-25 ENCOUNTER — OFFICE VISIT (OUTPATIENT)
Dept: FAMILY MEDICINE CLINIC | Facility: CLINIC | Age: 75
End: 2025-04-25
Payer: MEDICARE

## 2025-04-25 VITALS
HEART RATE: 67 BPM | HEIGHT: 70 IN | TEMPERATURE: 96 F | DIASTOLIC BLOOD PRESSURE: 70 MMHG | OXYGEN SATURATION: 99 % | WEIGHT: 185.2 LBS | SYSTOLIC BLOOD PRESSURE: 122 MMHG | BODY MASS INDEX: 26.51 KG/M2

## 2025-04-25 DIAGNOSIS — C34.92 ADENOCARCINOMA, LUNG, LEFT (HCC): ICD-10-CM

## 2025-04-25 DIAGNOSIS — E78.00 HYPERCHOLESTEROLEMIA: ICD-10-CM

## 2025-04-25 DIAGNOSIS — Z00.00 MEDICARE ANNUAL WELLNESS VISIT, SUBSEQUENT: Primary | ICD-10-CM

## 2025-04-25 DIAGNOSIS — E03.9 ACQUIRED HYPOTHYROIDISM: ICD-10-CM

## 2025-04-25 PROCEDURE — G0439 PPPS, SUBSEQ VISIT: HCPCS

## 2025-04-25 PROCEDURE — 99214 OFFICE O/P EST MOD 30 MIN: CPT

## 2025-04-25 NOTE — PROGRESS NOTES
Name: Ankur Marc      : 1950      MRN: [id number]  Encounter Provider: MAXWELL Ahumada  Encounter Date: 2025   Encounter department: [facility]  :  Assessment & Plan  Medicare annual wellness visit, subsequent    Previous notes from PCP reviewed.  Patient offers no complaints.  Labs reviewed.         Hypercholesterolemia    Lipid panel reviewed.  At goal - continue to monitor.  Lipitor 20 mg daily as prescribed.         Acquired hypothyroidism    Continue Levothyroxine 75 mcg daily.         Adenocarcinoma, lung, left (HCC)    Follows with Cardiothoracic q6 months with imaging.  Notes reviewed.            Preventive health issues were discussed with patient, and age appropriate screening tests were ordered as noted in patient's After Visit Summary. Personalized health advice and appropriate referrals for health education or preventive services given if needed, as noted in patient's After Visit Summary.    History of Present Illness       Patient Care Team:  Shaun Oswald DO as PCP - General (Family Medicine)  MAXWELL Nichole MD as Medical Oncologist (Hematology and Oncology)  Sheila Catalan RN as Registered Nurse (Hematology and Oncology)  Celestino Willard MD as Surgeon (Thoracic Surgery)  Shaun Hartley MA as Care Coordinator (Oncology)  Dahlia Barbosa RN as Nurse Navigator (Oncology)  Jamey Cohn as  (Oncology)    Review of Systems   Constitutional:  Negative for chills, fatigue and fever.   HENT:  Negative for congestion, ear discharge, ear pain, facial swelling, rhinorrhea, sinus pressure, sinus pain, sore throat and trouble swallowing.    Eyes:  Negative for photophobia, pain and visual disturbance.   Respiratory:  Negative for cough, chest tightness, shortness of breath and wheezing.    Cardiovascular:  Negative for chest pain, palpitations and leg swelling.   Gastrointestinal:  Negative for  abdominal pain, diarrhea, nausea and vomiting.   Genitourinary:  Negative for dysuria, flank pain and hematuria.   Musculoskeletal:  Negative for arthralgias, back pain, myalgias and neck pain.   Skin:  Negative for pallor and wound.   Neurological:  Negative for dizziness, syncope, weakness, numbness and headaches.   Psychiatric/Behavioral:  Negative for confusion and sleep disturbance.    All other systems reviewed and are negative.    Medical History Reviewed by provider this encounter:       Annual Wellness Visit Questionnaire   Ankur is here for his Subsequent Wellness visit. Last Medicare Wellness visit information reviewed, patient interviewed and updates made to the record today.      Health Risk Assessment:   Patient rates overall health as good. Patient feels that their physical health rating is much better. Patient is very satisfied with their life. Eyesight was rated as same. Hearing was rated as same. Patient feels that their emotional and mental health rating is same. Patients states they are never, rarely angry. Patient states they are sometimes unusually tired/fatigued. Pain experienced in the last 7 days has been some. Patient's pain rating has been 5/10. Patient states that he has experienced no weight loss or gain in last 6 months.     Fall Risk Screening:   In the past year, patient has experienced: no history of falling in past year      Home Safety:  Patient does not have trouble with stairs inside or outside of their home. Patient has working smoke alarms and has working carbon monoxide detector. Home safety hazards include: none.     Nutrition:   Current diet is Regular.     Medications:   Patient is not currently taking any over-the-counter supplements. Patient is able to manage medications.     Activities of Daily Living (ADLs)/Instrumental Activities of Daily Living (IADLs):   Walk and transfer into and out of bed and chair?: Yes  Dress and groom yourself?: Yes    Bathe or shower  yourself?: Yes    Feed yourself? Yes  Do your laundry/housekeeping?: Yes  Manage your money, pay your bills and track your expenses?: Yes  Make your own meals?: Yes    Do your own shopping?: Yes    Previous Hospitalizations:   Any hospitalizations or ED visits within the last 12 months?: No      Advance Care Planning:   Living will: Yes    Durable POA for healthcare: Yes    Advanced directive: Yes    Advanced directive counseling given: Yes    Five wishes given: No    Patient declined ACP directive: Yes      Cognitive Screening:   Provider or family/friend/caregiver concerned regarding cognition?: No    Preventive Screenings      Cardiovascular Screening:    General: History Lipid Disorder and Screening Current      Diabetes Screening:     General: Screening Current      Colorectal Cancer Screening:     General: Screening Current      Prostate Cancer Screening:    General: Screening Current      Osteoporosis Screening:    General: Screening Not Indicated      Abdominal Aortic Aneurysm (AAA) Screening:    Risk factors include: age between 65-74 yo and tobacco use        General: Screening Current      Lung Cancer Screening:     General: Screening Not Indicated and History Lung Cancer      Hepatitis C Screening:    General: Screening Current    Immunizations:  - Immunizations due: Influenza, Prevnar 20 and Zoster (Shingrix)    Screening, Brief Intervention, and Referral to Treatment (SBIRT)     Screening  Typical number of drinks in a day: 0  Typical number of drinks in a week: 0  Interpretation: Low risk drinking behavior.    AUDIT-C Screenin) How often did you have a drink containing alcohol in the past year? never  2) How many drinks did you have on a typical day when you were drinking in the past year? 0  3) How often did you have 6 or more drinks on one occasion in the past year? never    AUDIT-C Score: 0  Interpretation: Score 0-3 (male): Negative screen for alcohol misuse    Single Item Drug  Screening:  How often have you used an illegal drug (including marijuana) or a prescription medication for non-medical reasons in the past year? never    Single Item Drug Screen Score: 0  Interpretation: Negative screen for possible drug use disorder    Brief Intervention  Alcohol & drug use screenings were reviewed. No concerns regarding substance use disorder identified.     Other Counseling Topics:   Car/seat belt/driving safety, skin self-exam, sunscreen and calcium and vitamin D intake and regular weightbearing exercise.     Social Drivers of Health     Food Insecurity: No Food Insecurity (4/23/2025)    Hunger Vital Sign     Worried About Running Out of Food in the Last Year: Never true     Ran Out of Food in the Last Year: Never true   Transportation Needs: No Transportation Needs (4/23/2025)    PRAPARE - Transportation     Lack of Transportation (Medical): No     Lack of Transportation (Non-Medical): No   Housing Stability: Unknown (4/23/2025)    Housing Stability Vital Sign     Unable to Pay for Housing in the Last Year: No     Homeless in the Last Year: No   Utilities: Not At Risk (4/23/2025)    [facility] Utilities     Threatened with loss of utilities: No     No results found.    Objective   There were no vitals taken for this visit.    Physical Exam  Vitals and nursing note reviewed.   Constitutional:       General: He is not in acute distress.     Appearance: Normal appearance. He is well-developed. He is not ill-appearing.   HENT:      Head: Normocephalic and atraumatic.      Jaw: There is normal jaw occlusion.      Right Ear: Tympanic membrane normal.      Left Ear: Tympanic membrane normal.      Nose: Nose normal.      Mouth/Throat:      Pharynx: Oropharynx is clear. No oropharyngeal exudate or posterior oropharyngeal erythema.   Eyes:      Extraocular Movements: Extraocular movements intact.      Conjunctiva/sclera: Conjunctivae normal.   Neck:      Thyroid: No thyroid mass.      Vascular: No carotid bruit  or JVD.      Trachea: Trachea and phonation normal.   Cardiovascular:      Rate and Rhythm: Normal rate and regular rhythm.      Heart sounds: S1 normal and S2 normal. No murmur heard.  Pulmonary:      Effort: Pulmonary effort is normal. No tachypnea or respiratory distress.      Breath sounds: Normal breath sounds and air entry. No stridor. No decreased breath sounds.   Chest:      Chest wall: No tenderness.   Abdominal:      General: Bowel sounds are normal. There is no distension.      Palpations: Abdomen is soft.      Tenderness: There is no abdominal tenderness. There is no right CVA tenderness or left CVA tenderness.   Musculoskeletal:         General: No swelling.      Cervical back: Normal range of motion and neck supple.      Right lower leg: No edema.      Left lower leg: No edema.   Lymphadenopathy:      Cervical: No cervical adenopathy.   Skin:     General: Skin is warm and dry.      Capillary Refill: Capillary refill takes less than 2 seconds.      Findings: No rash.   Neurological:      General: No focal deficit present.      Mental Status: He is alert and oriented to person, place, and time.      Cranial Nerves: Cranial nerves 2-12 are intact.      Sensory: Sensation is intact.      Motor: Motor function is intact.      Coordination: Coordination is intact.      Gait: Gait is intact.   Psychiatric:         Mood and Affect: Mood normal.         Behavior: Behavior is cooperative.

## 2025-04-25 NOTE — PATIENT INSTRUCTIONS
Medicare Preventive Visit Patient Instructions  Thank you for completing your Welcome to Medicare Visit or Medicare Annual Wellness Visit today. Your next wellness visit will be due in one year (4/26/2026).  The screening/preventive services that you may require over the next 5-10 years are detailed below. Some tests may not apply to you based off risk factors and/or age. Screening tests ordered at today's visit but not completed yet may show as past due. Also, please note that scanned in results may not display below.  Preventive Screenings:  Service Recommendations Previous Testing/Comments   Colorectal Cancer Screening  Colonoscopy    Fecal Occult Blood Test (FOBT)/Fecal Immunochemical Test (FIT)  Fecal DNA/Cologuard Test  Flexible Sigmoidoscopy Age: 45-75 years old   Colonoscopy: every 10 years (May be performed more frequently if at higher risk)  OR  FOBT/FIT: every 1 year  OR  Cologuard: every 3 years  OR  Sigmoidoscopy: every 5 years  Screening may be recommended earlier than age 45 if at higher risk for colorectal cancer. Also, an individualized decision between you and your healthcare provider will decide whether screening between the ages of 76-85 would be appropriate. Colonoscopy: 05/05/2023  FOBT/FIT: Not on file  Cologuard: Not on file  Sigmoidoscopy: Not on file    Screening Current     Prostate Cancer Screening Individualized decision between patient and health care provider in men between ages of 55-69   Medicare will cover every 12 months beginning on the day after your 50th birthday PSA: 3.122 ng/mL     Screening Current     Hepatitis C Screening Once for adults born between 1945 and 1965  More frequently in patients at high risk for Hepatitis C Hep C Antibody: 03/12/2024    Screening Current   Diabetes Screening 1-2 times per year if you're at risk for diabetes or have pre-diabetes Fasting glucose: 108 mg/dL (4/18/2025)  A1C: 5.6 % (1/30/2023)  Screening Current   Cholesterol Screening Once every  5 years if you don't have a lipid disorder. May order more often based on risk factors. Lipid panel: 04/18/2025  Screening Not Indicated  History Lipid Disorder      Other Preventive Screenings Covered by Medicare:  Abdominal Aortic Aneurysm (AAA) Screening: covered once if your at risk. You're considered to be at risk if you have a family history of AAA or a male between the age of 65-75 who smoking at least 100 cigarettes in your lifetime.  Lung Cancer Screening: covers low dose CT scan once per year if you meet all of the following conditions: (1) Age 55-77; (2) No signs or symptoms of lung cancer; (3) Current smoker or have quit smoking within the last 15 years; (4) You have a tobacco smoking history of at least 20 pack years (packs per day x number of years you smoked); (5) You get a written order from a healthcare provider.  Glaucoma Screening: covered annually if you're considered high risk: (1) You have diabetes OR (2) Family history of glaucoma OR (3)  aged 50 and older OR (4)  American aged 65 and older  Osteoporosis Screening: covered every 2 years if you meet one of the following conditions: (1) Have a vertebral abnormality; (2) On glucocorticoid therapy for more than 3 months; (3) Have primary hyperparathyroidism; (4) On osteoporosis medications and need to assess response to drug therapy.  HIV Screening: covered annually if you're between the age of 15-65. Also covered annually if you are younger than 15 and older than 65 with risk factors for HIV infection. For pregnant patients, it is covered up to 3 times per pregnancy.    Immunizations:  Immunization Recommendations   Influenza Vaccine Annual influenza vaccination during flu season is recommended for all persons aged >= 6 months who do not have contraindications   Pneumococcal Vaccine   * Pneumococcal conjugate vaccine = PCV13 (Prevnar 13), PCV15 (Vaxneuvance), PCV20 (Prevnar 20)  * Pneumococcal polysaccharide vaccine =  PPSV23 (Pneumovax) Adults 19-65 yo with certain risk factors or if 65+ yo  If never received any pneumonia vaccine: recommend Prevnar 20 (PCV20)  Give PCV20 if previously received 1 dose of PCV13 or PPSV23   Hepatitis B Vaccine 3 dose series if at intermediate or high risk (ex: diabetes, end stage renal disease, liver disease)   Respiratory syncytial virus (RSV) Vaccine - COVERED BY MEDICARE PART D  * RSVPreF3 (Arexvy) CDC recommends that adults 60 years of age and older may receive a single dose of RSV vaccine using shared clinical decision-making (SCDM)   Tetanus (Td) Vaccine - COST NOT COVERED BY MEDICARE PART B Following completion of primary series, a booster dose should be given every 10 years to maintain immunity against tetanus. Td may also be given as tetanus wound prophylaxis.   Tdap Vaccine - COST NOT COVERED BY MEDICARE PART B Recommended at least once for all adults. For pregnant patients, recommended with each pregnancy.   Shingles Vaccine (Shingrix) - COST NOT COVERED BY MEDICARE PART B  2 shot series recommended in those 19 years and older who have or will have weakened immune systems or those 50 years and older     Health Maintenance Due:      Topic Date Due   • Colorectal Cancer Screening  05/05/2033   • Hepatitis C Screening  Completed   • Lung Cancer Screening  Discontinued     Immunizations Due:      Topic Date Due   • Pneumococcal Vaccine: 65+ Years (2 of 2 - PPSV23) 10/25/2019   • Influenza Vaccine (1) 09/01/2024   • COVID-19 Vaccine (4 - 2024-25 season) 09/01/2024     Advance Directives   What are advance directives?  Advance directives are legal documents that state your wishes and plans for medical care. These plans are made ahead of time in case you lose your ability to make decisions for yourself. Advance directives can apply to any medical decision, such as the treatments you want, and if you want to donate organs.   What are the types of advance directives?  There are many types of  advance directives, and each state has rules about how to use them. You may choose a combination of any of the following:  Living will:  This is a written record of the treatment you want. You can also choose which treatments you do not want, which to limit, and which to stop at a certain time. This includes surgery, medicine, IV fluid, and tube feedings.   Durable power of  for healthcare (DPAHC):  This is a written record that states who you want to make healthcare choices for you when you are unable to make them for yourself. This person, called a proxy, is usually a family member or a friend. You may choose more than 1 proxy.  Do not resuscitate (DNR) order:  A DNR order is used in case your heart stops beating or you stop breathing. It is a request not to have certain forms of treatment, such as CPR. A DNR order may be included in other types of advance directives.  Medical directive:  This covers the care that you want if you are in a coma, near death, or unable to make decisions for yourself. You can list the treatments you want for each condition. Treatment may include pain medicine, surgery, blood transfusions, dialysis, IV or tube feedings, and a ventilator (breathing machine).  Values history:  This document has questions about your views, beliefs, and how you feel and think about life. This information can help others choose the care that you would choose.  Why are advance directives important?  An advance directive helps you control your care. Although spoken wishes may be used, it is better to have your wishes written down. Spoken wishes can be misunderstood, or not followed. Treatments may be given even if you do not want them. An advance directive may make it easier for your family to make difficult choices about your care.   Weight Management   Why it is important to manage your weight:  Being overweight increases your risk of health conditions such as heart disease, high blood pressure,  type 2 diabetes, and certain types of cancer. It can also increase your risk for osteoarthritis, sleep apnea, and other respiratory problems. Aim for a slow, steady weight loss. Even a small amount of weight loss can lower your risk of health problems.  How to lose weight safely:  A safe and healthy way to lose weight is to eat fewer calories and get regular exercise. You can lose up about 1 pound a week by decreasing the number of calories you eat by 500 calories each day.   Healthy meal plan for weight management:  A healthy meal plan includes a variety of foods, contains fewer calories, and helps you stay healthy. A healthy meal plan includes the following:  Eat whole-grain foods more often.  A healthy meal plan should contain fiber. Fiber is the part of grains, fruits, and vegetables that is not broken down by your body. Whole-grain foods are healthy and provide extra fiber in your diet. Some examples of whole-grain foods are whole-wheat breads and pastas, oatmeal, brown rice, and bulgur.  Eat a variety of vegetables every day.  Include dark, leafy greens such as spinach, kale, mick greens, and mustard greens. Eat yellow and orange vegetables such as carrots, sweet potatoes, and winter squash.   Eat a variety of fruits every day.  Choose fresh or canned fruit (canned in its own juice or light syrup) instead of juice. Fruit juice has very little or no fiber.  Eat low-fat dairy foods.  Drink fat-free (skim) milk or 1% milk. Eat fat-free yogurt and low-fat cottage cheese. Try low-fat cheeses such as mozzarella and other reduced-fat cheeses.  Choose meat and other protein foods that are low in fat.  Choose beans or other legumes such as split peas or lentils. Choose fish, skinless poultry (chicken or turkey), or lean cuts of red meat (beef or pork). Before you cook meat or poultry, cut off any visible fat.   Use less fat and oil.  Try baking foods instead of frying them. Add less fat, such as margarine, sour  cream, regular salad dressing and mayonnaise to foods. Eat fewer high-fat foods. Some examples of high-fat foods include french fries, doughnuts, ice cream, and cakes.  Eat fewer sweets.  Limit foods and drinks that are high in sugar. This includes candy, cookies, regular soda, and sweetened drinks.  Exercise:  Exercise at least 30 minutes per day on most days of the week. Some examples of exercise include walking, biking, dancing, and swimming. You can also fit in more physical activity by taking the stairs instead of the elevator or parking farther away from stores. Ask your healthcare provider about the best exercise plan for you.      © Copyright [employer] 2018 Information is for End User's use only and may not be sold, redistributed or otherwise used for commercial purposes. All illustrations and images included in CareNotes® are the copyrighted property of [employer] Inc[employer] or [employer]

## 2025-04-25 NOTE — ASSESSMENT & PLAN NOTE
Lipid panel reviewed.  At goal - continue to monitor.  Lipitor 20 mg daily as prescribed.

## 2025-05-12 VITALS — HEIGHT: 70 IN | WEIGHT: 185 LBS | BODY MASS INDEX: 26.48 KG/M2

## 2025-05-12 DIAGNOSIS — M65.331 TRIGGER MIDDLE FINGER OF RIGHT HAND: Primary | ICD-10-CM

## 2025-05-12 PROCEDURE — 99213 OFFICE O/P EST LOW 20 MIN: CPT | Performed by: ORTHOPAEDIC SURGERY

## 2025-05-12 PROCEDURE — 20550 NJX 1 TENDON SHEATH/LIGAMENT: CPT | Performed by: ORTHOPAEDIC SURGERY

## 2025-05-12 RX ORDER — BETAMETHASONE SODIUM PHOSPHATE AND BETAMETHASONE ACETATE 3; 3 MG/ML; MG/ML
3 INJECTION, SUSPENSION INTRA-ARTICULAR; INTRALESIONAL; INTRAMUSCULAR; SOFT TISSUE
Status: COMPLETED | OUTPATIENT
Start: 2025-05-12 | End: 2025-05-12

## 2025-05-12 RX ORDER — BUPIVACAINE HYDROCHLORIDE 2.5 MG/ML
0.5 INJECTION, SOLUTION INFILTRATION; PERINEURAL
Status: COMPLETED | OUTPATIENT
Start: 2025-05-12 | End: 2025-05-12

## 2025-05-12 RX ADMIN — BETAMETHASONE SODIUM PHOSPHATE AND BETAMETHASONE ACETATE 3 MG: 3; 3 INJECTION, SUSPENSION INTRA-ARTICULAR; INTRALESIONAL; INTRAMUSCULAR; SOFT TISSUE at 14:30

## 2025-05-12 RX ADMIN — BUPIVACAINE HYDROCHLORIDE 0.5 ML: 2.5 INJECTION, SOLUTION INFILTRATION; PERINEURAL at 14:30

## 2025-05-12 NOTE — PROGRESS NOTES
Assessment & Plan  Trigger middle finger of right hand  The patient was seen and examined.  He has a trigger finger of his right middle finger.  Possible treatment options were discussed with the patient.  The A1 pulley was injected with Celestone and Marcaine.  He tolerated the injection quite well.  He will follow-up with our office in 2 months.  He is acceptable to this plan.             The patient has a right middle trigger finger.  The A1 pulley was injected with Celestone and Marcaine.  He tolerated procedure quite well.  Return back in 2 months for reevaluation    Return in about 2 months (around 7/12/2025).      _____________________________________________________  CHIEF COMPLAINT:  Chief Complaint   Patient presents with    Right Middle Finger - Pain, Clicking         SUBJECTIVE:  Ankur Marc is a 74 y.o. male who presents to our office complaining of locking and triggering along his right middle finger.  The patient had a history of a trigger finger along his left ring finger which is now asymptomatic due to a previous corticosteroid injection.  He denies any numbness or tingling.  Denies any fever or chills.    The following portions of the patient's history were reviewed and updated as appropriate: allergies, current medications, past family history, past medical history, past social history, past surgical history and problem list.    PAST MEDICAL HISTORY:  Past Medical History:   Diagnosis Date    BPH with obstruction/lower urinary tract symptoms     Cancer ([facility]) 2/27/2023    lung cancer    Cystitis, chronic     Epididymo-orchitis     Feeling of incomplete bladder emptying     Frequency of micturition     Nocturia     Other microscopic hematuria     Platelets decreased ([facility]) 04/25/2023    Poor urinary stream     Simple renal cyst        PAST SURGICAL HISTORY:  Past Surgical History:   Procedure Laterality Date    CYSTOSCOPY  2012    FRACTURE SURGERY      legs, arms, shoulder, back after accident     IR BIOPSY LUNG  2023    LUNG LOBECTOMY      left    CA NCC INCL FLUOR GDNCE DX W/CELL WASHG SPX N/A 2023    Procedure: BRONCHOSCOPY FLEXIBLE;  Surgeon: Celestino Willard MD;  Location: [facility];  Service: Thoracic    CA THORACOSCOPY W/LOBECTOMY SINGLE LOBE Left 2023    Procedure: Left robotic assisted lower lobectomy w/ mediastinal lymph node dissection;  Surgeon: Celestino Willard MD;  Location: [facility];  Service: Thoracic       FAMILY HISTORY:  Family History   Problem Relation Age of Onset    Diabetes Mother     Hypertension Mother     Stroke Mother     No Known Problems Father        SOCIAL HISTORY:  Social History     Tobacco Use    Smoking status: Former     Current packs/day: 0.00     Average packs/day: 0.3 packs/day for 60.0 years (15.0 ttl pk-yrs)     Types: Cigarettes     Start date: 3/3/1963     Quit date: 3/3/2023     Years since quittin.1    Smokeless tobacco: Never    Tobacco comments:     have quit when diagnosed with cancer   Vaping Use    Vaping status: Never Used   Substance Use Topics    Alcohol use: Not Currently     Comment: No alcohol in  approx 30 years    Drug use: Never       MEDICATIONS:    Current Outpatient Medications:     atorvastatin (LIPITOR) 20 mg tablet, take 1 tablet by mouth once daily, Disp: 90 tablet, Rfl: 1    b complex vitamins capsule, Take 1 capsule by mouth daily, Disp: , Rfl:     levothyroxine 75 mcg tablet, take 1 tablet by mouth once daily, Disp: 90 tablet, Rfl: 1    Multiple Vitamin (multivitamin) tablet, Take 1 tablet by mouth daily, Disp: , Rfl:     ALLERGIES:  No Known Allergies    ROS:  Review of Systems     Constitutional: Negative for fatigue, fever or loss of appetite.   HENT: Negative.    Respiratory: Negative for shortness of breath, dyspnea.    Cardiovascular: Negative for chest pain/tightness.   Gastrointestinal: Negative for abdominal pain, N/V.   Endocrine: Negative for cold/heat intolerance, unexplained weight  "loss/gain.   Genitourinary: Negative for flank pain, dysuria, hematuria.   Musculoskeletal: Positive for arthralgia   Skin: Negative for rash.    Neurological: Negative for numbness or tingling  Psychiatric/Behavioral: Negative for agitation.  _____________________________________________________  PHYSICAL EXAMINATION:    Height 5' 10\" (1.778 m), weight 83.9 kg (185 lb).    Constitutional: Oriented to person, place, and time. Appears well-developed and well-nourished. No distress.   HENT:   Head: Normocephalic.   Eyes: Conjunctivae are normal. Right eye exhibits no discharge. Left eye exhibits no discharge. No scleral icterus.   Cardiovascular: Normal rate.    Pulmonary/Chest: Effort normal.   Neurological: Alert and oriented to person, place, and time.   Skin: Skin is warm and dry. No rash noted. Not diaphoretic. No erythema. No pallor.   Psychiatric: Normal mood and affect. Behavior is normal. Judgment and thought content normal.      MUSCULOSKELETAL EXAMINATION:   Physical Exam  Ortho Exam    Right upper extremity is neurovascularly intact  Fingers are pink and mobile  Compartments are soft  Locking and triggering along the right middle finger  There is a hypertrophic A1 pulley  Brisk cap refill  Sensation intact  Objective:  BP Readings from Last 1 Encounters:   04/25/25 122/70      Wt Readings from Last 1 Encounters:   05/12/25 83.9 kg (185 lb)        BMI:   Estimated body mass index is 26.54 kg/m² as calculated from the following:    Height as of this encounter: 5' 10\" (1.778 m).    Weight as of this encounter: 83.9 kg (185 lb).      PROCEDURES PERFORMED:  Hand/upper extremity injection: R long A1    Universal Protocol:  Risks and benefits: risks, benefits and alternatives were discussed  Consent given by: patient  Patient understanding: patient states understanding of the procedure being performed  Site marked: the operative site was marked  Supporting Documentation  Indications: therapeutic   Procedure " Details  Condition:trigger finger Location: long finger - R long A1   Preparation: Patient was prepped and draped in the usual sterile fashion  Needle size: 25 G  Ultrasound guidance: no  Medications administered: 0.5 mL bupivacaine 0.25 %; 3 mg betamethasone acetate-betamethasone sodium phosphate 6 (3-3) mg/mL  Patient tolerance: patient tolerated the procedure well with no immediate complications  Dressing:  Sterile dressing applied             Scribe Attestation      I,:  Tevin Aldana PA-C am acting as a scribe while in the presence of the attending physician.:       I,:  Ankur Valencia DO personally performed the services described in this documentation    as scribed in my presence.:

## 2025-06-15 DIAGNOSIS — E03.9 ACQUIRED HYPOTHYROIDISM: ICD-10-CM

## 2025-06-15 RX ORDER — LEVOTHYROXINE SODIUM 75 UG/1
75 TABLET ORAL DAILY
Qty: 90 TABLET | Refills: 1 | Status: SHIPPED | OUTPATIENT
Start: 2025-06-15

## 2025-06-27 ENCOUNTER — TELEPHONE (OUTPATIENT)
Age: 75
End: 2025-06-27

## 2025-06-27 NOTE — TELEPHONE ENCOUNTER
Pt under care of:  Dr. Dubon  Office Location: [place]    Insurance   Current Insurance?yes   Insurance E-verified? yes      History  Last Seen: 10/3/2024    Recommended: Return in about 6 months (around 4/3/2025)    Pt calling due to: Late schedule 6 month follow up    Active Symptoms? no Explain: n/a    Appointment Details     Date: 8/1/2025   Time:  10 am  Location:  [place]  Provider:  Dr. Dubon    Does the appointment need further review? n/a      Pt can be reached at: [phone]

## 2025-08-01 ENCOUNTER — APPOINTMENT (OUTPATIENT)
Dept: LAB | Facility: HOSPITAL | Age: 75
End: 2025-08-01
Attending: UROLOGY
Payer: MEDICARE

## 2025-08-01 ENCOUNTER — OFFICE VISIT (OUTPATIENT)
Dept: UROLOGY | Facility: CLINIC | Age: 75
End: 2025-08-01
Payer: MEDICARE

## 2025-08-01 VITALS
BODY MASS INDEX: 26.17 KG/M2 | HEIGHT: 70 IN | OXYGEN SATURATION: 98 % | HEART RATE: 58 BPM | WEIGHT: 182.8 LBS | SYSTOLIC BLOOD PRESSURE: 122 MMHG | TEMPERATURE: 97.9 F | DIASTOLIC BLOOD PRESSURE: 74 MMHG

## 2025-08-01 DIAGNOSIS — N13.8 BPH WITH OBSTRUCTION/LOWER URINARY TRACT SYMPTOMS: Primary | ICD-10-CM

## 2025-08-01 DIAGNOSIS — N40.1 BPH WITH OBSTRUCTION/LOWER URINARY TRACT SYMPTOMS: Primary | ICD-10-CM

## 2025-08-01 DIAGNOSIS — R97.20 ELEVATED PSA: ICD-10-CM

## 2025-08-01 LAB
SL AMB  POCT GLUCOSE, UA: NORMAL
SL AMB LEUKOCYTE ESTERASE,UA: NORMAL
SL AMB POCT BILIRUBIN,UA: NORMAL
SL AMB POCT BLOOD,UA: NORMAL
SL AMB POCT CLARITY,UA: CLEAR
SL AMB POCT COLOR,UA: YELLOW
SL AMB POCT KETONES,UA: NORMAL
SL AMB POCT NITRITE,UA: NORMAL
SL AMB POCT PH,UA: 5.5
SL AMB POCT SPECIFIC GRAVITY,UA: 1.01
SL AMB POCT URINE PROTEIN: NORMAL
SL AMB POCT UROBILINOGEN: 0.2

## 2025-08-01 PROCEDURE — 84153 ASSAY OF PSA TOTAL: CPT

## 2025-08-01 PROCEDURE — 36415 COLL VENOUS BLD VENIPUNCTURE: CPT

## 2025-08-01 PROCEDURE — 81003 URINALYSIS AUTO W/O SCOPE: CPT | Performed by: UROLOGY

## 2025-08-01 PROCEDURE — 99213 OFFICE O/P EST LOW 20 MIN: CPT | Performed by: UROLOGY

## 2025-08-02 LAB — PSA SERPL-MCNC: 1.72 NG/ML (ref 0–4)

## (undated) DEVICE — FIRST STEP BEDSIDE KIT - STAND-UP POUCH, ENDOSCOPIC CLEANING PAD - 1 POUCH: Brand: FIRST STEP BEDSIDE KIT - STAND-UP POUCH, ENDOSCOPIC CLEANING PAD

## (undated) DEVICE — SUREFORM 45 RELOAD WHITE: Brand: SUREFORM

## (undated) DEVICE — VISUALIZATION SYSTEM: Brand: CLEARIFY

## (undated) DEVICE — NEEDLE SPINAL 20G X 3.5 LF

## (undated) DEVICE — STAPLER SHEATH: Brand: ENDOWRIST

## (undated) DEVICE — SINGLE USE SUCTION VALVE MAJ-209: Brand: SINGLE USE SUCTION VALVE (STERILE)

## (undated) DEVICE — SUT VICRYL 3-0 SH 27 IN J416H

## (undated) DEVICE — UTILITY MARKER,BLACK WITH LABELS: Brand: DEVON

## (undated) DEVICE — CANNULA SEAL

## (undated) DEVICE — TIP-UP FENESTRATED GRASPER: Brand: ENDOWRIST

## (undated) DEVICE — INTENDED FOR TISSUE SEPARATION, AND OTHER PROCEDURES THAT REQUIRE A SHARP SURGICAL BLADE TO PUNCTURE OR CUT.: Brand: BARD-PARKER SAFETY BLADES SIZE 15, STERILE

## (undated) DEVICE — SINGLE USE BIOPSY VALVE MAJ-210: Brand: SINGLE USE BIOPSY VALVE (STERILE)

## (undated) DEVICE — GLOVE SRG BIOGEL ECLIPSE 7.5

## (undated) DEVICE — SYRINGE 10ML SLIP TIP LF

## (undated) DEVICE — CADIERE FORCEPS: Brand: ENDOWRIST

## (undated) DEVICE — STAPLER 30 RELOAD GRAY: Brand: ENDOWRIST

## (undated) DEVICE — GAUZE SPONGES,16 PLY: Brand: CURITY

## (undated) DEVICE — NEEDLE HYPO 22G X 1-1/2 IN

## (undated) DEVICE — TELFA NON-ADHERENT ABSORBENT DRESSING: Brand: TELFA

## (undated) DEVICE — ANTIBACTERIAL UNDYED BRAIDED (POLYGLACTIN 910), SYNTHETIC ABSORBABLE SUTURE: Brand: COATED VICRYL

## (undated) DEVICE — SUREFORM 45: Brand: SUREFORM

## (undated) DEVICE — SEAL

## (undated) DEVICE — HEAVY DUTY TABLE COVER: Brand: CONVERTORS

## (undated) DEVICE — Device: Brand: TISSUE RETRIEVAL SYSTEM

## (undated) DEVICE — GLOVE INDICATOR PI UNDERGLOVE SZ 8 BLUE

## (undated) DEVICE — REDUCER: Brand: ENDOWRIST

## (undated) DEVICE — ELECTRO LUBE IS A SINGLE PATIENT USE DEVICE THAT IS INTENDED TO BE USED ON ELECTROSURGICAL ELECTRODES TO REDUCE STICKING.: Brand: KEY SURGICAL ELECTRO LUBE

## (undated) DEVICE — SURGICEL 4 X 8

## (undated) DEVICE — VESSEL LOOPS X-RAY DETECTABLE: Brand: DEROYAL

## (undated) DEVICE — COLUMN DRAPE

## (undated) DEVICE — GAUZE ROLL KITTNER

## (undated) DEVICE — ARM DRAPE

## (undated) DEVICE — DRAIN SPONGES,6 PLY: Brand: EXCILON

## (undated) DEVICE — SUREFORM 45 RELOAD GREEN: Brand: SUREFORM

## (undated) DEVICE — MARYLAND BIPOLAR FORCEPS: Brand: ENDOWRIST

## (undated) DEVICE — TRAY FOLEY 16FR URIMETER SILICONE SURESTEP

## (undated) DEVICE — TROCARS: Brand: KII® OPTICAL ACCESS SYSTEM

## (undated) DEVICE — STAPLER 30 RELOAD: Brand: ENDOWRIST

## (undated) DEVICE — SUT MONOCRYL PLUS 4-0 PS-2 18 IN MCP496G

## (undated) DEVICE — ADHESIVE SKIN HIGH VISCOSITY EXOFIN 1ML

## (undated) DEVICE — KIT, BETHLEHEM THORACIC ROBOT: Brand: CARDINAL HEALTH

## (undated) DEVICE — SUT PROLENE 0 CT-1 30 IN 8424H